# Patient Record
Sex: FEMALE | Race: WHITE | Employment: OTHER | URBAN - METROPOLITAN AREA
[De-identification: names, ages, dates, MRNs, and addresses within clinical notes are randomized per-mention and may not be internally consistent; named-entity substitution may affect disease eponyms.]

---

## 2017-02-07 ENCOUNTER — MYC REFILL (OUTPATIENT)
Dept: FAMILY MEDICINE | Facility: OTHER | Age: 72
End: 2017-02-07

## 2017-02-07 DIAGNOSIS — E87.6 HYPOKALEMIA: ICD-10-CM

## 2017-02-07 RX ORDER — POTASSIUM CHLORIDE 1500 MG/1
20 TABLET, EXTENDED RELEASE ORAL 2 TIMES DAILY
Qty: 180 TABLET | Refills: 1 | Status: CANCELLED | OUTPATIENT
Start: 2017-02-07

## 2017-02-07 NOTE — TELEPHONE ENCOUNTER
Potassium chloide      Last Written Prescription Date: 03/18/15  Last Fill Quantity: 180, # refills: 1  Last Office Visit with G, P or [facility] prescribing provider: 12/18/15       POTASSIUM   Date Value Ref Range Status   08/09/2016 4.0 3.4 - 5.3 mmol/L Final     CREATININE   Date Value Ref Range Status   08/09/2016 0.82 0.52 - 1.04 mg/dL Final     BP Readings from Last 3 Encounters:   08/09/16 121/75   01/21/16 142/84   01/15/16 126/76

## 2017-02-07 NOTE — TELEPHONE ENCOUNTER
Message from [employer]:  Original authorizing provider: Monique Ambriz MD, MD    Romina Chavarria would like a refill of the following medications:  potassium chloride SA (POTASSIUM CHLORIDE) 20 MEQ tablet [Monique Ambriz MD, MD]    Preferred pharmacy: [facility] - NEW RX ONLY    Comment:

## 2017-02-08 ENCOUNTER — MYC MEDICAL ADVICE (OUTPATIENT)
Dept: GASTROENTEROLOGY | Facility: CLINIC | Age: 72
End: 2017-02-08

## 2017-02-08 DIAGNOSIS — E87.6 HYPOKALEMIA: Primary | ICD-10-CM

## 2017-02-09 RX ORDER — POTASSIUM CHLORIDE 1500 MG/1
20 TABLET, EXTENDED RELEASE ORAL 2 TIMES DAILY
Qty: 180 TABLET | Refills: 1 | Status: SHIPPED | OUTPATIENT
Start: 2017-02-09 | End: 2018-06-12

## 2017-02-09 NOTE — TELEPHONE ENCOUNTER
I have not seen since 2014.  This was sent for hepatology per the subject line.  Monique Ambriz MD

## 2017-04-27 ENCOUNTER — TELEPHONE (OUTPATIENT)
Dept: FAMILY MEDICINE | Facility: OTHER | Age: 72
End: 2017-04-27

## 2017-04-27 NOTE — TELEPHONE ENCOUNTER
"4/27/2017      Call not required completed all HM screening    Canh \"Shirin\" Santosh  Central Scheduler    "

## 2017-05-15 ENCOUNTER — TELEPHONE (OUTPATIENT)
Dept: GASTROENTEROLOGY | Facility: CLINIC | Age: 72
End: 2017-05-15

## 2017-05-15 NOTE — TELEPHONE ENCOUNTER
Left message for pt reminding them of upcoming appointment.  Instructed pt to bring updated medications list.  Instructed pt to arrive an hour and a half to two hours prior to appt time for labs.  Misbah Knutson, CMA

## 2017-05-23 ENCOUNTER — OFFICE VISIT (OUTPATIENT)
Dept: GASTROENTEROLOGY | Facility: CLINIC | Age: 72
End: 2017-05-23
Attending: INTERNAL MEDICINE
Payer: MEDICARE

## 2017-05-23 VITALS
BODY MASS INDEX: 37.16 KG/M2 | OXYGEN SATURATION: 97 % | SYSTOLIC BLOOD PRESSURE: 147 MMHG | TEMPERATURE: 97.8 F | HEIGHT: 58 IN | DIASTOLIC BLOOD PRESSURE: 85 MMHG | HEART RATE: 66 BPM | WEIGHT: 177 LBS

## 2017-05-23 DIAGNOSIS — K70.30 ALCOHOLIC CIRRHOSIS OF LIVER WITHOUT ASCITES (H): ICD-10-CM

## 2017-05-23 DIAGNOSIS — K70.30 ALCOHOLIC CIRRHOSIS OF LIVER WITHOUT ASCITES (H): Primary | ICD-10-CM

## 2017-05-23 LAB
AFP SERPL-MCNC: 2.1 UG/L (ref 0–8)
ALBUMIN SERPL-MCNC: 3.4 G/DL (ref 3.4–5)
ALP SERPL-CCNC: 68 U/L (ref 40–150)
ALT SERPL W P-5'-P-CCNC: 37 U/L (ref 0–50)
ANION GAP SERPL CALCULATED.3IONS-SCNC: 8 MMOL/L (ref 3–14)
AST SERPL W P-5'-P-CCNC: 31 U/L (ref 0–45)
BILIRUB DIRECT SERPL-MCNC: 0.2 MG/DL (ref 0–0.2)
BILIRUB SERPL-MCNC: 0.6 MG/DL (ref 0.2–1.3)
BUN SERPL-MCNC: 15 MG/DL (ref 7–30)
CALCIUM SERPL-MCNC: 9.3 MG/DL (ref 8.5–10.1)
CHLORIDE SERPL-SCNC: 106 MMOL/L (ref 94–109)
CO2 SERPL-SCNC: 26 MMOL/L (ref 20–32)
CREAT SERPL-MCNC: 0.77 MG/DL (ref 0.52–1.04)
ERYTHROCYTE [DISTWIDTH] IN BLOOD BY AUTOMATED COUNT: 13.6 % (ref 10–15)
GFR SERPL CREATININE-BSD FRML MDRD: 74 ML/MIN/1.7M2
GLUCOSE SERPL-MCNC: 133 MG/DL (ref 70–99)
HCT VFR BLD AUTO: 44.2 % (ref 35–47)
HGB BLD-MCNC: 15.2 G/DL (ref 11.7–15.7)
INR PPP: 1.17 (ref 0.86–1.14)
MCH RBC QN AUTO: 34.1 PG (ref 26.5–33)
MCHC RBC AUTO-ENTMCNC: 34.4 G/DL (ref 31.5–36.5)
MCV RBC AUTO: 99 FL (ref 78–100)
PLATELET # BLD AUTO: 175 10E9/L (ref 150–450)
POTASSIUM SERPL-SCNC: 3.9 MMOL/L (ref 3.4–5.3)
PROT SERPL-MCNC: 7.2 G/DL (ref 6.8–8.8)
RBC # BLD AUTO: 4.46 10E12/L (ref 3.8–5.2)
SODIUM SERPL-SCNC: 141 MMOL/L (ref 133–144)
WBC # BLD AUTO: 4.9 10E9/L (ref 4–11)

## 2017-05-23 PROCEDURE — 99212 OFFICE O/P EST SF 10 MIN: CPT | Mod: ZF

## 2017-05-23 PROCEDURE — 82105 ALPHA-FETOPROTEIN SERUM: CPT | Performed by: INTERNAL MEDICINE

## 2017-05-23 PROCEDURE — 80076 HEPATIC FUNCTION PANEL: CPT | Performed by: INTERNAL MEDICINE

## 2017-05-23 PROCEDURE — 36415 COLL VENOUS BLD VENIPUNCTURE: CPT | Performed by: INTERNAL MEDICINE

## 2017-05-23 PROCEDURE — 80048 BASIC METABOLIC PNL TOTAL CA: CPT | Performed by: INTERNAL MEDICINE

## 2017-05-23 PROCEDURE — 85027 COMPLETE CBC AUTOMATED: CPT | Performed by: INTERNAL MEDICINE

## 2017-05-23 PROCEDURE — 85610 PROTHROMBIN TIME: CPT | Performed by: INTERNAL MEDICINE

## 2017-05-23 ASSESSMENT — PAIN SCALES - GENERAL: PAINLEVEL: NO PAIN (0)

## 2017-05-23 NOTE — PROGRESS NOTES
HISTORY OF PRESENT ILLNESS:  I had the pleasure of seeing Romina Chavarria for followup in the Liver Clinic at the [facility] on 05/23/2017.  Ms. 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  She is a sustained virologic responder to hepatitis C treatment.        She is actually doing quite well.  She denies any abdominal pain, itching or skin rash.  She does complain of fatigue.  She denies any increased abdominal girth or lower extremity edema.  She denies any gastrointestinal bleeding or any overt signs of encephalopathy.  She denies any fevers or chills, cough or shortness of breath.  She denies any nausea, vomiting, diarrhea or constipation.  Her appetite has been good and her weight, unfortunately, has remained stable.  It would be nice to see her lose some weight. There have been no other new events since she was last seen.       Current Outpatient Prescriptions   Medication     potassium chloride SA (POTASSIUM CHLORIDE) 20 MEQ CR tablet     furosemide (LASIX) 40 MG tablet     spironolactone (ALDACTONE) 50 MG tablet     ferrous gluconate (FERGON) 324 (38 FE) MG tablet     sertraline (ZOLOFT) 100 MG tablet     traZODone (DESYREL) 50 MG tablet     UNABLE TO FIND     triamcinolone (KENALOG) 0.1 % cream     Omega-3 Fatty Acids (FISH OIL TRIPLE STRENGTH) 1400 MG CAPS     Multiple Vitamin (MULTI-DAY) TABS     GREEN COFFEE BEAN PO     loratadine (CLARITIN) 10 MG tablet     No current facility-administered medications for this visit.      B/P: 147/85, T: 97.8, P: 66, R: Data Unavailable    HEENT exam shows no scleral icterus and no temporal muscle wasting.  Chest is clear.  Abdominal exam shows no increase in girth.  No masses or tenderness to palpation are present.  Her liver is 10 cm in span without left lobe enlargement.  No spleen tip is palpable.  Extremity exam shows no edema.  Skin exam shows no stigmata of chronic liver disease.  Neurologic exam shows  no asterixis.       Recent Results (from the past 168 hour(s))    Hepatic Panel [LAB20]    Collection Time: 05/23/17 10:11 AM   Result Value Ref Range    Bilirubin Direct 0.2 0.0 - 0.2 mg/dL    Bilirubin Total 0.6 0.2 - 1.3 mg/dL    Albumin 3.4 3.4 - 5.0 g/dL    Protein Total 7.2 6.8 - 8.8 g/dL    Alkaline Phosphatase 68 40 - 150 U/L    ALT 37 0 - 50 U/L    AST 31 0 - 45 U/L   Basic metabolic panel [LAB15]    Collection Time: 05/23/17 10:11 AM   Result Value Ref Range    Sodium 141 133 - 144 mmol/L    Potassium 3.9 3.4 - 5.3 mmol/L    Chloride 106 94 - 109 mmol/L    Carbon Dioxide 26 20 - 32 mmol/L    Anion Gap 8 3 - 14 mmol/L    Glucose 133 (H) 70 - 99 mg/dL    Urea Nitrogen 15 7 - 30 mg/dL    Creatinine 0.77 0.52 - 1.04 mg/dL    GFR Estimate 74 >60 mL/min/1.7m2    GFR Estimate If Black 90 >60 mL/min/1.7m2    Calcium 9.3 8.5 - 10.1 mg/dL   CBC with platelets [PMN990]    Collection Time: 05/23/17 10:11 AM   Result Value Ref Range    WBC 4.9 4.0 - 11.0 10e9/L    RBC Count 4.46 3.8 - 5.2 10e12/L    Hemoglobin 15.2 11.7 - 15.7 g/dL    Hematocrit 44.2 35.0 - 47.0 %    MCV 99 78 - 100 fl    MCH 34.1 (H) 26.5 - 33.0 pg    MCHC 34.4 31.5 - 36.5 g/dL    RDW 13.6 10.0 - 15.0 %    Platelet Count 175 150 - 450 10e9/L   INR [GRB7457]    Collection Time: 05/23/17 10:11 AM   Result Value Ref Range    INR 1.17 (H) 0.86 - 1.14      She is due for an ultrasound.  Her last ultrasound was in December and showed no ascites and no mass lesions.      My impression is that Ms. 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  Her disease is extremely well compensated at this point in time.  I will not be making any change to her medical regimen as all complications are well addressed.  I will get an ultrasound now and she will return in 6 months for repeat blood work and ultrasound.  She will be due for a DEXA in 6 months as well as upper GI endoscopy to screen for esophageal varices.  She is otherwise up-to-date with other  cancer screening and vaccines.      Thank you very much for allowing me to participate in the care of this patient.  If you have any questions regarding my recommendations, please do not hesitate to contact me.       José Antonio Reese MD      Professor of Medicine  [employer]      Executive Medical Director, Solid Organ Transplant Program  [facility]

## 2017-05-23 NOTE — NURSING NOTE
Chief Complaint   Patient presents with     RECHECK     Alcoholic Cirrhosis without ascites   Pt roomed, vitals, meds, and allergies reviewed with pt. Pt ready for provider.  Misbah Knutson, CMA

## 2017-05-23 NOTE — MR AVS SNAPSHOT
After Visit Summary   5/23/2017    Romina Chavarria    MRN: [id number]           Patient Information     Date Of Birth          1945        Visit Information        Provider Department      5/23/2017 11:00 AM José Antonio Reese MD [facility] Hepatology        Today's Diagnoses     Alcoholic cirrhosis of liver without ascites (H)    -  1       Follow-ups after your visit        Follow-up notes from your care team     Return in about 6 months (around 11/23/2017).      Your next 10 appointments already scheduled     Nov 07, 2017 11:15 AM CST   (Arrive by 11:00 AM)   Return General Liver with José Antonio Reese MD   [facility]at[facility] ([facility] [place])    [address] [facility] [address]  [address] [facility] [address] [phone]              Future tests that were ordered for you today     Open Standing Orders        Priority Remaining Interval Expires Ordered    US abdomen complete [OEK333] Routine 2/2 Every 6 Months 6/22/2018 5/23/2017          Open Future Orders        Priority Expected Expires Ordered     Hepatic Panel [LAB20] Routine 11/19/2017 6/22/2018 5/23/2017    Basic metabolic panel [LAB15] Routine 11/19/2017 6/22/2018 5/23/2017    CBC with platelets [[id number]] Routine 11/19/2017 6/22/2018 5/23/2017    INR [[id number]] Routine 11/19/2017 6/22/2018 5/23/2017    AFP tumor marker [ERC830] Routine 11/19/2017 6/22/2018 5/23/2017            Who to contact     If you have questions or need follow up information about today's clinic visit or your schedule please contact [facility]PAT[facility] directly at [phone].  Normal or non-critical lab and imaging results will be communicated to you by MyChart, letter or phone within 4 business days after the clinic has received the results. If you do not hear from us within 7 days, please contact the clinic through MyChart or phone. If you have a critical or abnormal lab result, we will notify you by phone as soon as possible.  Submit refill requests through  "MyChart or call your pharmacy and they will forward the refill request to us. Please allow 3 business days for your refill to be completed.          Additional Information About Your Visit        MyChart Information     [employer] gives you secure access to your electronic health record. If you see a primary care provider, you can also send messages to your care team and make appointments. If you have questions, please call your primary care clinic.  If you do not have a primary care provider, please call [phone] and they will assist you.        Care EveryWhere ID     This is your Care EveryWhere ID. This could be used by other organizations to access your [facility] medical records  [id number]        Your Vitals Were     Pulse Temperature Height Pulse Oximetry BMI (Body Mass Index)       66 97.8  F (36.6  C) (Oral) 1.473 m (4' 10\") 97% 36.99 kg/m2        Blood Pressure from Last 3 Encounters:   05/23/17 147/85   08/09/16 121/75   01/21/16 142/84    Weight from Last 3 Encounters:   05/23/17 80.3 kg (177 lb)   08/09/16 84.8 kg (187 lb)   01/21/16 80.6 kg (177 lb 12.8 oz)              We Performed the Following     Schedule follow up appointments     US Abd Cmpl Abd/Pelvis Duplex Cmpl        Primary Care Provider Office Phone # Fax #    Monique Kriss Ambriz [phone]       [facility]  [address] [address]        Thank you!     Thank you for choosing [facility]  for your care. Our goal is always to provide you with excellent care. Hearing back from our patients is one way we can continue to improve our services. Please take a few minutes to complete the written survey that you may receive in the mail after your visit with us. Thank you!             Your Updated Medication List - Protect others around you: Learn how to safely use, store and throw away your medicines at www.disposemymeds.org.          This list is accurate as of: 5/23/17 11:53 AM.  Always use your " most recent med list.                   Brand Name Dispense Instructions for use    ferrous gluconate 324 (38 FE) MG tablet    FERGON    100 tablet    Take 1 tablet (324 mg) by mouth 3 times daily (with meals)       FISH OIL TRIPLE STRENGTH 1400 MG Caps      Take 1 capsule by mouth once daily. Indications: CARDIAC DISEASE       furosemide 40 MG tablet    LASIX    180 tablet    TAKE 1 TABLET TWICE DAILY       GREEN COFFEE BEAN PO      Take  by mouth.       loratadine 10 MG tablet    CLARITIN     Take 1 tablet by mouth daily.       MULTI-DAY Tabs      Take 1 tablet by mouth once daily. Indications: VITAMIN DEFICIENCY PREVENTION       potassium chloride SA 20 MEQ CR tablet    potassium chloride    180 tablet    Take 1 tablet (20 mEq) by mouth 2 times daily       sertraline 100 MG tablet    ZOLOFT    90 tablet    Take 1 tablet (100 mg) by mouth daily       spironolactone 50 MG tablet    ALDACTONE    180 tablet    TAKE 1 TABLET TWICE DAILY       traZODone 50 MG tablet    DESYREL    90 tablet    Take 1 tablet (50 mg) by mouth At Bedtime       triamcinolone 0.1 % cream    KENALOG    60 g    Apply topically 2 times daily       UNABLE TO FIND      MEDICATION NAME: Liver Clense - [employer] Milk Thistle, Artichoke, Tumeric

## 2017-05-26 ENCOUNTER — RADIANT APPOINTMENT (OUTPATIENT)
Dept: ULTRASOUND IMAGING | Facility: OTHER | Age: 72
End: 2017-05-26
Attending: INTERNAL MEDICINE
Payer: COMMERCIAL

## 2017-05-26 DIAGNOSIS — K70.30 ALCOHOLIC CIRRHOSIS OF LIVER WITHOUT ASCITES (H): ICD-10-CM

## 2017-05-26 PROCEDURE — 76700 US EXAM ABDOM COMPLETE: CPT | Mod: 59

## 2017-05-26 PROCEDURE — 93975 VASCULAR STUDY: CPT

## 2017-08-14 ENCOUNTER — MYC REFILL (OUTPATIENT)
Dept: GASTROENTEROLOGY | Facility: CLINIC | Age: 72
End: 2017-08-14

## 2017-08-14 DIAGNOSIS — F41.9 ANXIETY: ICD-10-CM

## 2017-08-14 RX ORDER — SERTRALINE HYDROCHLORIDE 100 MG/1
100 TABLET, FILM COATED ORAL DAILY
Qty: 90 TABLET | Refills: 3 | Status: SHIPPED | OUTPATIENT
Start: 2017-08-14 | End: 2018-12-07

## 2017-08-14 RX ORDER — TRAZODONE HYDROCHLORIDE 50 MG/1
50 TABLET, FILM COATED ORAL AT BEDTIME
Qty: 90 TABLET | Refills: 3 | Status: SHIPPED | OUTPATIENT
Start: 2017-08-14 | End: 2018-12-07

## 2017-08-14 NOTE — TELEPHONE ENCOUNTER
Message from MyChart:  Original authorizing provider: José Antonio Reese MD    Romina Chavarria would like a refill of the following medications:  sertraline (ZOLOFT) 100 MG tablet [José Antonio Reese MD]  traZODone (DESYREL) 50 MG tablet [José Antonio Reese MD]    Preferred pharmacy: [facility] - [facility] [address]    Comment:

## 2017-10-24 ENCOUNTER — HOSPITAL ENCOUNTER (OUTPATIENT)
Dept: MAMMOGRAPHY | Facility: CLINIC | Age: 72
End: 2017-10-24
Attending: INTERNAL MEDICINE
Payer: MEDICARE

## 2017-10-24 ENCOUNTER — HOSPITAL ENCOUNTER (OUTPATIENT)
Dept: ULTRASOUND IMAGING | Facility: CLINIC | Age: 72
Discharge: HOME | End: 2017-10-24
Attending: INTERNAL MEDICINE | Admitting: INTERNAL MEDICINE
Payer: MEDICARE

## 2017-10-24 ENCOUNTER — HOSPITAL ENCOUNTER (OUTPATIENT)
Dept: ULTRASOUND IMAGING | Facility: CLINIC | Age: 72
End: 2017-10-24
Attending: INTERNAL MEDICINE
Payer: MEDICARE

## 2017-10-24 DIAGNOSIS — K70.30 ALCOHOLIC CIRRHOSIS OF LIVER WITHOUT ASCITES (H): ICD-10-CM

## 2017-10-24 DIAGNOSIS — N60.01 BREAST CYST, RIGHT: ICD-10-CM

## 2017-10-24 LAB
AFP SERPL-MCNC: 3.8 UG/L (ref 0–8)
ALBUMIN SERPL-MCNC: 3.6 G/DL (ref 3.4–5)
ALP SERPL-CCNC: 66 U/L (ref 40–150)
ALT SERPL W P-5'-P-CCNC: 33 U/L (ref 0–50)
ANION GAP SERPL CALCULATED.3IONS-SCNC: 6 MMOL/L (ref 3–14)
AST SERPL W P-5'-P-CCNC: 32 U/L (ref 0–45)
BILIRUB DIRECT SERPL-MCNC: 0.3 MG/DL (ref 0–0.2)
BILIRUB SERPL-MCNC: 0.7 MG/DL (ref 0.2–1.3)
BUN SERPL-MCNC: 12 MG/DL (ref 7–30)
CALCIUM SERPL-MCNC: 9.1 MG/DL (ref 8.5–10.1)
CHLORIDE SERPL-SCNC: 109 MMOL/L (ref 94–109)
CO2 SERPL-SCNC: 28 MMOL/L (ref 20–32)
CREAT SERPL-MCNC: 0.75 MG/DL (ref 0.52–1.04)
ERYTHROCYTE [DISTWIDTH] IN BLOOD BY AUTOMATED COUNT: 13.4 % (ref 10–15)
GFR SERPL CREATININE-BSD FRML MDRD: 76 ML/MIN/1.7M2
GLUCOSE SERPL-MCNC: 133 MG/DL (ref 70–99)
HCT VFR BLD AUTO: 44.2 % (ref 35–47)
HGB BLD-MCNC: 14.4 G/DL (ref 11.7–15.7)
INR PPP: 1.01 (ref 0.86–1.14)
MCH RBC QN AUTO: 33 PG (ref 26.5–33)
MCHC RBC AUTO-ENTMCNC: 32.6 G/DL (ref 31.5–36.5)
MCV RBC AUTO: 101 FL (ref 78–100)
PLATELET # BLD AUTO: 151 10E9/L (ref 150–450)
POTASSIUM SERPL-SCNC: 4 MMOL/L (ref 3.4–5.3)
PROT SERPL-MCNC: 6.9 G/DL (ref 6.8–8.8)
RBC # BLD AUTO: 4.36 10E12/L (ref 3.8–5.2)
SODIUM SERPL-SCNC: 143 MMOL/L (ref 133–144)
WBC # BLD AUTO: 4 10E9/L (ref 4–11)

## 2017-10-24 PROCEDURE — G0279 TOMOSYNTHESIS, MAMMO: HCPCS

## 2017-10-24 PROCEDURE — 76642 ULTRASOUND BREAST LIMITED: CPT | Mod: RT

## 2017-10-24 PROCEDURE — 80076 HEPATIC FUNCTION PANEL: CPT | Performed by: INTERNAL MEDICINE

## 2017-10-24 PROCEDURE — 76700 US EXAM ABDOM COMPLETE: CPT

## 2017-10-24 PROCEDURE — 36415 COLL VENOUS BLD VENIPUNCTURE: CPT | Performed by: INTERNAL MEDICINE

## 2017-10-24 PROCEDURE — 85610 PROTHROMBIN TIME: CPT | Performed by: INTERNAL MEDICINE

## 2017-10-24 PROCEDURE — 80048 BASIC METABOLIC PNL TOTAL CA: CPT | Performed by: INTERNAL MEDICINE

## 2017-10-24 PROCEDURE — 85027 COMPLETE CBC AUTOMATED: CPT | Performed by: INTERNAL MEDICINE

## 2017-10-24 PROCEDURE — 82105 ALPHA-FETOPROTEIN SERUM: CPT | Performed by: INTERNAL MEDICINE

## 2017-10-25 ENCOUNTER — TELEPHONE (OUTPATIENT)
Dept: FAMILY MEDICINE | Facility: OTHER | Age: 72
End: 2017-10-25

## 2017-10-25 NOTE — TELEPHONE ENCOUNTER
Spoke with patient. Was outside on Monday and was working in the yard.  Pulled on a root and it broke off and she fell.  Yesterday was busy with a few appointments and seemed ok,  But now today with deep breathing is worse.  Seems to be getting worse. Hurt when turning over in bed last night. Better with just resting. Did schedule her for an appointment for tomorrow.  Will try OTC pain reducer tonight and rest.  If symptoms worsen needs to be seen sooner.    RECOMMENDED DISPOSITION:  See in 24 hours -   Will comply with recommendation: yes   If further questions/concerns or if Sx do not improve, worsen or new Sx develop, call your PCP or [facility] as soon as possible.    NOTES:  Disposition was determined by the first positive assessment question, therefore all previous assessment questions were negative.     Guideline used:fall  Telephone Triage Protocols for Nurses, Fifth Edition, Lizzy Thomas RN, BSN

## 2017-10-25 NOTE — PROGRESS NOTES
SUBJECTIVE:                                                    Romina Chavarria is a 71 year old female who presents to clinic today for the following health issues:      HPI    Joint Pain    Onset: x 4 days    Description:   Location: left shoulder and left breast region  Character: Sharp- when breathing    Intensity: moderate, 5/10    Progression of Symptoms: worse    Accompanying Signs & Symptoms:  Other symptoms: none    History:   Previous similar pain: no       Precipitating factors:   Trauma or overuse: YES- Fell on Monday    Alleviating factors:  Improved by: Ibuprofen    Therapies Tried and outcome: Ibuprofen    Patient was pulling roots in her yard and it broke and she fell onto her side. Did not hit head. Having pain along the left shoulder into left side of the breast. The pain hurts mostly when she takes a deep breath, usually not hurting when sitting still. No fevers or chills. No numbness or tingling. No bruising,  Skin intact. She had a fall in her shower a couple months ago onto the same shoulder.     Problem list and histories reviewed & adjusted, as indicated.  Additional history: as documented      Current Outpatient Prescriptions   Medication Sig Dispense Refill     sertraline (ZOLOFT) 100 MG tablet Take 1 tablet (100 mg) by mouth daily 90 tablet 3     traZODone (DESYREL) 50 MG tablet Take 1 tablet (50 mg) by mouth At Bedtime 90 tablet 3     potassium chloride SA (POTASSIUM CHLORIDE) 20 MEQ CR tablet Take 1 tablet (20 mEq) by mouth 2 times daily 180 tablet 1     furosemide (LASIX) 40 MG tablet TAKE 1 TABLET TWICE DAILY 180 tablet 3     spironolactone (ALDACTONE) 50 MG tablet TAKE 1 TABLET TWICE DAILY 180 tablet 3     ferrous gluconate (FERGON) 324 (38 FE) MG tablet Take 1 tablet (324 mg) by mouth 3 times daily (with meals) 100 tablet 11     UNABLE TO FIND MEDICATION NAME: Liver [place] - [employer]  Milk Thistle, Artichoke, Tumeric       triamcinolone (KENALOG)  "0.1 % cream Apply topically 2 times daily 60 g 3     Multiple Vitamin (MULTI-DAY) TABS Take 1 tablet by mouth once daily. Indications: VITAMIN DEFICIENCY PREVENTION       loratadine (CLARITIN) 10 MG tablet Take 1 tablet by mouth daily.       Omega-3 Fatty Acids (FISH OIL TRIPLE STRENGTH) 1400 MG CAPS Take 1 capsule by mouth once daily. Indications: CARDIAC DISEASE       GREEN COFFEE BEAN PO Take  by mouth.         ROS:  Review of Systems   Constitutional: Negative.    HENT: Negative.    Eyes: Negative.    Respiratory: Negative.    Cardiovascular: Positive for chest pain (breast pain). Negative for palpitations and leg swelling.   Gastrointestinal: Negative.    Endocrine: Negative.    Genitourinary: Negative.    Musculoskeletal: Positive for joint swelling.        Left shoulder discomfort.    Skin: Negative.    Allergic/Immunologic: Negative.    Neurological: Negative.    Hematological: Negative.    Psychiatric/Behavioral: Negative.          OBJECTIVE:     /78 (BP Location: Left arm, Patient Position: Chair, Cuff Size: Adult Regular)  Pulse 68  Temp 98.3  F (36.8  C) (Oral)  Resp 16  Ht 4' 10\" (1.473 m)  SpO2 98%  There is no height or weight on file to calculate BMI.   Physical Exam   Constitutional: She is oriented to person, place, and time. Vital signs are normal.   Musculoskeletal:        Left shoulder: She exhibits decreased range of motion and pain (induced from pain along ribcage area. ). She exhibits no tenderness, no bony tenderness, no swelling, no effusion, no deformity, no laceration, normal pulse and normal strength.        Arms:  Pain along the let underarm. No tenderness along the left shoulder joint. Decrease ROM due to underarm pain and left rib pain.    Neurological: She is alert and oriented to person, place, and time. She has normal strength. No cranial nerve deficit or sensory deficit.       Diagnostic Test Results:  Xray - Chest xray 2 view and Rib 2 view.   EKG - " negative    ASSESSMENT/PLAN:         1. Rib pain on left side  - Await results of xray, likely contusion or mild fracture of the rib area.   - Discussed pain medication and monitoring.   - Advised to avoid high amounts of ibuprofen, ok to take sparingly as this will help with inflammation.   - Discussed worsening symptoms and when to go in to be evaluated.     2. History of recent fall  - Discussed physical therapy and home assessment given history of falls, patient declined.   - XR Chest 2 Views; Future      3. At risk for falling  - Discussed above, patient declined for now.       4. Alcohol dependence in remission (H)  -Stable, continues to be in remission.     5. Need for prophylactic vaccination and inoculation against influenza    - FLU VACCINE, INCREASED ANTIGEN, PRESV FREE, AGE 65+ [27471]  - Vaccine Administration, Initial [70087]    Patient Instructions   - Await results of of xray, likely contusion or mild fracture of the rib area. This can take awhile to heal. Recommend ice 3-4 times daily, ibuprofen sparingly along with TYLENOL 1000 mg every 6 hours; maximum daily dose: 3000 mg daily.   -Recommend holding a pillow with deep breathing and coughing  - If you develop shortness of breath, increased pain or worsening symptoms please return to be evaluated.   - If you change your mind and would like to do some physical therapy and home safety assessment please let me know.     MELE Howard CNP        Rib Contusion or Minor Fracture    A rib contusion is a bruise to one or more rib bones. It may cause pain, tenderness, swelling, and a purplish tint to the skin. There may be a sharp pain with each breath. A rib contusion takes anywhere from a few days to a few weeks to heal. A minor rib fracture or break may cause the same symptoms as a rib contusion. The small crack may not be seen on a regular chest X-ray. Treatment for both problems is the same.  Home care    You may use over-the-counter pain  medicine to control pain, unless another pain medicine was prescribed. If you have chronic liver or kidney disease or ever had a stomach ulcer or GI bleeding, talk with your healthcare provider before using these medicines.    Rest. Do not lift anything heavy or do any activity that causes pain.    Apply an ice pack over the injured area for 15 to 20 minutes every 1 to 2 hours. You should do this for the first 24 to 48 hours. You can make an ice pack by filling a plastic bag that seals at the top with ice cubes and then wrapping it with a thin towel. Continue with ice packs as needed for the relief of pain and swelling.    The first 3 to 4 weeks of healing will be the most painful. If your pain is not under control with the treatment given, call your healthcare provider. Sometimes a stronger pain medicine may be needed. A nerve block can be done in case of severe pain. It will numb the nerve between the ribs.  Follow-up care  Follow up with your healthcare provider, or as advised.  If X-rays were taken, you will be told of any new findings that may affect your care.  Call 911  Call 911 if you have:    Dizziness, weakness or fainting    Shortness of breath with or without chest discomfort    New or worsening pain  When to seek medical advice  Call your healthcare provider right away if any of these occur:    Fever of 100.4 F (38 C) or above lasting for 24 to 48 hours    Stomach pain  Date Last Reviewed: 12/2/2015 2000-2017 [employer]. [address] [place] [address], [place] [address]. All rights reserved. This information is not intended as a substitute for professional medical care. Always follow your healthcare professional's instructions.            MELE Howard [facility]

## 2017-10-26 ENCOUNTER — OFFICE VISIT (OUTPATIENT)
Dept: FAMILY MEDICINE | Facility: OTHER | Age: 72
End: 2017-10-26
Payer: COMMERCIAL

## 2017-10-26 ENCOUNTER — RADIANT APPOINTMENT (OUTPATIENT)
Dept: GENERAL RADIOLOGY | Facility: OTHER | Age: 72
End: 2017-10-26
Attending: NURSE PRACTITIONER
Payer: COMMERCIAL

## 2017-10-26 VITALS
HEART RATE: 68 BPM | RESPIRATION RATE: 16 BRPM | SYSTOLIC BLOOD PRESSURE: 126 MMHG | OXYGEN SATURATION: 98 % | HEIGHT: 58 IN | DIASTOLIC BLOOD PRESSURE: 78 MMHG | TEMPERATURE: 98.3 F

## 2017-10-26 DIAGNOSIS — Z91.81 AT RISK FOR FALLING: ICD-10-CM

## 2017-10-26 DIAGNOSIS — F10.21 ALCOHOL DEPENDENCE IN REMISSION (H): ICD-10-CM

## 2017-10-26 DIAGNOSIS — Z91.81 HISTORY OF RECENT FALL: ICD-10-CM

## 2017-10-26 DIAGNOSIS — R07.81 RIB PAIN ON LEFT SIDE: ICD-10-CM

## 2017-10-26 DIAGNOSIS — Z23 NEED FOR PROPHYLACTIC VACCINATION AND INOCULATION AGAINST INFLUENZA: ICD-10-CM

## 2017-10-26 DIAGNOSIS — R07.81 RIB PAIN ON LEFT SIDE: Primary | ICD-10-CM

## 2017-10-26 PROCEDURE — 99213 OFFICE O/P EST LOW 20 MIN: CPT | Mod: 25 | Performed by: NURSE PRACTITIONER

## 2017-10-26 PROCEDURE — 71020 XR CHEST 2 VW: CPT

## 2017-10-26 PROCEDURE — G0008 ADMIN INFLUENZA VIRUS VAC: HCPCS | Performed by: NURSE PRACTITIONER

## 2017-10-26 PROCEDURE — 93000 ELECTROCARDIOGRAM COMPLETE: CPT | Performed by: NURSE PRACTITIONER

## 2017-10-26 PROCEDURE — 71100 X-RAY EXAM RIBS UNI 2 VIEWS: CPT | Mod: LT

## 2017-10-26 PROCEDURE — 90662 IIV NO PRSV INCREASED AG IM: CPT | Performed by: NURSE PRACTITIONER

## 2017-10-26 ASSESSMENT — ENCOUNTER SYMPTOMS
ALLERGIC/IMMUNOLOGIC NEGATIVE: 1
JOINT SWELLING: 1
PSYCHIATRIC NEGATIVE: 1
ENDOCRINE NEGATIVE: 1
EYES NEGATIVE: 1
PALPITATIONS: 0
NEUROLOGICAL NEGATIVE: 1
HEMATOLOGIC/LYMPHATIC NEGATIVE: 1
CONSTITUTIONAL NEGATIVE: 1
RESPIRATORY NEGATIVE: 1
GASTROINTESTINAL NEGATIVE: 1

## 2017-10-26 ASSESSMENT — PAIN SCALES - GENERAL: PAINLEVEL: MODERATE PAIN (5)

## 2017-10-26 NOTE — NURSING NOTE
"Chief Complaint   Patient presents with     Breathing Problem     Fall     Panel Management     prevnar, flu shto, fall risk, advanced directives, lipids       Initial /78 (BP Location: Left arm, Patient Position: Chair, Cuff Size: Adult Regular)  Pulse 68  Temp 98.3  F (36.8  C) (Oral)  Resp 16  Ht 4' 10\" (1.473 m)  SpO2 98% Estimated body mass index is 36.99 kg/(m^2) as calculated from the following:    Height as of 5/23/17: 4' 10\" (1.473 m).    Weight as of 5/23/17: 177 lb (80.3 kg).  Medication Reconciliation: complete   Nicole Mills CMA (AAMA)      "

## 2017-10-26 NOTE — PATIENT INSTRUCTIONS
- Await results of of xray, likely contusion or mild fracture of the rib area. This can take awhile to heal. Recommend ice 3-4 times daily, ibuprofen sparingly along with TYLENOL 1000 mg every 6 hours; maximum daily dose: 3000 mg daily.   -Recommend holding a pillow with deep breathing and coughing  - If you develop shortness of breath, increased pain or worsening symptoms please return to be evaluated.   - If you change your mind and would like to do some physical therapy and home safety assessment please let me know.     MELE Howard CNP        Rib Contusion or Minor Fracture    A rib contusion is a bruise to one or more rib bones. It may cause pain, tenderness, swelling, and a purplish tint to the skin. There may be a sharp pain with each breath. A rib contusion takes anywhere from a few days to a few weeks to heal. A minor rib fracture or break may cause the same symptoms as a rib contusion. The small crack may not be seen on a regular chest X-ray. Treatment for both problems is the same.  Home care    You may use over-the-counter pain medicine to control pain, unless another pain medicine was prescribed. If you have chronic liver or kidney disease or ever had a stomach ulcer or GI bleeding, talk with your healthcare provider before using these medicines.    Rest. Do not lift anything heavy or do any activity that causes pain.    Apply an ice pack over the injured area for 15 to 20 minutes every 1 to 2 hours. You should do this for the first 24 to 48 hours. You can make an ice pack by filling a plastic bag that seals at the top with ice cubes and then wrapping it with a thin towel. Continue with ice packs as needed for the relief of pain and swelling.    The first 3 to 4 weeks of healing will be the most painful. If your pain is not under control with the treatment given, call your healthcare provider. Sometimes a stronger pain medicine may be needed. A nerve block can be done in case of severe pain. It  will numb the nerve between the ribs.  Follow-up care  Follow up with your healthcare provider, or as advised.  If X-rays were taken, you will be told of any new findings that may affect your care.  Call 911  Call 911 if you have:    Dizziness, weakness or fainting    Shortness of breath with or without chest discomfort    New or worsening pain  When to seek medical advice  Call your healthcare provider right away if any of these occur:    Fever of 100.4 F (38 C) or above lasting for 24 to 48 hours    Stomach pain  Date Last Reviewed: 12/2/2015 2000-2017 [employer]. [address] [place] [address]. All rights reserved. This information is not intended as a substitute for professional medical care. Always follow your healthcare professional's instructions.

## 2017-10-26 NOTE — PROGRESS NOTES

## 2017-11-16 ENCOUNTER — TELEPHONE (OUTPATIENT)
Dept: GASTROENTEROLOGY | Facility: CLINIC | Age: 72
End: 2017-11-16

## 2018-01-22 ENCOUNTER — TELEPHONE (OUTPATIENT)
Dept: GASTROENTEROLOGY | Facility: CLINIC | Age: 73
End: 2018-01-22

## 2018-01-22 DIAGNOSIS — K70.30 ALCOHOLIC CIRRHOSIS OF LIVER WITHOUT ASCITES (H): Primary | ICD-10-CM

## 2018-01-22 DIAGNOSIS — K70.30 ALCOHOLIC CIRRHOSIS OF LIVER WITHOUT ASCITES (H): ICD-10-CM

## 2018-01-22 RX ORDER — FUROSEMIDE 40 MG
40 TABLET ORAL 2 TIMES DAILY
Qty: 180 TABLET | Refills: 3 | Status: SHIPPED | OUTPATIENT
Start: 2018-01-22 | End: 2019-02-20

## 2018-01-22 RX ORDER — SPIRONOLACTONE 50 MG/1
50 TABLET, FILM COATED ORAL 2 TIMES DAILY
Qty: 180 TABLET | Refills: 3 | Status: SHIPPED | OUTPATIENT
Start: 2018-01-22 | End: 2019-02-20

## 2018-01-22 NOTE — TELEPHONE ENCOUNTER
----- Message from Alicia Christina, RN sent at 1/22/2018  3:14 PM CST -----  Regarding: FW: Pt of Dr. Reese, pt's appt is 04/04/18 and would like the labs done at [facility]  Contact: [phone]      ----- Message -----     From: Gabby Lindsey     Sent: 1/22/2018   3:09 PM       To: Hepatology Nurses-  Subject: Pt of Dr. Reese, pt's appt is 04/04/18 and wo#    Pt of Dr. Reese, pt's appt is 04/04/18 and would like the labs done at [facility].  Can you put the orders in pt's chart please.    Thank you,    [facility]    Please DO NOT send this message and/or reply back to sender.  Call Center Representatives DO NOT respond to messages.

## 2018-01-22 NOTE — TELEPHONE ENCOUNTER
Lab orders entered and patient updated.  Patient requested refills of spironolactone and furosemide to [facility] mail order.

## 2018-02-09 DIAGNOSIS — K70.30 ALCOHOLIC CIRRHOSIS OF LIVER WITHOUT ASCITES (H): ICD-10-CM

## 2018-02-09 LAB
AFP SERPL-MCNC: 3.3 UG/L (ref 0–8)
ALBUMIN SERPL-MCNC: 3.6 G/DL (ref 3.4–5)
ALP SERPL-CCNC: 67 U/L (ref 40–150)
ALT SERPL W P-5'-P-CCNC: 24 U/L (ref 0–50)
ANION GAP SERPL CALCULATED.3IONS-SCNC: 7 MMOL/L (ref 3–14)
AST SERPL W P-5'-P-CCNC: 23 U/L (ref 0–45)
BILIRUB DIRECT SERPL-MCNC: 0.3 MG/DL (ref 0–0.2)
BILIRUB SERPL-MCNC: 0.8 MG/DL (ref 0.2–1.3)
BUN SERPL-MCNC: 11 MG/DL (ref 7–30)
CALCIUM SERPL-MCNC: 9.4 MG/DL (ref 8.5–10.1)
CHLORIDE SERPL-SCNC: 106 MMOL/L (ref 94–109)
CO2 SERPL-SCNC: 26 MMOL/L (ref 20–32)
CREAT SERPL-MCNC: 0.78 MG/DL (ref 0.52–1.04)
ERYTHROCYTE [DISTWIDTH] IN BLOOD BY AUTOMATED COUNT: 14.2 % (ref 10–15)
GFR SERPL CREATININE-BSD FRML MDRD: 73 ML/MIN/1.7M2
GLUCOSE SERPL-MCNC: 139 MG/DL (ref 70–99)
HCT VFR BLD AUTO: 43.3 % (ref 35–47)
HGB BLD-MCNC: 14.6 G/DL (ref 11.7–15.7)
INR PPP: 1.01 (ref 0.86–1.14)
MCH RBC QN AUTO: 34.4 PG (ref 26.5–33)
MCHC RBC AUTO-ENTMCNC: 33.7 G/DL (ref 31.5–36.5)
MCV RBC AUTO: 102 FL (ref 78–100)
PLATELET # BLD AUTO: 169 10E9/L (ref 150–450)
POTASSIUM SERPL-SCNC: 3.8 MMOL/L (ref 3.4–5.3)
PROT SERPL-MCNC: 7.4 G/DL (ref 6.8–8.8)
RBC # BLD AUTO: 4.25 10E12/L (ref 3.8–5.2)
SODIUM SERPL-SCNC: 139 MMOL/L (ref 133–144)
WBC # BLD AUTO: 4.2 10E9/L (ref 4–11)

## 2018-02-09 PROCEDURE — 80076 HEPATIC FUNCTION PANEL: CPT | Performed by: INTERNAL MEDICINE

## 2018-02-09 PROCEDURE — 80048 BASIC METABOLIC PNL TOTAL CA: CPT | Performed by: INTERNAL MEDICINE

## 2018-02-09 PROCEDURE — 82105 ALPHA-FETOPROTEIN SERUM: CPT | Performed by: INTERNAL MEDICINE

## 2018-02-09 PROCEDURE — 36415 COLL VENOUS BLD VENIPUNCTURE: CPT | Performed by: INTERNAL MEDICINE

## 2018-02-09 PROCEDURE — 85027 COMPLETE CBC AUTOMATED: CPT | Performed by: INTERNAL MEDICINE

## 2018-02-09 PROCEDURE — 85610 PROTHROMBIN TIME: CPT | Performed by: INTERNAL MEDICINE

## 2018-02-13 ENCOUNTER — OFFICE VISIT (OUTPATIENT)
Dept: GASTROENTEROLOGY | Facility: CLINIC | Age: 73
End: 2018-02-13
Attending: INTERNAL MEDICINE
Payer: MEDICARE

## 2018-02-13 VITALS
BODY MASS INDEX: 35.73 KG/M2 | WEIGHT: 182 LBS | TEMPERATURE: 98.3 F | HEART RATE: 77 BPM | DIASTOLIC BLOOD PRESSURE: 82 MMHG | HEIGHT: 60 IN | SYSTOLIC BLOOD PRESSURE: 124 MMHG

## 2018-02-13 DIAGNOSIS — K70.30 ALCOHOLIC CIRRHOSIS OF LIVER WITHOUT ASCITES (H): Primary | ICD-10-CM

## 2018-02-13 DIAGNOSIS — Z23 ENCOUNTER FOR IMMUNIZATION: ICD-10-CM

## 2018-02-13 PROCEDURE — G0009 ADMIN PNEUMOCOCCAL VACCINE: HCPCS | Mod: ZF

## 2018-02-13 PROCEDURE — 25000128 H RX IP 250 OP 636: Mod: ZF | Performed by: INTERNAL MEDICINE

## 2018-02-13 PROCEDURE — 90670 PCV13 VACCINE IM: CPT | Mod: ZF | Performed by: INTERNAL MEDICINE

## 2018-02-13 PROCEDURE — G0463 HOSPITAL OUTPT CLINIC VISIT: HCPCS | Mod: 25,ZF

## 2018-02-13 RX ADMIN — PNEUMOCOCCAL 13-VALENT CONJUGATE VACCINE 0.5 ML: 2.2; 2.2; 2.2; 2.2; 2.2; 4.4; 2.2; 2.2; 2.2; 2.2; 2.2; 2.2; 2.2 INJECTION, SUSPENSION INTRAMUSCULAR at 10:24

## 2018-02-13 ASSESSMENT — PAIN SCALES - GENERAL: PAINLEVEL: NO PAIN (0)

## 2018-02-13 NOTE — PROGRESS NOTES
I had the pleasure of seeing Romina Chavarria for followup in the Liver Clinic at the [facility] on 02/13/2018.  Ms. Chavarria returns for followup of alcoholic cirrhosis without ascites.        She is actually doing fairly well.  Her major issue has been weight gain, and she has found it very difficult to lose weight; however, she has abstained from alcohol.  She denies any fevers or chills, cough or shortness of breath.  She denies any nausea or vomiting, diarrhea or constipation.  Her appetite has been good, and her weight has been stable.  She has not been hospitalized since she was last seen.  She is up to date on her screening endoscopies.  She is due for a Prevnar 13 pneumococcal vaccination.     Current Outpatient Prescriptions   Medication     furosemide (LASIX) 40 MG tablet     spironolactone (ALDACTONE) 50 MG tablet     sertraline (ZOLOFT) 100 MG tablet     traZODone (DESYREL) 50 MG tablet     potassium chloride SA (POTASSIUM CHLORIDE) 20 MEQ CR tablet     ferrous gluconate (FERGON) 324 (38 FE) MG tablet     UNABLE TO FIND     triamcinolone (KENALOG) 0.1 % cream     Omega-3 Fatty Acids (FISH OIL TRIPLE STRENGTH) 1400 MG CAPS     GREEN COFFEE BEAN PO     loratadine (CLARITIN) 10 MG tablet     No current facility-administered medications for this visit.      B/P: 124/82, T: 98.3, P: 77, R: Data Unavailable    HEENT exam shows no scleral icterus and no temporal muscle wasting.  Her chest is clear.  Her abdominal exam shows her abdomen to be obese.  Her liver is 10 cm in span without left lobe enlargement.  No spleen tip is palpable, and extremity exam shows no edema.  Skin exam shows no stigmata of chronic liver disease.  Neurologic exam shows no asterixis.     Recent Results (from the past 168 hour(s))   Hepatic panel    Collection Time: 02/09/18  9:30 AM   Result Value Ref Range    Bilirubin Direct 0.3 (H) 0.0 - 0.2 mg/dL    Bilirubin Total 0.8 0.2 - 1.3 mg/dL    Albumin 3.6 3.4 - 5.0 g/dL     Protein Total 7.4 6.8 - 8.8 g/dL    Alkaline Phosphatase 67 40 - 150 U/L    ALT 24 0 - 50 U/L    AST 23 0 - 45 U/L   CBC with platelets    Collection Time: 02/09/18  9:30 AM   Result Value Ref Range    WBC 4.2 4.0 - 11.0 10e9/L    RBC Count 4.25 3.8 - 5.2 10e12/L    Hemoglobin 14.6 11.7 - 15.7 g/dL    Hematocrit 43.3 35.0 - 47.0 %     (H) 78 - 100 fl    MCH 34.4 (H) 26.5 - 33.0 pg    MCHC 33.7 31.5 - 36.5 g/dL    RDW 14.2 10.0 - 15.0 %    Platelet Count 169 150 - 450 10e9/L   INR    Collection Time: 02/09/18  9:30 AM   Result Value Ref Range    INR 1.01 0.86 - 1.14   Basic metabolic panel    Collection Time: 02/09/18  9:30 AM   Result Value Ref Range    Sodium 139 133 - 144 mmol/L    Potassium 3.8 3.4 - 5.3 mmol/L    Chloride 106 94 - 109 mmol/L    Carbon Dioxide 26 20 - 32 mmol/L    Anion Gap 7 3 - 14 mmol/L    Glucose 139 (H) 70 - 99 mg/dL    Urea Nitrogen 11 7 - 30 mg/dL    Creatinine 0.78 0.52 - 1.04 mg/dL    GFR Estimate 73 >60 mL/min/1.7m2    GFR Estimate If Black 88 >60 mL/min/1.7m2    Calcium 9.4 8.5 - 10.1 mg/dL   AFP tumor marker    Collection Time: 02/09/18  9:30 AM   Result Value Ref Range    Alpha Fetoprotein 3.3 0 - 8 ug/L      I did review her ultrasound from October that shows no ascites and no new lesions in the liver.      My impression is that Ms. Chavarria has alcoholic cirrhosis.  Her disease is well compensated at this point in time, and in the fact her liver tests are as good as I have seen them in almost any time in the past.  I did encourage her to lose weight, as she does have some fatty liver disease on her ultrasound.  Otherwise, my plan will be to see her back in the clinic in 6 months with repeat the ultrasound and blood work.  As I mentioned, she will get a Prevnar 13 vaccine today and will be up-to-date with her vaccinations.  She is up-to-date with regard to variceal and other cancer screening as well.      Thank you very much for allowing me to participate in the care of this  patient.  If you have any questions regarding my recommendations, please do not hesitate to contact me.       José Antonio Reese MD      Professor of Medicine  [employer]      Executive Medical Director, Solid Organ Transplant Program  [facility]

## 2018-02-13 NOTE — LETTER
2/13/2018      RE: Romina Chavarria  [address] [place] [address]       I had the pleasure of seeing Romina Chavarria for followup in the Liver Clinic at the [facility] on 02/13/2018.  Ms. Chavarria returns for followup of alcoholic cirrhosis without ascites.        She is actually doing fairly well.  Her major issue has been weight gain, and she has found it very difficult to lose weight; however, she has abstained from alcohol.  She denies any fevers or chills, cough or shortness of breath.  She denies any nausea or vomiting, diarrhea or constipation.  Her appetite has been good, and her weight has been stable.  She has not been hospitalized since she was last seen.  She is up to date on her screening endoscopies.  She is due for a Prevnar 13 pneumococcal vaccination.     Current Outpatient Prescriptions   Medication     furosemide (LASIX) 40 MG tablet     spironolactone (ALDACTONE) 50 MG tablet     sertraline (ZOLOFT) 100 MG tablet     traZODone (DESYREL) 50 MG tablet     potassium chloride SA (POTASSIUM CHLORIDE) 20 MEQ CR tablet     ferrous gluconate (FERGON) 324 (38 FE) MG tablet     UNABLE TO FIND     triamcinolone (KENALOG) 0.1 % cream     Omega-3 Fatty Acids (FISH OIL TRIPLE STRENGTH) 1400 MG CAPS     GREEN COFFEE BEAN PO     loratadine (CLARITIN) 10 MG tablet     No current facility-administered medications for this visit.      B/P: 124/82, T: 98.3, P: 77, R: Data Unavailable    HEENT exam shows no scleral icterus and no temporal muscle wasting.  Her chest is clear.  Her abdominal exam shows her abdomen to be obese.  Her liver is 10 cm in span without left lobe enlargement.  No spleen tip is palpable, and extremity exam shows no edema.  Skin exam shows no stigmata of chronic liver disease.  Neurologic exam shows no asterixis.     Recent Results (from the past 168 hour(s))   Hepatic panel    Collection Time: 02/09/18  9:30 AM   Result Value Ref Range    Bilirubin Direct 0.3 (H) 0.0  - 0.2 mg/dL    Bilirubin Total 0.8 0.2 - 1.3 mg/dL    Albumin 3.6 3.4 - 5.0 g/dL    Protein Total 7.4 6.8 - 8.8 g/dL    Alkaline Phosphatase 67 40 - 150 U/L    ALT 24 0 - 50 U/L    AST 23 0 - 45 U/L   CBC with platelets    Collection Time: 02/09/18  9:30 AM   Result Value Ref Range    WBC 4.2 4.0 - 11.0 10e9/L    RBC Count 4.25 3.8 - 5.2 10e12/L    Hemoglobin 14.6 11.7 - 15.7 g/dL    Hematocrit 43.3 35.0 - 47.0 %     (H) 78 - 100 fl    MCH 34.4 (H) 26.5 - 33.0 pg    MCHC 33.7 31.5 - 36.5 g/dL    RDW 14.2 10.0 - 15.0 %    Platelet Count 169 150 - 450 10e9/L   INR    Collection Time: 02/09/18  9:30 AM   Result Value Ref Range    INR 1.01 0.86 - 1.14   Basic metabolic panel    Collection Time: 02/09/18  9:30 AM   Result Value Ref Range    Sodium 139 133 - 144 mmol/L    Potassium 3.8 3.4 - 5.3 mmol/L    Chloride 106 94 - 109 mmol/L    Carbon Dioxide 26 20 - 32 mmol/L    Anion Gap 7 3 - 14 mmol/L    Glucose 139 (H) 70 - 99 mg/dL    Urea Nitrogen 11 7 - 30 mg/dL    Creatinine 0.78 0.52 - 1.04 mg/dL    GFR Estimate 73 >60 mL/min/1.7m2    GFR Estimate If Black 88 >60 mL/min/1.7m2    Calcium 9.4 8.5 - 10.1 mg/dL   AFP tumor marker    Collection Time: 02/09/18  9:30 AM   Result Value Ref Range    Alpha Fetoprotein 3.3 0 - 8 ug/L      I did review her ultrasound from October that shows no ascites and no new lesions in the liver.      My impression is that Ms. Chavarria has alcoholic cirrhosis.  Her disease is well compensated at this point in time, and in the fact her liver tests are as good as I have seen them in almost any time in the past.  I did encourage her to lose weight, as she does have some fatty liver disease on her ultrasound.  Otherwise, my plan will be to see her back in the clinic in 6 months with repeat the ultrasound and blood work.  As I mentioned, she will get a Prevnar 13 vaccine today and will be up-to-date with her vaccinations.  She is up-to-date with regard to variceal and other cancer screening as  well.      Thank you very much for allowing me to participate in the care of this patient.  If you have any questions regarding my recommendations, please do not hesitate to contact me.       José Antonio Reese MD      Professor of Medicine  [employer] [employer]      Executive Medical Director, Solid Organ Transplant Program  [facility] [place]

## 2018-02-13 NOTE — NURSING NOTE
Chief Complaint   Patient presents with     RECHECK     follow up with hepatitis, tzimmer cma       Initial /82  Pulse 77  Temp 98.3  F (36.8  C) (Oral)  Ht 1.524 m (5')  Wt 82.6 kg (182 lb)  BMI 35.54 kg/m2 Estimated body mass index is 35.54 kg/(m^2) as calculated from the following:    Height as of this encounter: 1.524 m (5').    Weight as of this encounter: 82.6 kg (182 lb).  Medication Reconciliation: complete

## 2018-02-13 NOTE — MR AVS SNAPSHOT
After Visit Summary   2/13/2018    Romina Chavarria    MRN: [id number]           Patient Information     Date Of Birth          1945        Visit Information        Provider Department      2/13/2018 9:45 AM José Antonio Reese MD [facility] Hepatology        Today's Diagnoses     Alcoholic cirrhosis of liver without ascites (H)    -  1    Encounter for immunization            Follow-ups after your visit        Follow-up notes from your care team     Return in about 6 months (around 8/13/2018).      Who to contact     If you have questions or need follow up information about today's clinic visit or your schedule please contact [facility] HEPATOLOGY directly at [phone].  Normal or non-critical lab and imaging results will be communicated to you by MyChart, letter or phone within 4 business days after the clinic has received the results. If you do not hear from us within 7 days, please contact the clinic through [employer] or phone. If you have a critical or abnormal lab result, we will notify you by phone as soon as possible.  Submit refill requests through [employer] or call your pharmacy and they will forward the refill request to us. Please allow 3 business days for your refill to be completed.          Additional Information About Your Visit        MyChart Information     [employer] gives you secure access to your electronic health record. If you see a primary care provider, you can also send messages to your care team and make appointments. If you have questions, please call your primary care clinic.  If you do not have a primary care provider, please call [phone] and they will assist you.        Care EveryWhere ID     This is your Care EveryWhere ID. This could be used by other organizations to access your [facility] medical records  [id number]        Your Vitals Were     Pulse Temperature Height BMI (Body Mass Index)          77 98.3  F (36.8  C) (Oral) 1.524 m (5') 35.54 kg/m2         Blood Pressure  from Last 3 Encounters:   02/13/18 124/82   10/26/17 126/78   05/23/17 147/85    Weight from Last 3 Encounters:   02/13/18 82.6 kg (182 lb)   05/23/17 80.3 kg (177 lb)   08/09/16 84.8 kg (187 lb)              We Performed the Following     Schedule follow up appointments          Today's Medication Changes          These changes are accurate as of 2/13/18 11:59 PM.  If you have any questions, ask your nurse or doctor.               These medicines have changed or have updated prescriptions.        Dose/Directions    furosemide 40 MG tablet   Commonly known as:  LASIX   This may have changed:  when to take this   Used for:  Alcoholic cirrhosis of liver without ascites (H)        Dose:  40 mg   Take 1 tablet (40 mg) by mouth 2 times daily   Quantity:  180 tablet   Refills:  3         Stop taking these medicines if you haven't already. Please contact your care team if you have questions.     MULTI-DAY Tabs   Stopped by:  José Antonio Reese MD                    Primary Care Provider Office Phone # Fax #    José Antonio Reese [phone]       [place] [facility] [address]  [facility] [address]        Equal Access to Services     [facility]: Hadii veronica vila hadasho Soelizabeth, waaxda luqadaha, qaybta kaalmada adeegyada, jeremy pederson . So [facility] [phone].    ATENCIÓN: Si habla español, tiene a quintanilla disposición servicios gratuitos de asistencia lingüística. Llame al [phone].    We comply with applicable federal civil rights laws and [place] laws. We do not discriminate on the basis of race, color, national origin, age, disability, sex, sexual orientation, or gender identity.            Thank you!     Thank you for choosing [facility] HEPATOLOGY  for your care. Our goal is always to provide you with excellent care. Hearing back from our patients is one way we can continue to improve our services. Please take a few minutes to complete the written survey that you may receive  in the mail after your visit with us. Thank you!             Your Updated Medication List - Protect others around you: Learn how to safely use, store and throw away your medicines at www.disposemymeds.org.          This list is accurate as of 2/13/18 11:59 PM.  Always use your most recent med list.                   Brand Name Dispense Instructions for use Diagnosis    ferrous gluconate 324 (38 FE) MG tablet    FERGON    100 tablet    Take 1 tablet (324 mg) by mouth 3 times daily (with meals)    Gastrointestinal hemorrhage, unspecified gastrointestinal hemorrhage type       FISH OIL TRIPLE STRENGTH 1400 MG Caps      Take 1 capsule by mouth once daily. Indications: CARDIAC DISEASE        furosemide 40 MG tablet    LASIX    180 tablet    Take 1 tablet (40 mg) by mouth 2 times daily    Alcoholic cirrhosis of liver without ascites (H)       GREEN COFFEE BEAN PO      Take  by mouth.        loratadine 10 MG tablet    CLARITIN     Take 1 tablet by mouth daily.        potassium chloride SA 20 MEQ CR tablet    KLOR-CON    180 tablet    Take 1 tablet (20 mEq) by mouth 2 times daily    Hypokalemia       sertraline 100 MG tablet    ZOLOFT    90 tablet    Take 1 tablet (100 mg) by mouth daily    Anxiety       spironolactone 50 MG tablet    ALDACTONE    180 tablet    Take 1 tablet (50 mg) by mouth 2 times daily    Alcoholic cirrhosis of liver without ascites (H)       traZODone 50 MG tablet    DESYREL    90 tablet    Take 1 tablet (50 mg) by mouth At Bedtime    Anxiety       triamcinolone 0.1 % cream    KENALOG    60 g    Apply topically 2 times daily    Venous stasis dermatitis, unspecified laterality       UNABLE TO FIND      MEDICATION NAME: Liver Dane - [employer] by [employer] Milk Thistle, Artichoke, Tumeric

## 2018-05-08 ASSESSMENT — ANXIETY QUESTIONNAIRES
GAD7 TOTAL SCORE: 7
7. FEELING AFRAID AS IF SOMETHING AWFUL MIGHT HAPPEN: SEVERAL DAYS
3. WORRYING TOO MUCH ABOUT DIFFERENT THINGS: SEVERAL DAYS
5. BEING SO RESTLESS THAT IT IS HARD TO SIT STILL: SEVERAL DAYS
2. NOT BEING ABLE TO STOP OR CONTROL WORRYING: SEVERAL DAYS
7. FEELING AFRAID AS IF SOMETHING AWFUL MIGHT HAPPEN: SEVERAL DAYS
GAD7 TOTAL SCORE: 7
6. BECOMING EASILY ANNOYED OR IRRITABLE: SEVERAL DAYS
GAD7 TOTAL SCORE: 7
1. FEELING NERVOUS, ANXIOUS, OR ON EDGE: SEVERAL DAYS
4. TROUBLE RELAXING: SEVERAL DAYS

## 2018-05-08 ASSESSMENT — PATIENT HEALTH QUESTIONNAIRE - PHQ9
SUM OF ALL RESPONSES TO PHQ QUESTIONS 1-9: 7
SUM OF ALL RESPONSES TO PHQ QUESTIONS 1-9: 7
10. IF YOU CHECKED OFF ANY PROBLEMS, HOW DIFFICULT HAVE THESE PROBLEMS MADE IT FOR YOU TO DO YOUR WORK, TAKE CARE OF THINGS AT HOME, OR GET ALONG WITH OTHER PEOPLE: SOMEWHAT DIFFICULT

## 2018-05-09 ENCOUNTER — OFFICE VISIT (OUTPATIENT)
Dept: FAMILY MEDICINE | Facility: OTHER | Age: 73
End: 2018-05-09
Payer: COMMERCIAL

## 2018-05-09 VITALS
BODY MASS INDEX: 35.86 KG/M2 | SYSTOLIC BLOOD PRESSURE: 128 MMHG | WEIGHT: 183.6 LBS | DIASTOLIC BLOOD PRESSURE: 78 MMHG | HEART RATE: 80 BPM | TEMPERATURE: 98.7 F

## 2018-05-09 DIAGNOSIS — L30.9 DERMATITIS: Primary | ICD-10-CM

## 2018-05-09 DIAGNOSIS — D22.9 ATYPICAL MOLE: ICD-10-CM

## 2018-05-09 DIAGNOSIS — I87.2 VENOUS STASIS DERMATITIS, UNSPECIFIED LATERALITY: ICD-10-CM

## 2018-05-09 PROCEDURE — 99214 OFFICE O/P EST MOD 30 MIN: CPT | Performed by: NURSE PRACTITIONER

## 2018-05-09 RX ORDER — METHYLPREDNISOLONE 4 MG
TABLET, DOSE PACK ORAL
Qty: 21 TABLET | Refills: 0 | Status: SHIPPED | OUTPATIENT
Start: 2018-05-09 | End: 2018-05-16

## 2018-05-09 RX ORDER — TRIAMCINOLONE ACETONIDE 1 MG/G
CREAM TOPICAL 2 TIMES DAILY
Qty: 60 G | Refills: 3 | Status: CANCELLED | OUTPATIENT
Start: 2018-05-09

## 2018-05-09 RX ORDER — HYDROXYZINE HYDROCHLORIDE 25 MG/1
25-50 TABLET, FILM COATED ORAL EVERY 6 HOURS PRN
Qty: 60 TABLET | Refills: 1 | Status: SHIPPED | OUTPATIENT
Start: 2018-05-09 | End: 2020-01-01

## 2018-05-09 ASSESSMENT — PAIN SCALES - GENERAL: PAINLEVEL: NO PAIN (0)

## 2018-05-09 ASSESSMENT — ANXIETY QUESTIONNAIRES: GAD7 TOTAL SCORE: 7

## 2018-05-09 ASSESSMENT — PATIENT HEALTH QUESTIONNAIRE - PHQ9: SUM OF ALL RESPONSES TO PHQ QUESTIONS 1-9: 7

## 2018-05-09 NOTE — PROGRESS NOTES
SUBJECTIVE:   Romina Chavarria is a 72 year old female who presents to clinic today for the following health issues:    HPI  Rash  Onset: 1 month    Description:   Location:  Forehead, arms, back - starting on chest  Character: round, blotchy, raised, red  Itching (Pruritis): YES    Progression of Symptoms:  worsening and constant    Accompanying Signs & Symptoms:  Fever: no   Body aches or joint pain: no   Sore throat symptoms: no   Recent cold symptoms: no     History:   Previous similar rash: no     Precipitating factors:   Exposure to similar rash: no   New exposures:    Started on Turmeric pills   Recent travel: no     Alleviating factors:  Neosporin    Ran out of kenalog cream    Therapies Tried and outcome:   Neosporin    No new soaps or perfumes  Recent bug bite  She thinks she may have an iodine, but has not had any exposure recently      Problem list and histories reviewed & adjusted, as indicated.  Additional history: as documented        Current Outpatient Prescriptions   Medication Sig Dispense Refill     ferrous gluconate (FERGON) 324 (38 FE) MG tablet Take 1 tablet (324 mg) by mouth 3 times daily (with meals) 100 tablet 11     furosemide (LASIX) 40 MG tablet Take 1 tablet (40 mg) by mouth 2 times daily (Patient taking differently: Take 40 mg by mouth daily ) 180 tablet 3     loratadine (CLARITIN) 10 MG tablet Take 1 tablet by mouth daily.       potassium chloride SA (POTASSIUM CHLORIDE) 20 MEQ CR tablet Take 1 tablet (20 mEq) by mouth 2 times daily 180 tablet 1     sertraline (ZOLOFT) 100 MG tablet Take 1 tablet (100 mg) by mouth daily 90 tablet 3     spironolactone (ALDACTONE) 50 MG tablet Take 1 tablet (50 mg) by mouth 2 times daily 180 tablet 3     traZODone (DESYREL) 50 MG tablet Take 1 tablet (50 mg) by mouth At Bedtime 90 tablet 3     Omega-3 Fatty Acids (FISH OIL TRIPLE STRENGTH) 1400 MG CAPS Take 1 capsule by mouth once daily. Indications: CARDIAC DISEASE       triamcinolone (KENALOG) 0.1 % cream  Apply topically 2 times daily 60 g 3     UNABLE TO FIND MEDICATION NAME: Liver Clense - [employer]  Milk Thistle, Artichoke, Tumeric       BP Readings from Last 3 Encounters:   05/09/18 128/78   02/13/18 124/82   10/26/17 126/78    Wt Readings from Last 3 Encounters:   05/09/18 183 lb 9.6 oz (83.3 kg)   02/13/18 182 lb (82.6 kg)   05/23/17 177 lb (80.3 kg)                    ROS:  CONSTITUTIONAL: NEGATIVE for fever, chills, change in weight  CV: NEGATIVE for chest pain, palpitations or peripheral edema    OBJECTIVE:     /78  Pulse 80  Temp 98.7  F (37.1  C)  Wt 183 lb 9.6 oz (83.3 kg)  BMI 35.86 kg/m2  Body mass index is 35.86 kg/(m^2).  GENERAL: healthy, alert and no distress  SKIN: Scattered rash throughout arms and along forehead region, no signs of infection, peeling appearance.   NEURO: Normal strength and tone, mentation intact and speech normal  PSYCH: mentation appears normal, affect normal/bright    Diagnostic Test Results:  none     ASSESSMENT/PLAN:         ICD-10-CM    1. Dermatitis L30.9 DERMATOLOGY REFERRAL     methylPREDNISolone (MEDROL DOSEPAK) 4 MG tablet     hydrOXYzine (ATARAX) 25 MG tablet   2. Venous stasis dermatitis, unspecified laterality I87.2    3. Atypical mole D22.9 DERMATOLOGY REFERRAL     - Start medrol Dose pack today  - Start atarax as needed at bedtime or when at home. Do not take while driving or operating machinery due to sedation.   - Discussed new medication and side effects.   - Uncertain etiology of rash at this time, patient denies any recent changes in food, environment or laundry items. She has no recent exposure to poison ivy. No history of this rash previously. She does have a recent bug bit along her forearm. We will see if she responds to the Dose Yury.   - Avoid itching as this can increase infection risk.   - Make appointment with dermatology for your additional skin concerns.   - If symptoms worsen, you develop fevers or  chills, please return to clinic.   The patient indicates understanding of these issues and agrees with the plan.    Patient Instructions   - Start medrol Dose pack today  - Start atarax as needed at bedtime or when at home. Do not take while driving or operating machinery due to sedation.   - Avoid itching as this can increase infection risk.   - Make appointment with dermatology for your additional skin concerns.   - If symptoms worsen, you develop fevers or chills, please return to clinic.     MELE Howard CNP, APRN CNP  [facility]

## 2018-05-09 NOTE — PATIENT INSTRUCTIONS
- Start medrol Dose pack today  - Start atarax as needed at bedtime or when at home. Do not take while driving or operating machinery due to sedation.   - Avoid itching as this can increase infection risk.   - Make appointment with dermatology for your additional skin concerns.   - If symptoms worsen, you develop fevers or chills, please return to clinic.     MELE Howard CNP

## 2018-05-09 NOTE — MR AVS SNAPSHOT
After Visit Summary   5/9/2018    Romina Chaavrria    MRN: [id number]           Patient Information     Date Of Birth          1945        Visit Information        Provider Department      5/9/2018 10:40 AM Liz Richard APRN CNP [facility]        Today's Diagnoses     Dermatitis    -  1    Venous stasis dermatitis, unspecified laterality        Atypical mole          Care Instructions    - Start medrol Dose pack today  - Start atarax as needed at bedtime or when at home. Do not take while driving or operating machinery due to sedation.   - Avoid itching as this can increase infection risk.   - Make appointment with dermatology for your additional skin concerns.   - If symptoms worsen, you develop fevers or chills, please return to clinic.     MELE Howard CNP            Follow-ups after your visit        Additional Services     DERMATOLOGY REFERRAL       Your provider has referred you to: [facility]: [facility] [phone]   [email or web address]    Please be aware that coverage of these services is subject to the terms and limitations of your health insurance plan.  Call member services at your health plan with any benefit or coverage questions.      Please bring the following with you to your appointment:    (1) Any X-Rays, CTs or MRIs which have been performed.  Contact the facility where they were done to arrange for  prior to your scheduled appointment.    (2) List of current medications  (3) This referral request   (4) Any documents/labs given to you for this referral                  Follow-up notes from your care team     Return if symptoms worsen or fail to improve.      Your next 10 appointments already scheduled     Aug 07, 2018 10:00 AM CDT   (Arrive by 9:45 AM)   Return General Liver with José Antonio Reese MD   [facility] ([facility] [place])    [address]  [address]  [facility] [address] [phone]              Who to contact     If you have questions or need follow up information about today's clinic visit or your schedule please contact [facility] directly at [phone].  Normal or non-critical lab and imaging results will be communicated to you by MyChart, letter or phone within 4 business days after the clinic has received the results. If you do not hear from us within 7 days, please contact the clinic through [employer] or phone. If you have a critical or abnormal lab result, we will notify you by phone as soon as possible.  Submit refill requests through [employer] or call your pharmacy and they will forward the refill request to us. Please allow 3 business days for your refill to be completed.          Additional Information About Your Visit        MyChart Information     [employer] gives you secure access to your electronic health record. If you see a primary care provider, you can also send messages to your care team and make appointments. If you have questions, please call your primary care clinic.  If you do not have a primary care provider, please call [phone] and they will assist you.        Care EveryWhere ID     This is your Care EveryWhere ID. This could be used by other organizations to access your [facility] medical records  [id number]        Your Vitals Were     Pulse Temperature BMI (Body Mass Index)             80 98.7  F (37.1  C) 35.86 kg/m2          Blood Pressure from Last 3 Encounters:   05/09/18 128/78   02/13/18 124/82   10/26/17 126/78    Weight from Last 3 Encounters:   05/09/18 183 lb 9.6 oz (83.3 kg)   02/13/18 182 lb (82.6 kg)   05/23/17 177 lb (80.3 kg)              We Performed the Following     DERMATOLOGY REFERRAL          Today's Medication Changes          These changes are accurate as of 5/9/18 11:26 AM.  If you have any questions, ask your nurse or doctor.               Start taking  these medicines.        Dose/Directions    hydrOXYzine 25 MG tablet   Commonly known as:  ATARAX   Used for:  Dermatitis   Started by:  Liz Richard APRN CNP        Dose:  25-50 mg   Take 1-2 tablets (25-50 mg) by mouth every 6 hours as needed for itching Do not take while driving or operating machinery due to sedation.   Quantity:  60 tablet   Refills:  1       methylPREDNISolone 4 MG tablet   Commonly known as:  MEDROL DOSEPAK   Used for:  Dermatitis   Started by:  Liz Richard APRN CNP        Follow package instructions   Quantity:  21 tablet   Refills:  0         These medicines have changed or have updated prescriptions.        Dose/Directions    furosemide 40 MG tablet   Commonly known as:  LASIX   This may have changed:  when to take this   Used for:  Alcoholic cirrhosis of liver without ascites (H)        Dose:  40 mg   Take 1 tablet (40 mg) by mouth 2 times daily   Quantity:  180 tablet   Refills:  3            Where to get your medicines      These medications were sent to [facility] [address] [place] [address] [facility]  [address] [facility] [address]     Phone:  [phone]     hydrOXYzine 25 MG tablet    methylPREDNISolone 4 MG tablet                Primary Care Provider Office Phone # Fax #    José Antonio Reese [phone]       [facility] [address] [address]        Equal Access to Services     DESTINY HENRIQUEZ AH: Juliane obrieno Soelizabeth, waaxda luqadaha, qaybta kaalmada adeegyada, jeremy corona. So [facility] [phone].    ATENCIÓN: Si habla español, tiene a quintanilla disposición servicios gratuitos de asistencia lingüística. mac al [phone].    We comply with applicable federal civil rights laws and [place] laws. We do not discriminate on the basis of race, color, national origin, age, disability, sex, sexual orientation, or gender identity.            Thank you!     Thank you for choosing [facility]  for your care. Our goal is always to provide you with excellent care. Hearing back from our patients is one way we can continue to improve our services. Please take a few minutes to complete the written survey that you may receive in the mail after your visit with us. Thank you!             Your Updated Medication List - Protect others around you: Learn how to safely use, store and throw away your medicines at www.disposemymeds.org.          This list is accurate as of 5/9/18 11:26 AM.  Always use your most recent med list.                   Brand Name Dispense Instructions for use Diagnosis    ferrous gluconate 324 (38 Fe) MG tablet    FERGON    100 tablet    Take 1 tablet (324 mg) by mouth 3 times daily (with meals)    Gastrointestinal hemorrhage, unspecified gastrointestinal hemorrhage type       FISH OIL TRIPLE STRENGTH 1400 MG Caps      Take 1 capsule by mouth once daily. Indications: CARDIAC DISEASE        furosemide 40 MG tablet    LASIX    180 tablet    Take 1 tablet (40 mg) by mouth 2 times daily    Alcoholic cirrhosis of liver without ascites (H)       hydrOXYzine 25 MG tablet    ATARAX    60 tablet    Take 1-2 tablets (25-50 mg) by mouth every 6 hours as needed for itching Do not take while driving or operating machinery due to sedation.    Dermatitis       loratadine 10 MG tablet    CLARITIN     Take 1 tablet by mouth daily.        methylPREDNISolone 4 MG tablet    MEDROL DOSEPAK    21 tablet    Follow package instructions    Dermatitis       potassium chloride SA 20 MEQ CR tablet    KLOR-CON    180 tablet    Take 1 tablet (20 mEq) by mouth 2 times daily    Hypokalemia       sertraline 100 MG tablet    ZOLOFT    90 tablet    Take 1 tablet (100 mg) by mouth daily    Anxiety       spironolactone 50 MG tablet    ALDACTONE    180 tablet    Take 1 tablet (50 mg) by mouth 2 times daily    Alcoholic cirrhosis of liver without ascites (H)       traZODone 50 MG tablet    DESYREL    90 tablet     Take 1 tablet (50 mg) by mouth At Bedtime    Anxiety       triamcinolone 0.1 % cream    KENALOG    60 g    Apply topically 2 times daily    Venous stasis dermatitis, unspecified laterality       UNABLE TO FIND      MEDICATION NAME: Liver Clense - [employer] Milk Thistle, Artichoke, Tumeric

## 2018-05-14 ENCOUNTER — TELEPHONE (OUTPATIENT)
Dept: FAMILY MEDICINE | Facility: OTHER | Age: 73
End: 2018-05-14

## 2018-05-14 NOTE — TELEPHONE ENCOUNTER
This should not wait until the 16th to follow up if they are infected. Is she willing to come in tomorrow, I know I am not in.     Liz Richard, APRN CNP

## 2018-05-14 NOTE — TELEPHONE ENCOUNTER
"I spoke with pt who states it is \"kind of better\" Still has rash all over. Has a couple of red marks on her arms that look infected. There are a couple \"little cracked areas\" yellow in color, not red, not draining. She is going to try some cream on them. Not as itchy. Tomorrow is her last dose of medrol.  Has not called Dermatology yet to schedule appt.    Routed to  as FYI    Recommended she F/U in clinic to ensure areas are not infected.    Next 5 appointments (look out 90 days)     May 16, 2018  8:20 AM CDT   [facility] with MELE Alvarado CNP   [facility] [place] [facility]    [address] [place] [address] [address]  [facility] [id number] [phone]                Keesha Brandt, RN, BSN    "

## 2018-05-16 ENCOUNTER — OFFICE VISIT (OUTPATIENT)
Dept: FAMILY MEDICINE | Facility: OTHER | Age: 73
End: 2018-05-16
Payer: COMMERCIAL

## 2018-05-16 VITALS
HEART RATE: 80 BPM | TEMPERATURE: 97.9 F | BODY MASS INDEX: 35.35 KG/M2 | RESPIRATION RATE: 18 BRPM | WEIGHT: 181 LBS | SYSTOLIC BLOOD PRESSURE: 136 MMHG | DIASTOLIC BLOOD PRESSURE: 78 MMHG

## 2018-05-16 DIAGNOSIS — L30.9 DERMATITIS: Primary | ICD-10-CM

## 2018-05-16 PROBLEM — E66.01 MORBID OBESITY (H): Status: ACTIVE | Noted: 2018-05-16

## 2018-05-16 PROCEDURE — 99213 OFFICE O/P EST LOW 20 MIN: CPT | Performed by: NURSE PRACTITIONER

## 2018-05-16 RX ORDER — BENZOCAINE/MENTHOL 6 MG-10 MG
LOZENGE MUCOUS MEMBRANE
Qty: 30 G | Refills: 0 | Status: SHIPPED | OUTPATIENT
Start: 2018-05-16 | End: 2020-01-01

## 2018-05-16 RX ORDER — TRIAMCINOLONE ACETONIDE 1 MG/G
CREAM TOPICAL
Qty: 30 G | Refills: 0 | Status: SHIPPED | OUTPATIENT
Start: 2018-05-16 | End: 2018-11-07

## 2018-05-16 ASSESSMENT — PAIN SCALES - GENERAL: PAINLEVEL: NO PAIN (0)

## 2018-05-16 NOTE — PROGRESS NOTES
"  SUBJECTIVE:   Romina Chavarria is a 72 year old female who presents to clinic today for the following health issues:      HPI    Rash      Onset: 1 month    Description:   Location:  Forehead, arms, back - starting on chest  Character: round, blotchy, raised, red  Itching (Pruritis): YES    Progression of Symptoms:  worsening and constant   ( now 2 days later) - after using prednisone - she is seeing some improvement,  Arms are better -  Her forehead is still very red and feels \"leathery\" to her. -  She is wondering about a cream that could help    Accompanying Signs & Symptoms:  Fever: no   Body aches or joint pain: no   Sore throat symptoms: no   Recent cold symptoms: no     History:   Previous similar rash: no     Precipitating factors:   Exposure to similar rash: no   New exposures:    Started on Turmeric pills   Recent travel: no     Alleviating factors:  Neosporin    Ran out of kenalog cream     Therapies Tried and outcome:   Neosporin     No new soaps or perfumes  Recent bug bite  She thinks she may have an iodine, but has not had any exposure recently    No fevers or chills  No signs of infection    Problem list and histories reviewed & adjusted, as indicated.  Additional history: as documented     Current Outpatient Prescriptions   Medication Sig Dispense Refill     ferrous gluconate (FERGON) 324 (38 FE) MG tablet Take 1 tablet (324 mg) by mouth 3 times daily (with meals) 100 tablet 11     furosemide (LASIX) 40 MG tablet Take 1 tablet (40 mg) by mouth 2 times daily (Patient taking differently: Take 40 mg by mouth daily ) 180 tablet 3     hydrocortisone (CORTAID) 1 % cream Apply sparingly to your face twice daily for 14 days. 30 g 0     hydrOXYzine (ATARAX) 25 MG tablet Take 1-2 tablets (25-50 mg) by mouth every 6 hours as needed for itching Do not take while driving or operating machinery due to sedation. 60 tablet 1     loratadine (CLARITIN) 10 MG tablet Take 1 tablet by mouth daily.       methylPREDNISolone " (MEDROL DOSEPAK) 4 MG tablet Follow package instructions 21 tablet 0     Omega-3 Fatty Acids (FISH OIL TRIPLE STRENGTH) 1400 MG CAPS Take 1 capsule by mouth once daily. Indications: CARDIAC DISEASE       potassium chloride SA (POTASSIUM CHLORIDE) 20 MEQ CR tablet Take 1 tablet (20 mEq) by mouth 2 times daily 180 tablet 1     sertraline (ZOLOFT) 100 MG tablet Take 1 tablet (100 mg) by mouth daily 90 tablet 3     spironolactone (ALDACTONE) 50 MG tablet Take 1 tablet (50 mg) by mouth 2 times daily 180 tablet 3     traZODone (DESYREL) 50 MG tablet Take 1 tablet (50 mg) by mouth At Bedtime 90 tablet 3     triamcinolone (KENALOG) 0.1 % cream Apply topically 2 times daily 60 g 3     triamcinolone (KENALOG) 0.1 % cream Apply sparingly to your arms twice daily for 14 days. 30 g 0     UNABLE TO FIND MEDICATION NAME: Liver Dane - [employer]  Milk Thistle, Artichoke, Tumeric           ROS:  CONSTITUTIONAL: NEGATIVE for fever, chills, change in weight  CV: NEGATIVE for chest pain, palpitations or peripheral edema    OBJECTIVE:     /78  Pulse 80  Temp 97.9  F (36.6  C)  Resp 18  Wt 181 lb (82.1 kg)  BMI 35.35 kg/m2  Body mass index is 35.35 kg/(m^2).  GENERAL: healthy, alert and no distress  SKIN: forehead- scattered flaky skin with rash without signs of infection slightly improved from last week. Bilateral arms with scattered papules some scabbed over.   NEURO: Normal strength and tone, mentation intact and speech normal  PSYCH: mentation appears normal, affect normal/bright    Diagnostic Test Results:  none     ASSESSMENT/PLAN:       1. Dermatitis  - At this time the Mederol- dose bhavik did give some improvements. I would like to try to do a cream to see if this would help. I also feel that some of this could be dry skin related as she notes she showers twice daily. I have advised her on avoiding this and also prescribed some hydrocortisone cream for her face and triamcinolone  cream for her arms. Would like her to try this and if any worsening symptoms let me know. Next step would be to see dermatology and get a biopsy.   - I also educated her on creams OTC   - No signs of infection today.   - hydrocortisone (CORTAID) 1 % cream; Apply sparingly to your face twice daily for 14 days.  Dispense: 30 g; Refill: 0  - triamcinolone (KENALOG) 0.1 % cream; Apply sparingly to your arms twice daily for 14 days.  Dispense: 30 g; Refill: 0    The patient indicates understanding of these issues and agrees with the plan.    Patient Instructions   - Start hydrocoritsone cream 1 % on face only twice daily   - Start triamcinolone (kenalog) cream on both arms only twice daily   - Use steroid creams for 14 days, no longer.   - Start emollient cream over the counter as listed below  -Follow up with dermatology    Water can be your best friend or worst enemy.    If water is not your enemy, shower/bath daily.    NEVER soak in bubble bath, oils, etc.    Keep them to 15-30 minutes in lukewarm (NOT HOT) water.  After shower, pat/blot dry and apply moisturizer within minutes    Products that are advised for eczema include:  Soaps -- Dove, Caress, or Basis (only need in underarms and groin unless dirt noted)  Lotions -- Cera Ve, Cetaphil, Vanicream, Vaseline  Petroleum jelly can be used for extra moisturizer when needed but is greasy.      MELE Howard [facility]

## 2018-05-16 NOTE — PATIENT INSTRUCTIONS
- Start hydrocoritsone cream 1 % on face only twice daily   - Start triamcinolone (kenalog) cream on both arms only twice daily   - Use steroid creams for 14 days, no longer.   - Start emollient cream over the counter as listed below  -Follow up with dermatology    Water can be your best friend or worst enemy.    If water is not your enemy, shower/bath daily.    NEVER soak in bubble bath, oils, etc.    Keep them to 15-30 minutes in lukewarm (NOT HOT) water.  After shower, pat/blot dry and apply moisturizer within minutes    Products that are advised for eczema include:  Soaps -- Dove, Caress, or Basis (only need in underarms and groin unless dirt noted)  Lotions -- Cera Ve, Cetaphil, Vanicream, Vaseline  Petroleum jelly can be used for extra moisturizer when needed but is greasy.

## 2018-05-16 NOTE — PROGRESS NOTES
"      Rash      Onset: 1 month    Description:   Location:  Forehead, arms, back - starting on chest  Character: round, blotchy, raised, red  Itching (Pruritis): YES    Progression of Symptoms:  worsening and constant   ( now 2 days later - after using prednisone - she is seeing some improvement,  Arms are better -  Her forehead is still very red and feels \"leathery\" to her. -  She is wondering about a cream that could help    Accompanying Signs & Symptoms:  Fever: no   Body aches or joint pain: no   Sore throat symptoms: no   Recent cold symptoms: no     History:   Previous similar rash: no     Precipitating factors:   Exposure to similar rash: no   New exposures:    Started on Turmeric pills   Recent travel: no     Alleviating factors:  Neosporin    Ran out of kenalog cream     Therapies Tried and outcome:   Neosporin     No new soaps or perfumes  Recent bug bite  She thinks she may have an iodine, but has not had any exposure recently      Problem list and histories reviewed & adjusted, as indicated.  Additional history: as documented     "

## 2018-05-16 NOTE — MR AVS SNAPSHOT
After Visit Summary   5/16/2018    Romina Chavarria    MRN: [id number]           Patient Information     Date Of Birth          1945        Visit Information        Provider Department      5/16/2018 8:20 AM Liz Richard APRN CNP [facility]        Today's Diagnoses     Dermatitis    -  1      Care Instructions    - Start hydrocoritsone cream 1 % on face only twice daily   - Start triamcinolone (kenalog) cream on both arms only twice daily   - Use steroid creams for 14 days, no longer.   - Start emollient cream over the counter as listed below  -Follow up with dermatology    Water can be your best friend or worst enemy.    If water is not your enemy, shower/bath daily.    NEVER soak in bubble bath, oils, etc.    Keep them to 15-30 minutes in lukewarm (NOT HOT) water.  After shower, pat/blot dry and apply moisturizer within minutes    Products that are advised for eczema include:  Soaps -- Dove, Caress, or Basis (only need in underarms and groin unless dirt noted)  Lotions -- Cera Ve, Cetaphil, Vanicream, Vaseline  Petroleum jelly can be used for extra moisturizer when needed but is greasy.          Follow-ups after your visit        Follow-up notes from your care team     Return in about 1 month (around 6/16/2018).      Your next 10 appointments already scheduled     Aug 07, 2018 10:00 AM CDT   (Arrive by 9:45 AM)   Return General Liver with José Antonio Reese MD   [facility] ([facility] [place])    [address] [place] [address]  [address]  [facility] [address] [phone]              Who to contact     If you have questions or need follow up information about today's clinic visit or your schedule please contact [facility] directly at [phone].  Normal or non-critical lab and imaging results will be communicated to you by MyChart, letter or phone within 4 business days after the clinic has received the results. If you do not hear from us  within 7 days, please contact the clinic through [facility] or phone. If you have a critical or abnormal lab result, we will notify you by phone as soon as possible.  Submit refill requests through [employer] or call your pharmacy and they will forward the refill request to us. Please allow 3 business days for your refill to be completed.          Additional Information About Your Visit        [employer] Information     [employer] gives you secure access to your electronic health record. If you see a primary care provider, you can also send messages to your care team and make appointments. If you have questions, please call your primary care clinic.  If you do not have a primary care provider, please call [phone] and they will assist you.        Care EveryWhere ID     This is your Care EveryWhere ID. This could be used by other organizations to access your [place] medical records  [id number]        Your Vitals Were     Pulse Temperature Respirations BMI (Body Mass Index)          80 97.9  F (36.6  C) 18 35.35 kg/m2         Blood Pressure from Last 3 Encounters:   05/16/18 136/78   05/09/18 128/78   02/13/18 124/82    Weight from Last 3 Encounters:   05/16/18 181 lb (82.1 kg)   05/09/18 183 lb 9.6 oz (83.3 kg)   02/13/18 182 lb (82.6 kg)              Today, you had the following     No orders found for display         Today's Medication Changes          These changes are accurate as of 5/16/18  9:19 AM.  If you have any questions, ask your nurse or doctor.               Start taking these medicines.        Dose/Directions    hydrocortisone 1 % cream   Commonly known as:  CORTAID   Used for:  Dermatitis   Started by:  Liz Richard APRN CNP        Apply sparingly to your face twice daily for 14 days.   Quantity:  30 g   Refills:  0         These medicines have changed or have updated prescriptions.        Dose/Directions    furosemide 40 MG tablet   Commonly known as:  LASIX   This may have changed:  when to take  this   Used for:  Alcoholic cirrhosis of liver without ascites (H)        Dose:  40 mg   Take 1 tablet (40 mg) by mouth 2 times daily   Quantity:  180 tablet   Refills:  3       * triamcinolone 0.1 % cream   Commonly known as:  KENALOG   This may have changed:  Another medication with the same name was added. Make sure you understand how and when to take each.   Used for:  Venous stasis dermatitis, unspecified laterality   Changed by:  Liz Richard APRN CNP        Apply topically 2 times daily   Quantity:  60 g   Refills:  3       * triamcinolone 0.1 % cream   Commonly known as:  KENALOG   This may have changed:  You were already taking a medication with the same name, and this prescription was added. Make sure you understand how and when to take each.   Used for:  Dermatitis   Changed by:  Liz Richard APRN CNP        Apply sparingly to your arms twice daily for 14 days.   Quantity:  30 g   Refills:  0       * Notice:  This list has 2 medication(s) that are the same as other medications prescribed for you. Read the directions carefully, and ask your doctor or other care provider to review them with you.         Where to get your medicines      These medications were sent to [facility] [address] [place] [address] [address] [address]     Phone:  [phone]     hydrocortisone 1 % cream    triamcinolone 0.1 % cream                Primary Care Provider Office Phone # Fax #    Monique Ambriz [phone]       [address] [place] [address]        Equal Access to Services     [facility] HospitalTAMMY AH: Hadii veronica ku hadasho Soomaali, waaxda luqadaha, qaybta kaalmada adeegyada, jeremy pederson . So [facility] [phone].    ATENCIÓN: Si habla español, tiene a quintanilla disposición servicios gratuitos de asistencia lingüística. Llame al [phone].    We comply with applicable federal civil rights laws and [place] laws. We do not discriminate  on the basis of race, color, national origin, age, disability, sex, sexual orientation, or gender identity.            Thank you!     Thank you for choosing [facility]  for your care. Our goal is always to provide you with excellent care. Hearing back from our patients is one way we can continue to improve our services. Please take a few minutes to complete the written survey that you may receive in the mail after your visit with us. Thank you!             Your Updated Medication List - Protect others around you: Learn how to safely use, store and throw away your medicines at www.disposemymeds.org.          This list is accurate as of 5/16/18  9:19 AM.  Always use your most recent med list.                   Brand Name Dispense Instructions for use Diagnosis    ferrous gluconate 324 (38 Fe) MG tablet    FERGON    100 tablet    Take 1 tablet (324 mg) by mouth 3 times daily (with meals)    Gastrointestinal hemorrhage, unspecified gastrointestinal hemorrhage type       FISH OIL TRIPLE STRENGTH 1400 MG Caps      Take 1 capsule by mouth once daily. Indications: CARDIAC DISEASE        furosemide 40 MG tablet    LASIX    180 tablet    Take 1 tablet (40 mg) by mouth 2 times daily    Alcoholic cirrhosis of liver without ascites (H)       hydrocortisone 1 % cream    CORTAID    30 g    Apply sparingly to your face twice daily for 14 days.    Dermatitis       hydrOXYzine 25 MG tablet    ATARAX    60 tablet    Take 1-2 tablets (25-50 mg) by mouth every 6 hours as needed for itching Do not take while driving or operating machinery due to sedation.    Dermatitis       loratadine 10 MG tablet    CLARITIN     Take 1 tablet by mouth daily.        methylPREDNISolone 4 MG tablet    MEDROL DOSEPAK    21 tablet    Follow package instructions    Dermatitis       potassium chloride SA 20 MEQ CR tablet    KLOR-CON    180 tablet    Take 1 tablet (20 mEq) by mouth 2 times daily    Hypokalemia       sertraline 100 MG tablet     ZOLOFT    90 tablet    Take 1 tablet (100 mg) by mouth daily    Anxiety       spironolactone 50 MG tablet    ALDACTONE    180 tablet    Take 1 tablet (50 mg) by mouth 2 times daily    Alcoholic cirrhosis of liver without ascites (H)       traZODone 50 MG tablet    DESYREL    90 tablet    Take 1 tablet (50 mg) by mouth At Bedtime    Anxiety       * triamcinolone 0.1 % cream    KENALOG    60 g    Apply topically 2 times daily    Venous stasis dermatitis, unspecified laterality       * triamcinolone 0.1 % cream    KENALOG    30 g    Apply sparingly to your arms twice daily for 14 days.    Dermatitis       UNABLE TO FIND      MEDICATION NAME: Liver Dane - [employer] by Kevon Martínez [employer] Milk Thistle, Artichoke, Tumeric        * Notice:  This list has 2 medication(s) that are the same as other medications prescribed for you. Read the directions carefully, and ask your doctor or other care provider to review them with you.

## 2018-05-29 ENCOUNTER — TELEPHONE (OUTPATIENT)
Dept: FAMILY MEDICINE | Facility: OTHER | Age: 73
End: 2018-05-29

## 2018-05-29 NOTE — TELEPHONE ENCOUNTER
You placed a referral for patient to dermatology on 5/9/18.  Patient has not scheduled as of yet.      Please review and forward to team if follow up with the patient is needed.     Thank you!  Deepti/Clinic Referrals Dyad II

## 2018-06-12 DIAGNOSIS — E87.6 HYPOKALEMIA: ICD-10-CM

## 2018-06-14 RX ORDER — POTASSIUM CHLORIDE 1500 MG/1
20 TABLET, EXTENDED RELEASE ORAL 2 TIMES DAILY
Qty: 180 TABLET | Refills: 1 | Status: SHIPPED | OUTPATIENT
Start: 2018-06-14 | End: 2018-10-31

## 2018-06-18 DIAGNOSIS — I87.2 VENOUS STASIS DERMATITIS, UNSPECIFIED LATERALITY: ICD-10-CM

## 2018-06-19 NOTE — TELEPHONE ENCOUNTER
"Requested Prescriptions   Pending Prescriptions Disp Refills     triamcinolone (KENALOG) 0.1 % cream 60 g 3     Sig: Apply topically 2 times daily    Topical Steroid Protocol Passed    6/18/2018  2:00 PM       Passed - Patient is age 6 or older       Passed - Authorizing prescriber's most recent note related to this medication read.       Passed - High potency steroid not ordered       Passed - Recent (12 mo) or future (30 days) visit within the authorizing provider's specialty    Patient had office visit in the last 12 months or has a visit in the next 30 days with authorizing provider or within the authorizing provider's specialty.  See \"Patient Info\" tab in inbasket, or \"Choose Columns\" in Meds & Orders section of the refill encounter.            triamcinolone (KENALOG) 0.1 % cream  Routing refill request to provider for review/approval because:  Drug not on the [facility] refill protocol for acute concern, 14 day course   Referred to dermatology     Sakina Hines RN, BSN         "

## 2018-06-20 RX ORDER — TRIAMCINOLONE ACETONIDE 1 MG/G
CREAM TOPICAL 2 TIMES DAILY
Qty: 60 G | Refills: 3 | OUTPATIENT
Start: 2018-06-20

## 2018-06-22 ENCOUNTER — TELEPHONE (OUTPATIENT)
Dept: GASTROENTEROLOGY | Facility: CLINIC | Age: 73
End: 2018-06-22

## 2018-06-22 DIAGNOSIS — K92.2 GASTROINTESTINAL HEMORRHAGE, UNSPECIFIED GASTROINTESTINAL HEMORRHAGE TYPE: ICD-10-CM

## 2018-06-22 RX ORDER — FERROUS GLUCONATE 324(38)MG
324 TABLET ORAL
Qty: 100 TABLET | Refills: 10 | Status: SHIPPED | OUTPATIENT
Start: 2018-06-22 | End: 2019-02-20

## 2018-06-22 NOTE — TELEPHONE ENCOUNTER
[facility] Call Center    Phone Message    May a detailed message be left on voicemail: yes    Reason for Call: Medication Refill Request    Has the patient contacted the pharmacy for the refill? Yes   Name of medication being requested: ferrous gluconate (FERGON) 324 (38 FE) MG tablet  Provider who prescribed the medication: [place]  Pharmacy:[facility] in [place] Phone: [phone]    Date medication is needed: patient has been out for 2 weeks        Action Taken: Message routed to:  [facility] liver

## 2018-06-27 ENCOUNTER — TELEPHONE (OUTPATIENT)
Dept: FAMILY MEDICINE | Facility: OTHER | Age: 73
End: 2018-06-27

## 2018-06-27 DIAGNOSIS — I87.2 VENOUS STASIS DERMATITIS, UNSPECIFIED LATERALITY: ICD-10-CM

## 2018-06-27 RX ORDER — TRIAMCINOLONE ACETONIDE 1 MG/G
CREAM TOPICAL 2 TIMES DAILY
Qty: 60 G | Refills: 3 | OUTPATIENT
Start: 2018-06-27

## 2018-06-27 NOTE — TELEPHONE ENCOUNTER
Prescribed for an acute visit. Is this worsening? Where is the rash located?  Recommend OV.     MELE Howard CNP

## 2018-06-27 NOTE — TELEPHONE ENCOUNTER
"Kenalog cream-  Routing refill request to provider for review/approval because:  Drug not on the [facility] refill protocol: prescribed for acute visit.      Requested Prescriptions   Pending Prescriptions Disp Refills     triamcinolone (KENALOG) 0.1 % cream 60 g 3     Sig: Apply topically 2 times daily    Topical Steroid Protocol Passed    6/27/2018 12:15 PM       Passed - Patient is age 6 or older       Passed - Authorizing prescriber's most recent note related to this medication read.       Passed - High potency steroid not ordered       Passed - Recent (12 mo) or future (30 days) visit within the authorizing provider's specialty    Patient had office visit in the last 12 months or has a visit in the next 30 days with authorizing provider or within the authorizing provider's specialty.  See \"Patient Info\" tab in inbasket, or \"Choose Columns\" in Meds & Orders section of the refill encounter.            Luann Sanchez RN, BSN    "

## 2018-06-28 NOTE — TELEPHONE ENCOUNTER
Patient stated she did not need a refill on this Rx so we can cancel this and close encounter. Florina Guzman, [facility]

## 2018-08-13 ENCOUNTER — MYC MEDICAL ADVICE (OUTPATIENT)
Dept: FAMILY MEDICINE | Facility: OTHER | Age: 73
End: 2018-08-13

## 2018-10-31 ENCOUNTER — ALLIED HEALTH/NURSE VISIT (OUTPATIENT)
Dept: FAMILY MEDICINE | Facility: OTHER | Age: 73
End: 2018-10-31
Payer: COMMERCIAL

## 2018-10-31 ENCOUNTER — RADIANT APPOINTMENT (OUTPATIENT)
Dept: ULTRASOUND IMAGING | Facility: OTHER | Age: 73
End: 2018-10-31
Attending: INTERNAL MEDICINE
Payer: COMMERCIAL

## 2018-10-31 DIAGNOSIS — E87.6 HYPOKALEMIA: ICD-10-CM

## 2018-10-31 DIAGNOSIS — K70.30 ALCOHOLIC CIRRHOSIS OF LIVER WITHOUT ASCITES (H): ICD-10-CM

## 2018-10-31 DIAGNOSIS — Z23 NEED FOR PROPHYLACTIC VACCINATION AND INOCULATION AGAINST INFLUENZA: Primary | ICD-10-CM

## 2018-10-31 LAB
ALBUMIN SERPL-MCNC: 3.4 G/DL (ref 3.4–5)
ALP SERPL-CCNC: 72 U/L (ref 40–150)
ALT SERPL W P-5'-P-CCNC: 38 U/L (ref 0–50)
ANION GAP SERPL CALCULATED.3IONS-SCNC: 12 MMOL/L (ref 3–14)
AST SERPL W P-5'-P-CCNC: 30 U/L (ref 0–45)
BILIRUB DIRECT SERPL-MCNC: 0.2 MG/DL (ref 0–0.2)
BILIRUB SERPL-MCNC: 0.6 MG/DL (ref 0.2–1.3)
BUN SERPL-MCNC: 12 MG/DL (ref 7–30)
CALCIUM SERPL-MCNC: 8.8 MG/DL (ref 8.5–10.1)
CHLORIDE SERPL-SCNC: 106 MMOL/L (ref 94–109)
CO2 SERPL-SCNC: 25 MMOL/L (ref 20–32)
CREAT SERPL-MCNC: 0.84 MG/DL (ref 0.52–1.04)
ERYTHROCYTE [DISTWIDTH] IN BLOOD BY AUTOMATED COUNT: 13.7 % (ref 10–15)
GFR SERPL CREATININE-BSD FRML MDRD: 67 ML/MIN/1.7M2
GLUCOSE SERPL-MCNC: 157 MG/DL (ref 70–99)
HCT VFR BLD AUTO: 39.7 % (ref 35–47)
HGB BLD-MCNC: 14.2 G/DL (ref 11.7–15.7)
INR PPP: 1.12 (ref 0.86–1.14)
MCH RBC QN AUTO: 34.8 PG (ref 26.5–33)
MCHC RBC AUTO-ENTMCNC: 35.8 G/DL (ref 31.5–36.5)
MCV RBC AUTO: 97 FL (ref 78–100)
PLATELET # BLD AUTO: 135 10E9/L (ref 150–450)
POTASSIUM SERPL-SCNC: 3.7 MMOL/L (ref 3.4–5.3)
PROT SERPL-MCNC: 7.2 G/DL (ref 6.8–8.8)
RBC # BLD AUTO: 4.08 10E12/L (ref 3.8–5.2)
SODIUM SERPL-SCNC: 143 MMOL/L (ref 133–144)
WBC # BLD AUTO: 3 10E9/L (ref 4–11)

## 2018-10-31 PROCEDURE — 85610 PROTHROMBIN TIME: CPT | Performed by: INTERNAL MEDICINE

## 2018-10-31 PROCEDURE — 90662 IIV NO PRSV INCREASED AG IM: CPT

## 2018-10-31 PROCEDURE — 76700 US EXAM ABDOM COMPLETE: CPT

## 2018-10-31 PROCEDURE — 85027 COMPLETE CBC AUTOMATED: CPT | Performed by: INTERNAL MEDICINE

## 2018-10-31 PROCEDURE — 80076 HEPATIC FUNCTION PANEL: CPT | Performed by: INTERNAL MEDICINE

## 2018-10-31 PROCEDURE — 80048 BASIC METABOLIC PNL TOTAL CA: CPT | Performed by: INTERNAL MEDICINE

## 2018-10-31 PROCEDURE — 99207 ZZC NO CHARGE NURSE ONLY: CPT

## 2018-10-31 PROCEDURE — G0008 ADMIN INFLUENZA VIRUS VAC: HCPCS

## 2018-10-31 PROCEDURE — 36415 COLL VENOUS BLD VENIPUNCTURE: CPT | Performed by: INTERNAL MEDICINE

## 2018-10-31 NOTE — MR AVS SNAPSHOT
After Visit Summary   10/31/2018    Romina Chavarria    MRN: [id number]           Patient Information     Date Of Birth          1945        Visit Information        Provider Department      10/31/2018 11:00 AM NL FLU SHOT [facility]        Today's Diagnoses     Need for prophylactic vaccination and inoculation against influenza    -  1       Follow-ups after your visit        Your next 10 appointments already scheduled     Oct 31, 2018 10:00 AM CDT   LAB [facility]    [address] [facility] [address]   [phone]           Please do not eat 10-12 hours before your appointment if you are coming in fasting for labs on lipids, cholesterol, or glucose (sugar). This does not apply to pregnant women. Water, hot tea and black coffee (with nothing added) are okay. Do not drink other fluids, diet soda or chew gum.            Oct 31, 2018 11:00 AM CDT   Nurse Only with NL FLU SHOT [facility]   [facility]    [address] [address]   [phone]            Nov 06, 2018 10:00 AM CST   MA DIAGNOSTIC BILATERAL W/ JOANN with PHMA1, PH RAD   [facility]    [address] [facility] [address] [phone]           How do I prepare for my exam? (Food and drink instructions) No Food and Drink Restrictions.  How do I prepare for my exam? (Other instructions) Do not use any powder, lotion or deodorant under your arms or on your breast. If you do, we will ask you to remove it before your exam.  What should I wear: Wear comfortable, two-piece clothing.  How long does the exam take: Most scans will take 15 minutes.  What should I bring: Bring any previous mammograms from other facilities or have them mailed to the breast center.  Do I need a :  No  is needed.  What do I need to tell my doctor: If you have  "any allergies, tell your care team.  What should I do after the exam: No restrictions, You may resume normal activities.  What is this test: This test is an x-ray of the breast to look for breast disease. The breast is pressed between two plates to flatten and spread the tissue. An X-ray is taken of the breast from different angles.  Who should I call with questions: If you have any questions, please call the Imaging Department where you will have your exam. Directions, parking instructions, and other information is available on our website, [email or web address].  Other information about my exam Three-dimensional (3D) mammograms are available at [facility] locations in [facility], [facility], [facility], [place], and [place]. [facility] locations include [place] and [facility] [place] in [place].  Benefits of 3D mammograms include: * Improved rate of cancer detection * Decreases your chance of having to go back for more tests, which means fewer: * \"False-positive\" results (This means that there is an abnormal area but it isn't cancer.) * Invasive testing procedures, such as a biopsy or surgery * Can provide clearer images of the breast if you have dense breast tissue.  *3D mammography is an optional exam that anyone can have with a 2D mammogram. It doesn't replace or take the place of a 2D mammogram. 2D mammograms remain an effective screening test for all women.  Not all insurance companies cover the cost of a 3D mammogram. Check with your insurance.            Nov 06, 2018 10:30 AM CST   US BREAST RIGHT LIMITED 1-3 QUAD with PHUS2, PH RAD   [facility] ([facility])    [address] [place] [address] [phone]           How do I prepare for my exam? (Food and drink instructions) No Food and Drink Restrictions.  How do I prepare for my exam? (Other instructions) You do not need to do anything special to prepare for your exam.  " What should I wear: Wear comfortable clothes.  How long does the exam take: Most ultrasounds take 30 to 60 minutes.  What should I bring: Bring a list of your medicines, including vitamins, minerals and over-the-counter drugs. It is safest to leave personal items at home.  Do I need a :  No  is needed.  What do I need to tell my doctor: Tell your doctor about any allergies you may have.  What should I do after the exam: No restrictions, You may resume normal activities.  What is this test: An ultrasound uses sound waves to make pictures of the body. Sound waves do not cause pain. The only discomfort may be the pressure of the wand against your skin or full bladder.  Who should I call with questions: If you have any questions, please call the Imaging Department where you will have your exam. Directions, parking instructions, and other information is available on our website, [email or web address].            Nov 07, 2018  2:15 PM CST   (Arrive by 2:00 PM)   Return General Liver with José Antonio Reese MD   [facility] ([facility])    [address] [place] [address]  [address] [place] [address] [phone]              Who to contact     If you have questions or need follow up information about today's clinic visit or your schedule please contact [facility] directly at [phone].  Normal or non-critical lab and imaging results will be communicated to you by MyChart, letter or phone within 4 business days after the clinic has received the results. If you do not hear from us within 7 days, please contact the clinic through MyChart or phone. If you have a critical or abnormal lab result, we will notify you by phone as soon as possible.  Submit refill requests through [employer] or call your pharmacy and they will forward the refill request to us. Please allow 3 business days for your refill to be completed.          Additional Information About Your Visit         [employer] Information     [employer] gives you secure access to your electronic health record. If you see a primary care provider, you can also send messages to your care team and make appointments. If you have questions, please call your primary care clinic.  If you do not have a primary care provider, please call [phone] and they will assist you.        Care EveryWhere ID     This is your Care EveryWhere ID. This could be used by other organizations to access your [place] medical records  [id number]         Blood Pressure from Last 3 Encounters:   05/16/18 136/78   05/09/18 128/78   02/13/18 124/82    Weight from Last 3 Encounters:   05/16/18 181 lb (82.1 kg)   05/09/18 183 lb 9.6 oz (83.3 kg)   02/13/18 182 lb (82.6 kg)              We Performed the Following     FLU VACCINE, INCREASED ANTIGEN, PRESV FREE, AGE 65+ [85497]     Vaccine Administration, Initial [81815]          Today's Medication Changes          These changes are accurate as of 10/31/18  9:52 AM.  If you have any questions, ask your nurse or doctor.               These medicines have changed or have updated prescriptions.        Dose/Directions    furosemide 40 MG tablet   Commonly known as:  LASIX   This may have changed:  when to take this   Used for:  Alcoholic cirrhosis of liver without ascites (H)        Dose:  40 mg   Take 1 tablet (40 mg) by mouth 2 times daily   Quantity:  180 tablet   Refills:  3                Primary Care Provider Office Phone # Fax #    Monique Ambriz [phone]       [address] [place] [address]        Equal Access to Services     TIMA HENRIQUEZ : Hadruiz morrison Soelizabeth, waaxda luqadaha, qaybta kaalmada jeremy story. So [facility] [phone].    ATENCIÓN: Si habla español, tiene a quintanilla disposición servicios gratuitos de asistencia lingüística. Llame al [phone].    We comply with applicable federal civil rights laws and [place] laws. We do not  discriminate on the basis of race, color, national origin, age, disability, sex, sexual orientation, or gender identity.            Thank you!     Thank you for choosing [facility]  for your care. Our goal is always to provide you with excellent care. Hearing back from our patients is one way we can continue to improve our services. Please take a few minutes to complete the written survey that you may receive in the mail after your visit with us. Thank you!             Your Updated Medication List - Protect others around you: Learn how to safely use, store and throw away your medicines at www.disposemymeds.org.          This list is accurate as of 10/31/18  9:52 AM.  Always use your most recent med list.                   Brand Name Dispense Instructions for use Diagnosis    ferrous gluconate 324 (38 Fe) MG tablet    FERGON    100 tablet    Take 1 tablet (324 mg) by mouth 3 times daily (with meals)    Gastrointestinal hemorrhage, unspecified gastrointestinal hemorrhage type       FISH OIL TRIPLE STRENGTH 1400 MG Caps      Take 1 capsule by mouth once daily. Indications: CARDIAC DISEASE        furosemide 40 MG tablet    LASIX    180 tablet    Take 1 tablet (40 mg) by mouth 2 times daily    Alcoholic cirrhosis of liver without ascites (H)       hydrocortisone 1 % cream    CORTAID    30 g    Apply sparingly to your face twice daily for 14 days.    Dermatitis       hydrOXYzine 25 MG tablet    ATARAX    60 tablet    Take 1-2 tablets (25-50 mg) by mouth every 6 hours as needed for itching Do not take while driving or operating machinery due to sedation.    Dermatitis       loratadine 10 MG tablet    CLARITIN     Take 1 tablet by mouth daily.        potassium chloride SA 20 MEQ CR tablet    KLOR-CON    180 tablet    Take 1 tablet (20 mEq) by mouth 2 times daily    Hypokalemia       sertraline 100 MG tablet    ZOLOFT    90 tablet    Take 1 tablet (100 mg) by mouth daily    Anxiety       spironolactone 50 MG  tablet    ALDACTONE    180 tablet    Take 1 tablet (50 mg) by mouth 2 times daily    Alcoholic cirrhosis of liver without ascites (H)       traZODone 50 MG tablet    DESYREL    90 tablet    Take 1 tablet (50 mg) by mouth At Bedtime    Anxiety       * triamcinolone 0.1 % cream    KENALOG    60 g    Apply topically 2 times daily    Venous stasis dermatitis, unspecified laterality       * triamcinolone 0.1 % cream    KENALOG    30 g    Apply sparingly to your arms twice daily for 14 days.    Dermatitis       UNABLE TO FIND      MEDICATION NAME: Liver Dane - [employer] by Kevon Martínez [employer] Milk Thistle, Artichoke, Tumeric        * Notice:  This list has 2 medication(s) that are the same as other medications prescribed for you. Read the directions carefully, and ask your doctor or other care provider to review them with you.

## 2018-10-31 NOTE — PROGRESS NOTES
Injectable Influenza Immunization Documentation    1.  Is the person to be vaccinated sick today?   No    2. Does the person to be vaccinated have an allergy to a component   of the vaccine?   No  Egg Allergy Algorithm Link    3. Has the person to be vaccinated ever had a serious reaction   to influenza vaccine in the past?   No    4. Has the person to be vaccinated ever had Guillain-Barré syndrome?   No    Form completed by Romina Chavarria    Prior to injection verified patient identity using patient's name and date of birth. Susu Paul, CMA

## 2018-11-01 RX ORDER — POTASSIUM CHLORIDE 1500 MG/1
TABLET, EXTENDED RELEASE ORAL
Qty: 180 TABLET | Refills: 3 | Status: SHIPPED | OUTPATIENT
Start: 2018-11-01 | End: 2019-02-20

## 2018-11-06 ENCOUNTER — HOSPITAL ENCOUNTER (OUTPATIENT)
Dept: ULTRASOUND IMAGING | Facility: CLINIC | Age: 73
Discharge: HOME | End: 2018-11-06
Attending: INTERNAL MEDICINE | Admitting: INTERNAL MEDICINE
Payer: MEDICARE

## 2018-11-06 ENCOUNTER — HOSPITAL ENCOUNTER (OUTPATIENT)
Dept: MAMMOGRAPHY | Facility: CLINIC | Age: 73
End: 2018-11-06
Attending: INTERNAL MEDICINE
Payer: MEDICARE

## 2018-11-06 DIAGNOSIS — N60.01 CYST OF RIGHT BREAST: ICD-10-CM

## 2018-11-06 DIAGNOSIS — Z12.31 VISIT FOR SCREENING MAMMOGRAM: ICD-10-CM

## 2018-11-06 PROCEDURE — 76642 ULTRASOUND BREAST LIMITED: CPT | Mod: RT

## 2018-11-06 PROCEDURE — 77063 BREAST TOMOSYNTHESIS BI: CPT

## 2018-11-07 ENCOUNTER — OFFICE VISIT (OUTPATIENT)
Dept: GASTROENTEROLOGY | Facility: CLINIC | Age: 73
End: 2018-11-07
Attending: INTERNAL MEDICINE
Payer: MEDICARE

## 2018-11-07 VITALS
RESPIRATION RATE: 16 BRPM | HEIGHT: 60 IN | OXYGEN SATURATION: 95 % | HEART RATE: 82 BPM | WEIGHT: 176.2 LBS | SYSTOLIC BLOOD PRESSURE: 133 MMHG | BODY MASS INDEX: 34.59 KG/M2 | DIASTOLIC BLOOD PRESSURE: 81 MMHG

## 2018-11-07 DIAGNOSIS — K70.30 ALCOHOLIC CIRRHOSIS OF LIVER WITHOUT ASCITES (H): Primary | ICD-10-CM

## 2018-11-07 PROCEDURE — G0463 HOSPITAL OUTPT CLINIC VISIT: HCPCS | Mod: ZF

## 2018-11-07 ASSESSMENT — PAIN SCALES - GENERAL: PAINLEVEL: NO PAIN (0)

## 2018-11-07 NOTE — PROGRESS NOTES
I had the pleasure of seeing Romina Chavarria for followup in the Liver Clinic at the [facility] on 11/07/2018.  Ms. Chavarria returns for followup of alcoholic cirrhosis.      She is doing well at this visit.  She denies any abdominal pain.  She does complain of some itching, but it is around her pannus.  She does have a moderate amount of fatigue.  She denies any increased abdominal girth or lower extremity edema.  She denies any fevers or chills, cough or shortness of breath.  She denies any nausea or vomiting, diarrhea or constipation.  Her appetite has been good.  Her weight unfortunately is unchanged.  She does need to lose some weight.  She has not had any gastrointestinal bleeding or any overt signs of hepatic encephalopathy, and there have not been any other new events since she was last seen.       Current Outpatient Prescriptions   Medication     Coenzyme Q10 (CO Q 10 PO)     ferrous gluconate (FERGON) 324 (38 Fe) MG tablet     furosemide (LASIX) 40 MG tablet     hydrocortisone (CORTAID) 1 % cream     hydrOXYzine (ATARAX) 25 MG tablet     KLOR-CON 20 MEQ CR tablet     loratadine (CLARITIN) 10 MG tablet     Omega-3 Fatty Acids (FISH OIL TRIPLE STRENGTH) 1400 MG CAPS     sertraline (ZOLOFT) 100 MG tablet     spironolactone (ALDACTONE) 50 MG tablet     traZODone (DESYREL) 50 MG tablet     triamcinolone (KENALOG) 0.1 % cream     UNABLE TO FIND     [DISCONTINUED] triamcinolone (KENALOG) 0.1 % cream     No current facility-administered medications for this visit.      B/P: 133/81, T: Data Unavailable, P: 82, R: 16    In general she looks actually quite well.  HEENT exam shows no scleral icterus or temporal muscle wasting.  Her chest is clear.  Her abdominal exam shows no increase in girth.  No masses or tenderness to palpation are present.  Her liver is 10 cm in span without left lobe enlargement.  No spleen tip is palpable.  Extremity exam shows no edema.  Skin exam shows no stigmata of chronic  liver disease.  Neurologic exam shows no asterixis.      Her most recent laboratory tests show her white count is 3.2, hemoglobin 14.2, platelets 135,000.  INR is 1.12.  Sodium 143, potassium 3.7, BUN is 12, creatinine 0.8.  AST is 30, ALT is 38, alkaline phosphatase 72, albumin is 3.4 with total protein 7.2, total bilirubin is 0.6.        I did review her ultrasound which shows no mass lesions in her liver and no ascites.      IMPRESSION:  Ms. Chavarria is doing well.  This is about as good as she has looked for a liver disease standpoint, and certainly can describe to long-term abstinence from alcohol.  I, otherwise, will not make any other change to her medical regimen.  I will see her back in the clinic in 6 months for repeat ultrasound and blood work.  She is up-to-date with regard to vaccines and other cancer screening.  Unfortunately, she does have a lesion in her breast that she is going to need a biopsy.      Thank you very much for allowing me to participate in the care of your patient.  If you have any questions regarding my recommendations, please do not hesitate to contact me.       José Antonio Reese MD      Professor of Medicine  [facility] [employer]      Executive Medical Director, Solid Organ Transplant Program  [facility]

## 2018-11-07 NOTE — PROGRESS NOTES
Chief Complaint   Patient presents with     RECHECK     Hep C       /81 (BP Location: Right arm, Patient Position: Sitting, Cuff Size: Adult Regular)  Pulse 82  Resp 16  Ht 1.524 m (5')  Wt 79.9 kg (176 lb 3.2 oz)  SpO2 95%  BMI 34.41 kg/m2    Yesenia Lamb [facility]  11/7/2018 2:57 PM

## 2018-11-07 NOTE — LETTER
11/7/2018      RE: Romina Chavarria  [address] [place] [address]       Chief Complaint   Patient presents with     RECHECK     Hep C       /81 (BP Location: Right arm, Patient Position: Sitting, Cuff Size: Adult Regular)  Pulse 82  Resp 16  Ht 1.524 m (5')  Wt 79.9 kg (176 lb 3.2 oz)  SpO2 95%  BMI 34.41 kg/m2    Yesenia Lamb [facility]  11/7/2018 2:57 PM        I had the pleasure of seeing Romina Chavarria for followup in the Liver Clinic at the [facility] on 11/07/2018.  Ms. Chavarria returns for followup of alcoholic cirrhosis.      She is doing well at this visit.  She denies any abdominal pain.  She does complain of some itching, but it is around her pannus.  She does have a moderate amount of fatigue.  She denies any increased abdominal girth or lower extremity edema.  She denies any fevers or chills, cough or shortness of breath.  She denies any nausea or vomiting, diarrhea or constipation.  Her appetite has been good.  Her weight unfortunately is unchanged.  She does need to lose some weight.  She has not had any gastrointestinal bleeding or any overt signs of hepatic encephalopathy, and there have not been any other new events since she was last seen.       Current Outpatient Prescriptions   Medication     Coenzyme Q10 (CO Q 10 PO)     ferrous gluconate (FERGON) 324 (38 Fe) MG tablet     furosemide (LASIX) 40 MG tablet     hydrocortisone (CORTAID) 1 % cream     hydrOXYzine (ATARAX) 25 MG tablet     KLOR-CON 20 MEQ CR tablet     loratadine (CLARITIN) 10 MG tablet     Omega-3 Fatty Acids (FISH OIL TRIPLE STRENGTH) 1400 MG CAPS     sertraline (ZOLOFT) 100 MG tablet     spironolactone (ALDACTONE) 50 MG tablet     traZODone (DESYREL) 50 MG tablet     triamcinolone (KENALOG) 0.1 % cream     UNABLE TO FIND     [DISCONTINUED] triamcinolone (KENALOG) 0.1 % cream     No current facility-administered medications for this visit.      B/P: 133/81, T: Data Unavailable, P: 82, R: 16    In  general she looks actually quite well.  HEENT exam shows no scleral icterus or temporal muscle wasting.  Her chest is clear.  Her abdominal exam shows no increase in girth.  No masses or tenderness to palpation are present.  Her liver is 10 cm in span without left lobe enlargement.  No spleen tip is palpable.  Extremity exam shows no edema.  Skin exam shows no stigmata of chronic liver disease.  Neurologic exam shows no asterixis.      Her most recent laboratory tests show her white count is 3.2, hemoglobin 14.2, platelets 135,000.  INR is 1.12.  Sodium 143, potassium 3.7, BUN is 12, creatinine 0.8.  AST is 30, ALT is 38, alkaline phosphatase 72, albumin is 3.4 with total protein 7.2, total bilirubin is 0.6.        I did review her ultrasound which shows no mass lesions in her liver and no ascites.      IMPRESSION:  Ms. Chavarria is doing well.  This is about as good as she has looked for a liver disease standpoint, and certainly can describe to long-term abstinence from alcohol.  I, otherwise, will not make any other change to her medical regimen.  I will see her back in the clinic in 6 months for repeat ultrasound and blood work.  She is up-to-date with regard to vaccines and other cancer screening.  Unfortunately, she does have a lesion in her breast that she is going to need a biopsy.      Thank you very much for allowing me to participate in the care of your patient.  If you have any questions regarding my recommendations, please do not hesitate to contact me.           José Antonio Reese MD

## 2018-11-07 NOTE — LETTER
11/7/2018       RE: Romina Chavarria  [address] [place] [address]     Dear Colleague,    Thank you for referring your patient, Romina Chavarria, to the [facility]PATOLOGY at [facility]. Please see a copy of my visit note below.    Chief Complaint   Patient presents with     RECHECK     Hep C       /81 (BP Location: Right arm, Patient Position: Sitting, Cuff Size: Adult Regular)  Pulse 82  Resp 16  Ht 1.524 m (5')  Wt 79.9 kg (176 lb 3.2 oz)  SpO2 95%  BMI 34.41 kg/m2    Yeseniaalice DAVIDSON Zainab [facility]  11/7/2018 2:57 PM        I had the pleasure of seeing Romina Chavarria for followup in the Liver Clinic at the [facility] on 11/07/2018.  Ms. Chavarria returns for followup of alcoholic cirrhosis.      She is doing well at this visit.  She denies any abdominal pain.  She does complain of some itching, but it is around her pannus.  She does have a moderate amount of fatigue.  She denies any increased abdominal girth or lower extremity edema.  She denies any fevers or chills, cough or shortness of breath.  She denies any nausea or vomiting, diarrhea or constipation.  Her appetite has been good.  Her weight unfortunately is unchanged.  She does need to lose some weight.  She has not had any gastrointestinal bleeding or any overt signs of hepatic encephalopathy, and there have not been any other new events since she was last seen.       Current Outpatient Prescriptions   Medication     Coenzyme Q10 (CO Q 10 PO)     ferrous gluconate (FERGON) 324 (38 Fe) MG tablet     furosemide (LASIX) 40 MG tablet     hydrocortisone (CORTAID) 1 % cream     hydrOXYzine (ATARAX) 25 MG tablet     KLOR-CON 20 MEQ CR tablet     loratadine (CLARITIN) 10 MG tablet     Omega-3 Fatty Acids (FISH OIL TRIPLE STRENGTH) 1400 MG CAPS     sertraline (ZOLOFT) 100 MG tablet     spironolactone (ALDACTONE) 50 MG tablet     traZODone (DESYREL) 50 MG tablet     triamcinolone (KENALOG) 0.1 % cream      UNABLE TO FIND     [DISCONTINUED] triamcinolone (KENALOG) 0.1 % cream     No current facility-administered medications for this visit.      B/P: 133/81, T: Data Unavailable, P: 82, R: 16    In general she looks actually quite well.  HEENT exam shows no scleral icterus or temporal muscle wasting.  Her chest is clear.  Her abdominal exam shows no increase in girth.  No masses or tenderness to palpation are present.  Her liver is 10 cm in span without left lobe enlargement.  No spleen tip is palpable.  Extremity exam shows no edema.  Skin exam shows no stigmata of chronic liver disease.  Neurologic exam shows no asterixis.      Her most recent laboratory tests show her white count is 3.2, hemoglobin 14.2, platelets 135,000.  INR is 1.12.  Sodium 143, potassium 3.7, BUN is 12, creatinine 0.8.  AST is 30, ALT is 38, alkaline phosphatase 72, albumin is 3.4 with total protein 7.2, total bilirubin is 0.6.        I did review her ultrasound which shows no mass lesions in her liver and no ascites.      IMPRESSION:  Ms. Chavarria is doing well.  This is about as good as she has looked for a liver disease standpoint, and certainly can describe to long-term abstinence from alcohol.  I, otherwise, will not make any other change to her medical regimen.  I will see her back in the clinic in 6 months for repeat ultrasound and blood work.  She is up-to-date with regard to vaccines and other cancer screening.  Unfortunately, she does have a lesion in her breast that she is going to need a biopsy.      Thank you very much for allowing me to participate in the care of your patient.  If you have any questions regarding my recommendations, please do not hesitate to contact me.           Again, thank you for allowing me to participate in the care of your patient.      Sincerely,    José Antonio Reese MD

## 2018-11-07 NOTE — MR AVS SNAPSHOT
After Visit Summary   11/7/2018    Romina Chavarria    MRN: [id number]           Patient Information     Date Of Birth          1945        Visit Information        Provider Department      11/7/2018 2:15 PM José Antonio Reese MD [facility] Hepatology        Today's Diagnoses     Alcoholic cirrhosis of liver without ascites (H)    -  1       Follow-ups after your visit        Follow-up notes from your care team     Return in about 6 months (around 5/7/2019).      Your next 10 appointments already scheduled     Nov 14, 2018 11:30 AM CST   US BREAST ASPIRATION CYST INITIAL RIGHT with PHUS1, PH RAD   [facility] Ultrasound ([facility])    [address] [address] [phone]           How do I prepare for my exam? (Food and drink instructions) No Food and Drink Restrictions.  How do I prepare for my exam? (Other instructions) IF YOUR DOCTOR ALSO PRESCRIBED SEDATION DURING THE EXAM (medicine to help you relax): You will receive separate instructions about driving, eating, and additional tests that may be necessary prior to your exam day.  What should I wear: Wear comfortable clothes.  How long does the exam take: Aspiration (no sedation treatment takes about an hour.  For paracentesis, thoracentesis or sedation plan to spend at least three hours at the hospital.  What should I bring: Bring a list of your medicines, including vitamins, minerals and over-the-counter drugs. It is safest to leave personal items at home.  Do I need a :  No  is needed.  What do I need to tell my doctor: Tell your doctor in advance: * If you are or may be pregnant. * If you are taking Coumadin (or any other blood thinners) 5 days prior to the exam for any special instructions. * If you are diabetic to determine if your insulin needs have to be adjusted for the exam.  What should I do after the exam: Take it easy the rest of the day. You can return to normal activities the next  day.  What is this test: This test uses a long, thin needle or tube to remove tissue, fluid, or other cells from your body. Pictures from an ultrasound will guide the needle to the right place. (Ultrasound uses sound waves to create pictures of the body on a video screen. You will not feel the sound waves.)  * A biopsy removes tissue or other cells from the body; we send the tissue or cells to a lab for testing. * Paracentesis removes fluid from the belly (abdomen) to relieve pressure, to test the fluid or both. * Thoracentesis removes fluid from the sac around the lungs to relieve pressure, to test the fluid or both. * Aspiration removes fluid from any part of the body, then the fluid is tested for disease or infection.  Who should I call with questions: If you have any questions, please call the Imaging Department where you will have your exam. Directions, parking instructions, and other information is available on our website, [email or web address].            Nov 14, 2018 12:30 PM CST   MA POST PROCEDURE RIGHT with PHMA1, PH RAD   [facility] Imaging ([facility])    [address] [place] [address] [phone]           How do I prepare for my exam? (Food and drink instructions) No Food and Drink Restrictions.  How do I prepare for my exam? (Other instructions) Do not use any powder, lotion or deodorant under your arms or on your breast. If you do, we will ask you to remove it before your exam.  What should I wear: Wear comfortable, two-piece clothing.  How long does the exam take: Most scans will take 15 minutes.  What should I bring: Bring any previous mammograms from other facilities or have them mailed to the breast center.  Do I need a :  No  is needed.  What do I need to tell my doctor: If you have any allergies, tell your care team.  What should I do after the exam: No restrictions, You may resume normal activities.  What is this test: This test is an  "x-ray of the breast to look for breast disease. The breast is pressed between two plates to flatten and spread the tissue. An X-ray is taken of the breast from different angles.  Who should I call with questions: If you have any questions, please call the Imaging Department where you will have your exam. Directions, parking instructions, and other information is available on our website, [email or web address].  Other information about my exam Three-dimensional (3D) mammograms are available at [place] locations in [facility], [place], and [place]. [facility] locations include [place] and [facility] [place] in [place].  Benefits of 3D mammograms include: * Improved rate of cancer detection * Decreases your chance of having to go back for more tests, which means fewer: * \"False-positive\" results (This means that there is an abnormal area but it isn't cancer.) * Invasive testing procedures, such as a biopsy or surgery * Can provide clearer images of the breast if you have dense breast tissue.  *3D mammography is an optional exam that anyone can have with a 2D mammogram. It doesn't replace or take the place of a 2D mammogram. 2D mammograms remain an effective screening test for all women.  Not all insurance companies cover the cost of a 3D mammogram. Check with your insurance.            May 07, 2019 10:45 AM CDT   (Arrive by 10:30 AM)   Return General Liver with José Antonio Reese MD   [facility] Hepatology ([facility] [place])    [address] [place] [address]  [address]  [facility] [address] [phone]              Future tests that were ordered for you today     Open Standing Orders        Priority Remaining Interval Expires Ordered    US abdomen complete [SZV620] Routine 2/2 Every 6 Months 11/7/2019 11/7/2018          Open Future Orders        Priority Expected Expires Ordered    Hepatic Panel [LAB20] Routine 5/6/2019 11/7/2019 11/7/2018    Basic " metabolic panel [LAB15] Routine 5/6/2019 11/7/2019 11/7/2018    CBC with platelets [VTC608] Routine 5/6/2019 11/7/2019 11/7/2018    INR [XOU2490] Routine 5/6/2019 11/7/2019 11/7/2018    AFP tumor marker [FFY178] Routine 5/6/2019 11/7/2019 11/7/2018    MA Post Procedure Right Routine  11/6/2019 11/6/2018    US Breast Aspiration Cyst Initial Right Routine  11/6/2019 11/6/2018            Who to contact     If you have questions or need follow up information about today's clinic visit or your schedule please contact [facility] HEPATOLOGY directly at [phone].  Normal or non-critical lab and imaging results will be communicated to you by C[employer], letter or phone within 4 business days after the clinic has received the results. If you do not hear from us within 7 days, please contact the clinic through [employer] or phone. If you have a critical or abnormal lab result, we will notify you by phone as soon as possible.  Submit refill requests through [employer] or call your pharmacy and they will forward the refill request to us. Please allow 3 business days for your refill to be completed.          Additional Information About Your Visit        Nginx Information     Nginx gives you secure access to your electronic health record. If you see a primary care provider, you can also send messages to your care team and make appointments. If you have questions, please call your primary care clinic.  If you do not have a primary care provider, please call [phone] and they will assist you.        Care EveryWhere ID     This is your Care EveryWhere ID. This could be used by other organizations to access your [facility] medical records  [id number]         Blood Pressure from Last 3 Encounters:   05/16/18 136/78   05/09/18 128/78   02/13/18 124/82    Weight from Last 3 Encounters:   05/16/18 82.1 kg (181 lb)   05/09/18 83.3 kg (183 lb 9.6 oz)   02/13/18 82.6 kg (182 lb)              We Performed the Following     Schedule follow  up appointments          Today's Medication Changes          These changes are accurate as of 11/7/18  2:53 PM.  If you have any questions, ask your nurse or doctor.               These medicines have changed or have updated prescriptions.        Dose/Directions    furosemide 40 MG tablet   Commonly known as:  LASIX   This may have changed:  when to take this   Used for:  Alcoholic cirrhosis of liver without ascites (H)        Dose:  40 mg   Take 1 tablet (40 mg) by mouth 2 times daily   Quantity:  180 tablet   Refills:  3       spironolactone 50 MG tablet   Commonly known as:  ALDACTONE   This may have changed:  when to take this   Used for:  Alcoholic cirrhosis of liver without ascites (H)        Dose:  50 mg   Take 1 tablet (50 mg) by mouth 2 times daily   Quantity:  180 tablet   Refills:  3                Primary Care Provider Office Phone # Fax #    Monique Ambriz [phone]       [address] [place] [address]        Equal Access to Services     [facility]: Hadii veronica Her, waaxda carmen, qaybta kaalmabharat story, jeremy pederson . So [facility] [phone].    ATENCIÓN: Si habla español, tiene a quintanilla disposición servicios gratuitos de asistencia lingüística. Llame al [phone].    We comply with applicable federal civil rights laws and [place] laws. We do not discriminate on the basis of race, color, national origin, age, disability, sex, sexual orientation, or gender identity.            Thank you!     Thank you for choosing [facility] HEPATOLOGY  for your care. Our goal is always to provide you with excellent care. Hearing back from our patients is one way we can continue to improve our services. Please take a few minutes to complete the written survey that you may receive in the mail after your visit with us. Thank you!             Your Updated Medication List - Protect others around you: Learn how to safely use, store and throw away your  medicines at www.disposemymeds.org.          This list is accurate as of 11/7/18  2:53 PM.  Always use your most recent med list.                   Brand Name Dispense Instructions for use Diagnosis    CO Q 10 PO      Take 1 capsule by mouth daily        ferrous gluconate 324 (38 Fe) MG tablet    FERGON    100 tablet    Take 1 tablet (324 mg) by mouth 3 times daily (with meals)    Gastrointestinal hemorrhage, unspecified gastrointestinal hemorrhage type       FISH OIL TRIPLE STRENGTH 1400 MG Caps      Take 1 capsule by mouth once daily. Indications: CARDIAC DISEASE        furosemide 40 MG tablet    LASIX    180 tablet    Take 1 tablet (40 mg) by mouth 2 times daily    Alcoholic cirrhosis of liver without ascites (H)       hydrocortisone 1 % cream    CORTAID    30 g    Apply sparingly to your face twice daily for 14 days.    Dermatitis       hydrOXYzine 25 MG tablet    ATARAX    60 tablet    Take 1-2 tablets (25-50 mg) by mouth every 6 hours as needed for itching Do not take while driving or operating machinery due to sedation.    Dermatitis       KLOR-CON 20 MEQ CR tablet   Generic drug:  potassium chloride SA     180 tablet    TAKE 1 TABLET TWICE DAILY    Hypokalemia       loratadine 10 MG tablet    CLARITIN     Take 1 tablet by mouth daily.        sertraline 100 MG tablet    ZOLOFT    90 tablet    Take 1 tablet (100 mg) by mouth daily    Anxiety       spironolactone 50 MG tablet    ALDACTONE    180 tablet    Take 1 tablet (50 mg) by mouth 2 times daily    Alcoholic cirrhosis of liver without ascites (H)       traZODone 50 MG tablet    DESYREL    90 tablet    Take 1 tablet (50 mg) by mouth At Bedtime    Anxiety       triamcinolone 0.1 % cream    KENALOG    60 g    Apply topically 2 times daily    Venous stasis dermatitis, unspecified laterality       UNABLE TO FIND      MEDICATION NAME: Liver Dane - [employer] by Kevon Martínez [employer] Milk Thistle, Artichoke, Tumeric

## 2018-11-14 ENCOUNTER — HOSPITAL ENCOUNTER (OUTPATIENT)
Dept: MAMMOGRAPHY | Facility: CLINIC | Age: 73
End: 2018-11-14
Attending: INTERNAL MEDICINE
Payer: MEDICARE

## 2018-11-14 ENCOUNTER — HOSPITAL ENCOUNTER (OUTPATIENT)
Dept: ULTRASOUND IMAGING | Facility: CLINIC | Age: 73
Discharge: HOME | End: 2018-11-14
Attending: INTERNAL MEDICINE | Admitting: INTERNAL MEDICINE
Payer: MEDICARE

## 2018-11-14 DIAGNOSIS — N63.0 BREAST NODULE: ICD-10-CM

## 2018-11-14 PROCEDURE — 25000125 ZZHC RX 250: Performed by: INTERNAL MEDICINE

## 2018-11-14 PROCEDURE — 27211116 US BREAST BIOPSY CORE NEEDLE RIGHT

## 2018-11-14 PROCEDURE — 19000 PUNCTURE ASPIR CYST BREAST: CPT

## 2018-11-14 PROCEDURE — 40000986 MA POST PROCEDURE RIGHT

## 2018-11-14 PROCEDURE — 88305 TISSUE EXAM BY PATHOLOGIST: CPT | Mod: 26 | Performed by: INTERNAL MEDICINE

## 2018-11-14 PROCEDURE — 88305 TISSUE EXAM BY PATHOLOGIST: CPT | Performed by: INTERNAL MEDICINE

## 2018-11-14 RX ADMIN — LIDOCAINE HYDROCHLORIDE 4 ML: 10 INJECTION, SOLUTION INFILTRATION; PERINEURAL at 11:43

## 2018-11-16 LAB — COPATH REPORT: NORMAL

## 2018-12-07 DIAGNOSIS — F41.9 ANXIETY: ICD-10-CM

## 2018-12-07 RX ORDER — SERTRALINE HYDROCHLORIDE 100 MG/1
TABLET, FILM COATED ORAL
Qty: 90 TABLET | Refills: 3 | Status: SHIPPED | OUTPATIENT
Start: 2018-12-07 | End: 2019-02-20

## 2018-12-07 RX ORDER — TRAZODONE HYDROCHLORIDE 50 MG/1
TABLET, FILM COATED ORAL
Qty: 90 TABLET | Refills: 3 | Status: SHIPPED | OUTPATIENT
Start: 2018-12-07 | End: 2019-02-20

## 2019-02-20 ENCOUNTER — TELEPHONE (OUTPATIENT)
Dept: GASTROENTEROLOGY | Facility: CLINIC | Age: 74
End: 2019-02-20

## 2019-02-20 DIAGNOSIS — K70.30 ALCOHOLIC CIRRHOSIS OF LIVER WITHOUT ASCITES (H): ICD-10-CM

## 2019-02-20 DIAGNOSIS — E87.6 HYPOKALEMIA: ICD-10-CM

## 2019-02-20 DIAGNOSIS — K92.2 GASTROINTESTINAL HEMORRHAGE, UNSPECIFIED GASTROINTESTINAL HEMORRHAGE TYPE: ICD-10-CM

## 2019-02-20 DIAGNOSIS — F41.9 ANXIETY: ICD-10-CM

## 2019-02-20 RX ORDER — TRAZODONE HYDROCHLORIDE 50 MG/1
50 TABLET, FILM COATED ORAL AT BEDTIME
Qty: 90 TABLET | Refills: 3 | Status: SHIPPED | OUTPATIENT
Start: 2019-02-20 | End: 2020-01-01

## 2019-02-20 RX ORDER — FUROSEMIDE 40 MG
40 TABLET ORAL DAILY
Qty: 90 TABLET | Refills: 3 | Status: SHIPPED | OUTPATIENT
Start: 2019-02-20 | End: 2020-01-01

## 2019-02-20 RX ORDER — POTASSIUM CHLORIDE 1500 MG/1
20 TABLET, EXTENDED RELEASE ORAL 2 TIMES DAILY
Qty: 180 TABLET | Refills: 3 | Status: SHIPPED | OUTPATIENT
Start: 2019-02-20 | End: 2020-01-01

## 2019-02-20 RX ORDER — SERTRALINE HYDROCHLORIDE 100 MG/1
100 TABLET, FILM COATED ORAL DAILY
Qty: 90 TABLET | Refills: 3 | Status: SHIPPED | OUTPATIENT
Start: 2019-02-20 | End: 2020-01-01

## 2019-02-20 RX ORDER — SPIRONOLACTONE 50 MG/1
50 TABLET, FILM COATED ORAL DAILY
Qty: 90 TABLET | Refills: 3 | Status: SHIPPED | OUTPATIENT
Start: 2019-02-20 | End: 2020-01-01

## 2019-02-20 RX ORDER — FERROUS GLUCONATE 324(38)MG
324 TABLET ORAL 2 TIMES DAILY WITH MEALS
Qty: 100 TABLET | Refills: 10 | Status: SHIPPED | OUTPATIENT
Start: 2019-02-20 | End: 2020-01-01

## 2019-02-20 NOTE — TELEPHONE ENCOUNTER
Medications ordered by Dr. Reese refilled using new requested pharmacy.  Patient updated.    Delores Rico LPN      -------   Health Call Center    Phone Message    May a detailed message be left on voicemail: yes    Reason for Call: Other: Pt:  Pt would like to have her Rx's renewed by Dr. Reese and sent to [facility] - Pt no longer has [facility], it is now [facility] Phone: [phone] / Fax: [phone]. Further questions please call pt thanks     Action Taken: Message routed to:  [facility] ([facility] HEP

## 2019-04-24 ENCOUNTER — TELEPHONE (OUTPATIENT)
Dept: GASTROENTEROLOGY | Facility: CLINIC | Age: 74
End: 2019-04-24

## 2019-07-02 DIAGNOSIS — K70.30 ALCOHOLIC CIRRHOSIS OF LIVER WITHOUT ASCITES (H): Primary | ICD-10-CM

## 2019-09-28 ENCOUNTER — HEALTH MAINTENANCE LETTER (OUTPATIENT)
Age: 74
End: 2019-09-28

## 2020-01-01 ENCOUNTER — VIRTUAL VISIT (OUTPATIENT)
Dept: ENDOCRINOLOGY | Facility: CLINIC | Age: 75
End: 2020-01-01
Attending: NURSE PRACTITIONER
Payer: MEDICARE

## 2020-01-01 ENCOUNTER — TELEPHONE (OUTPATIENT)
Dept: FAMILY MEDICINE | Facility: OTHER | Age: 75
End: 2020-01-01

## 2020-01-01 ENCOUNTER — PATIENT OUTREACH (OUTPATIENT)
Dept: CARE COORDINATION | Facility: CLINIC | Age: 75
End: 2020-01-01

## 2020-01-01 ENCOUNTER — MYC MEDICAL ADVICE (OUTPATIENT)
Dept: FAMILY MEDICINE | Facility: OTHER | Age: 75
End: 2020-01-01

## 2020-01-01 ENCOUNTER — MYC REFILL (OUTPATIENT)
Dept: GASTROENTEROLOGY | Facility: CLINIC | Age: 75
End: 2020-01-01

## 2020-01-01 ENCOUNTER — VIRTUAL VISIT (OUTPATIENT)
Dept: FAMILY MEDICINE | Facility: OTHER | Age: 75
End: 2020-01-01
Payer: MEDICARE

## 2020-01-01 ENCOUNTER — MYC REFILL (OUTPATIENT)
Dept: FAMILY MEDICINE | Facility: OTHER | Age: 75
End: 2020-01-01

## 2020-01-01 ENCOUNTER — MYC REFILL (OUTPATIENT)
Dept: ENDOCRINOLOGY | Facility: CLINIC | Age: 75
End: 2020-01-01

## 2020-01-01 ENCOUNTER — HOSPITAL ENCOUNTER (OUTPATIENT)
Dept: BONE DENSITY | Facility: CLINIC | Age: 75
End: 2020-07-08
Attending: FAMILY MEDICINE
Payer: MEDICARE

## 2020-01-01 ENCOUNTER — OFFICE VISIT (OUTPATIENT)
Dept: FAMILY MEDICINE | Facility: OTHER | Age: 75
End: 2020-01-01
Payer: MEDICARE

## 2020-01-01 ENCOUNTER — TELEPHONE (OUTPATIENT)
Dept: ENDOCRINOLOGY | Facility: CLINIC | Age: 75
End: 2020-01-01

## 2020-01-01 ENCOUNTER — MYC MEDICAL ADVICE (OUTPATIENT)
Dept: EDUCATION SERVICES | Facility: CLINIC | Age: 75
End: 2020-01-01

## 2020-01-01 ENCOUNTER — VIRTUAL VISIT (OUTPATIENT)
Dept: ENDOCRINOLOGY | Facility: CLINIC | Age: 75
End: 2020-01-01
Payer: MEDICARE

## 2020-01-01 ENCOUNTER — TRANSFERRED RECORDS (OUTPATIENT)
Dept: HEALTH INFORMATION MANAGEMENT | Facility: CLINIC | Age: 75
End: 2020-01-01

## 2020-01-01 ENCOUNTER — ALLIED HEALTH/NURSE VISIT (OUTPATIENT)
Dept: FAMILY MEDICINE | Facility: OTHER | Age: 75
End: 2020-01-01

## 2020-01-01 ENCOUNTER — TELEPHONE (OUTPATIENT)
Dept: GASTROENTEROLOGY | Facility: CLINIC | Age: 75
End: 2020-01-01

## 2020-01-01 ENCOUNTER — ALLIED HEALTH/NURSE VISIT (OUTPATIENT)
Dept: EDUCATION SERVICES | Facility: CLINIC | Age: 75
End: 2020-01-01
Payer: MEDICARE

## 2020-01-01 ENCOUNTER — PATIENT OUTREACH (OUTPATIENT)
Dept: EDUCATION SERVICES | Facility: CLINIC | Age: 75
End: 2020-01-01

## 2020-01-01 ENCOUNTER — VIRTUAL VISIT (OUTPATIENT)
Dept: GASTROENTEROLOGY | Facility: CLINIC | Age: 75
End: 2020-01-01
Attending: INTERNAL MEDICINE
Payer: MEDICARE

## 2020-01-01 ENCOUNTER — HOSPITAL ENCOUNTER (OUTPATIENT)
Dept: MAMMOGRAPHY | Facility: CLINIC | Age: 75
End: 2020-07-08
Attending: FAMILY MEDICINE
Payer: MEDICARE

## 2020-01-01 ENCOUNTER — HEALTH MAINTENANCE LETTER (OUTPATIENT)
Age: 75
End: 2020-01-01

## 2020-01-01 ENCOUNTER — NURSE TRIAGE (OUTPATIENT)
Dept: NURSING | Facility: CLINIC | Age: 75
End: 2020-01-01

## 2020-01-01 ENCOUNTER — TELEPHONE (OUTPATIENT)
Dept: EDUCATION SERVICES | Facility: CLINIC | Age: 75
End: 2020-01-01

## 2020-01-01 ENCOUNTER — HOSPITAL ENCOUNTER (OUTPATIENT)
Facility: CLINIC | Age: 75
Setting detail: SPECIMEN
Discharge: HOME | End: 2020-05-19
Admitting: NURSE PRACTITIONER
Payer: MEDICARE

## 2020-01-01 ENCOUNTER — MYC MEDICAL ADVICE (OUTPATIENT)
Dept: ENDOCRINOLOGY | Facility: CLINIC | Age: 75
End: 2020-01-01

## 2020-01-01 ENCOUNTER — OFFICE VISIT (OUTPATIENT)
Dept: ORTHOPEDICS | Facility: CLINIC | Age: 75
End: 2020-01-01
Payer: MEDICARE

## 2020-01-01 ENCOUNTER — DOCUMENTATION ONLY (OUTPATIENT)
Dept: CARE COORDINATION | Facility: CLINIC | Age: 75
End: 2020-01-01

## 2020-01-01 VITALS
SYSTOLIC BLOOD PRESSURE: 120 MMHG | OXYGEN SATURATION: 97 % | TEMPERATURE: 98.4 F | DIASTOLIC BLOOD PRESSURE: 70 MMHG | WEIGHT: 132.25 LBS | HEART RATE: 90 BPM | BODY MASS INDEX: 27.64 KG/M2 | RESPIRATION RATE: 14 BRPM

## 2020-01-01 VITALS
BODY MASS INDEX: 27.64 KG/M2 | DIASTOLIC BLOOD PRESSURE: 72 MMHG | HEART RATE: 78 BPM | WEIGHT: 132.25 LBS | SYSTOLIC BLOOD PRESSURE: 126 MMHG

## 2020-01-01 VITALS — OXYGEN SATURATION: 98 % | HEART RATE: 76 BPM | SYSTOLIC BLOOD PRESSURE: 127 MMHG | DIASTOLIC BLOOD PRESSURE: 89 MMHG

## 2020-01-01 VITALS — BODY MASS INDEX: 30.23 KG/M2 | HEIGHT: 58 IN | WEIGHT: 144 LBS

## 2020-01-01 DIAGNOSIS — F10.21 ALCOHOL DEPENDENCE IN REMISSION (H): ICD-10-CM

## 2020-01-01 DIAGNOSIS — K70.30 ALCOHOLIC CIRRHOSIS OF LIVER WITHOUT ASCITES (H): Primary | ICD-10-CM

## 2020-01-01 DIAGNOSIS — Z91.81 RISK FOR FALLS: ICD-10-CM

## 2020-01-01 DIAGNOSIS — E83.50 UNSPECIFIED DISORDER OF CALCIUM METABOLISM: ICD-10-CM

## 2020-01-01 DIAGNOSIS — L30.9 DERMATITIS: ICD-10-CM

## 2020-01-01 DIAGNOSIS — K70.30 ALCOHOLIC CIRRHOSIS OF LIVER WITHOUT ASCITES (H): ICD-10-CM

## 2020-01-01 DIAGNOSIS — R73.09 ELEVATED GLUCOSE LEVEL: ICD-10-CM

## 2020-01-01 DIAGNOSIS — B18.2 CHRONIC HEPATITIS C WITHOUT HEPATIC COMA (H): ICD-10-CM

## 2020-01-01 DIAGNOSIS — M94.9 DISORDER OF BONE AND CARTILAGE: ICD-10-CM

## 2020-01-01 DIAGNOSIS — E11.65 TYPE 2 DIABETES MELLITUS WITH HYPERGLYCEMIA, WITHOUT LONG-TERM CURRENT USE OF INSULIN (H): ICD-10-CM

## 2020-01-01 DIAGNOSIS — F41.9 ANXIETY: ICD-10-CM

## 2020-01-01 DIAGNOSIS — R63.4 WEIGHT LOSS: ICD-10-CM

## 2020-01-01 DIAGNOSIS — F10.10 ALCOHOL ABUSE: ICD-10-CM

## 2020-01-01 DIAGNOSIS — E11.65 TYPE 2 DIABETES MELLITUS WITH HYPERGLYCEMIA, WITHOUT LONG-TERM CURRENT USE OF INSULIN (H): Primary | ICD-10-CM

## 2020-01-01 DIAGNOSIS — F33.2 SEVERE EPISODE OF RECURRENT MAJOR DEPRESSIVE DISORDER, WITHOUT PSYCHOTIC FEATURES (H): ICD-10-CM

## 2020-01-01 DIAGNOSIS — E78.5 HYPERLIPIDEMIA WITH TARGET LDL LESS THAN 160: ICD-10-CM

## 2020-01-01 DIAGNOSIS — M89.9 DISORDER OF BONE AND CARTILAGE: ICD-10-CM

## 2020-01-01 DIAGNOSIS — K92.2 GASTROINTESTINAL HEMORRHAGE, UNSPECIFIED GASTROINTESTINAL HEMORRHAGE TYPE: ICD-10-CM

## 2020-01-01 DIAGNOSIS — Z91.199 COMPLIANCE POOR: ICD-10-CM

## 2020-01-01 DIAGNOSIS — E87.6 HYPOKALEMIA: ICD-10-CM

## 2020-01-01 DIAGNOSIS — Z13.29 SCREENING FOR HYPOTHYROIDISM: ICD-10-CM

## 2020-01-01 DIAGNOSIS — E66.01 MORBID OBESITY (H): ICD-10-CM

## 2020-01-01 DIAGNOSIS — E11.65 TYPE 2 DIABETES MELLITUS WITH HYPERGLYCEMIA, UNSPECIFIED WHETHER LONG TERM INSULIN USE (H): ICD-10-CM

## 2020-01-01 DIAGNOSIS — Z78.0 MENOPAUSE: ICD-10-CM

## 2020-01-01 DIAGNOSIS — Z00.00 ENCOUNTER FOR MEDICARE ANNUAL WELLNESS EXAM: Primary | ICD-10-CM

## 2020-01-01 DIAGNOSIS — D69.6 THROMBOCYTOPENIA (H): ICD-10-CM

## 2020-01-01 DIAGNOSIS — Z01.30 BP CHECK: Primary | ICD-10-CM

## 2020-01-01 DIAGNOSIS — E11.9 TYPE 2 DIABETES MELLITUS WITHOUT COMPLICATION, WITHOUT LONG-TERM CURRENT USE OF INSULIN (H): ICD-10-CM

## 2020-01-01 DIAGNOSIS — S46.211A BICEPS TENDON RUPTURE, PROXIMAL, RIGHT, INITIAL ENCOUNTER: ICD-10-CM

## 2020-01-01 DIAGNOSIS — R73.09 ELEVATED GLUCOSE: ICD-10-CM

## 2020-01-01 DIAGNOSIS — F41.1 GAD (GENERALIZED ANXIETY DISORDER): ICD-10-CM

## 2020-01-01 DIAGNOSIS — I87.2 VENOUS STASIS DERMATITIS, UNSPECIFIED LATERALITY: ICD-10-CM

## 2020-01-01 DIAGNOSIS — Z12.31 SCREENING MAMMOGRAM FOR HIGH-RISK PATIENT: ICD-10-CM

## 2020-01-01 DIAGNOSIS — F10.21 ALCOHOL DEPENDENCE IN REMISSION (H): Primary | ICD-10-CM

## 2020-01-01 DIAGNOSIS — K86.89 PANCREATIC MASS: ICD-10-CM

## 2020-01-01 DIAGNOSIS — R73.09 ELEVATED GLUCOSE LEVEL: Primary | ICD-10-CM

## 2020-01-01 DIAGNOSIS — E11.9 TYPE 2 DIABETES MELLITUS WITHOUT COMPLICATION, WITHOUT LONG-TERM CURRENT USE OF INSULIN (H): Primary | ICD-10-CM

## 2020-01-01 DIAGNOSIS — S49.91XA INJURY OF RIGHT UPPER ARM, INITIAL ENCOUNTER: Primary | ICD-10-CM

## 2020-01-01 DIAGNOSIS — R73.09 ELEVATED GLUCOSE: Primary | ICD-10-CM

## 2020-01-01 DIAGNOSIS — R45.851 SUICIDE IDEATION: Primary | ICD-10-CM

## 2020-01-01 DIAGNOSIS — R68.89 FORGETFULNESS: ICD-10-CM

## 2020-01-01 LAB
ALBUMIN SERPL-MCNC: 3.1 G/DL (ref 3.4–5)
ALP SERPL-CCNC: 113 U/L (ref 40–150)
ALT SERPL W P-5'-P-CCNC: 28 U/L (ref 0–50)
AMYLASE SERPL-CCNC: 2 U/L (ref 30–110)
ANION GAP SERPL CALCULATED.3IONS-SCNC: 11 MMOL/L (ref 3–14)
AST SERPL W P-5'-P-CCNC: 44 U/L (ref 0–45)
BILIRUB DIRECT SERPL-MCNC: 0.4 MG/DL (ref 0–0.2)
BILIRUB SERPL-MCNC: 1.1 MG/DL (ref 0.2–1.3)
BUN SERPL-MCNC: 9 MG/DL (ref 7–30)
CALCIUM SERPL-MCNC: 8.5 MG/DL (ref 8.5–10.1)
CHLORIDE SERPL-SCNC: 94 MMOL/L (ref 94–109)
CHOLEST SERPL-MCNC: 144 MG/DL
CO2 SERPL-SCNC: 23 MMOL/L (ref 20–32)
CREAT SERPL-MCNC: 0.55 MG/DL (ref 0.52–1.04)
CREAT SERPL-MCNC: 0.75 MG/DL (ref 0.52–1.04)
CREAT UR-MCNC: 125 MG/DL
CREAT UR-MCNC: 24 MG/DL
ERYTHROCYTE [DISTWIDTH] IN BLOOD BY AUTOMATED COUNT: 13.9 % (ref 10–15)
GAD65 AB SER IA-ACNC: <5 IU/ML (ref 0–5)
GFR SERPL CREATININE-BSD FRML MDRD: 78 ML/MIN/{1.73_M2}
GFR SERPL CREATININE-BSD FRML MDRD: >90 ML/MIN/{1.73_M2}
GLUCOSE SERPL-MCNC: 189 MG/DL (ref 70–99)
GLUCOSE SERPL-MCNC: 513 MG/DL (ref 70–99)
GLUCOSE SERPL-MCNC: 636 MG/DL (ref 70–99)
HBA1C MFR BLD: 12.9 % (ref 0–5.6)
HCT VFR BLD AUTO: 40.4 % (ref 35–47)
HDLC SERPL-MCNC: 78 MG/DL
HGB BLD-MCNC: 13.8 G/DL (ref 11.7–15.7)
INR PPP: 1.21 (ref 0.86–1.14)
LDLC SERPL CALC-MCNC: 51 MG/DL
LIPASE SERPL-CCNC: 28 U/L (ref 73–393)
MCH RBC QN AUTO: 32.4 PG (ref 26.5–33)
MCHC RBC AUTO-ENTMCNC: 34.2 G/DL (ref 31.5–36.5)
MCV RBC AUTO: 95 FL (ref 78–100)
MICROALBUMIN UR-MCNC: 23 MG/L
MICROALBUMIN UR-MCNC: 25 MG/L
MICROALBUMIN/CREAT UR: 20.08 MG/G CR (ref 0–25)
MICROALBUMIN/CREAT UR: 94.65 MG/G CR (ref 0–25)
NONHDLC SERPL-MCNC: 66 MG/DL
PLATELET # BLD AUTO: 152 10E9/L (ref 150–450)
POTASSIUM SERPL-SCNC: 3.7 MMOL/L (ref 3.4–5.3)
PROT SERPL-MCNC: 6.8 G/DL (ref 6.8–8.8)
RBC # BLD AUTO: 4.26 10E12/L (ref 3.8–5.2)
SODIUM SERPL-SCNC: 128 MMOL/L (ref 133–144)
TRIGL SERPL-MCNC: 76 MG/DL
TSH SERPL DL<=0.005 MIU/L-ACNC: 3.98 MU/L (ref 0.4–4)
WBC # BLD AUTO: 5.3 10E9/L (ref 4–11)

## 2020-01-01 PROCEDURE — 82947 ASSAY GLUCOSE BLOOD QUANT: CPT | Performed by: NURSE PRACTITIONER

## 2020-01-01 PROCEDURE — 99442 ZZC PHYSICIAN TELEPHONE EVALUATION 11-20 MIN: CPT | Performed by: NURSE PRACTITIONER

## 2020-01-01 PROCEDURE — 83036 HEMOGLOBIN GLYCOSYLATED A1C: CPT | Performed by: NURSE PRACTITIONER

## 2020-01-01 PROCEDURE — 85610 PROTHROMBIN TIME: CPT | Performed by: INTERNAL MEDICINE

## 2020-01-01 PROCEDURE — 99442: CPT | Performed by: NURSE PRACTITIONER

## 2020-01-01 PROCEDURE — 77063 BREAST TOMOSYNTHESIS BI: CPT

## 2020-01-01 PROCEDURE — 99203 OFFICE O/P NEW LOW 30 MIN: CPT | Performed by: PHYSICAL MEDICINE & REHABILITATION

## 2020-01-01 PROCEDURE — 82150 ASSAY OF AMYLASE: CPT | Performed by: NURSE PRACTITIONER

## 2020-01-01 PROCEDURE — 36415 COLL VENOUS BLD VENIPUNCTURE: CPT | Performed by: INTERNAL MEDICINE

## 2020-01-01 PROCEDURE — 99207 ZZC NO CHARGE NURSE ONLY: CPT

## 2020-01-01 PROCEDURE — G0108 DIAB MANAGE TRN  PER INDIV: HCPCS

## 2020-01-01 PROCEDURE — 36415 COLL VENOUS BLD VENIPUNCTURE: CPT | Performed by: NURSE PRACTITIONER

## 2020-01-01 PROCEDURE — 77080 DXA BONE DENSITY AXIAL: CPT

## 2020-01-01 PROCEDURE — 85027 COMPLETE CBC AUTOMATED: CPT | Performed by: INTERNAL MEDICINE

## 2020-01-01 PROCEDURE — 80048 BASIC METABOLIC PNL TOTAL CA: CPT | Performed by: INTERNAL MEDICINE

## 2020-01-01 PROCEDURE — 99443 PR PHYSICIAN TELEPHONE EVALUATION 21-30 MIN: CPT | Mod: 95 | Performed by: INTERNAL MEDICINE

## 2020-01-01 PROCEDURE — 40000114 ZZH STATISTIC NO CHARGE CLINIC VISIT: Mod: TEL,ZF | Performed by: INTERNAL MEDICINE

## 2020-01-01 PROCEDURE — 99213 OFFICE O/P EST LOW 20 MIN: CPT | Performed by: PHYSICIAN ASSISTANT

## 2020-01-01 PROCEDURE — 82565 ASSAY OF CREATININE: CPT | Performed by: INTERNAL MEDICINE

## 2020-01-01 PROCEDURE — 84443 ASSAY THYROID STIM HORMONE: CPT | Performed by: NURSE PRACTITIONER

## 2020-01-01 PROCEDURE — 99443: CPT | Performed by: INTERNAL MEDICINE

## 2020-01-01 PROCEDURE — 99442 PR PHYSICIAN TELEPHONE EVALUATION 11-20 MIN: CPT | Mod: 95 | Performed by: NURSE PRACTITIONER

## 2020-01-01 PROCEDURE — 80061 LIPID PANEL: CPT | Performed by: NURSE PRACTITIONER

## 2020-01-01 PROCEDURE — 82043 UR ALBUMIN QUANTITATIVE: CPT | Performed by: INTERNAL MEDICINE

## 2020-01-01 PROCEDURE — 82947 ASSAY GLUCOSE BLOOD QUANT: CPT | Performed by: INTERNAL MEDICINE

## 2020-01-01 PROCEDURE — 83690 ASSAY OF LIPASE: CPT | Performed by: NURSE PRACTITIONER

## 2020-01-01 PROCEDURE — 99443 ZZC PHYSICIAN TELEPHONE EVALUATION 21-30 MIN: CPT | Performed by: INTERNAL MEDICINE

## 2020-01-01 PROCEDURE — 82043 UR ALBUMIN QUANTITATIVE: CPT | Performed by: NURSE PRACTITIONER

## 2020-01-01 PROCEDURE — 80076 HEPATIC FUNCTION PANEL: CPT | Performed by: INTERNAL MEDICINE

## 2020-01-01 PROCEDURE — G0439 PPPS, SUBSEQ VISIT: HCPCS | Mod: 95 | Performed by: NURSE PRACTITIONER

## 2020-01-01 RX ORDER — FERROUS GLUCONATE 324(38)MG
324 TABLET ORAL 2 TIMES DAILY WITH MEALS
Qty: 60 TABLET | Refills: 0 | Status: SHIPPED | OUTPATIENT
Start: 2020-01-01 | End: 2020-01-01

## 2020-01-01 RX ORDER — HYDROXYZINE HYDROCHLORIDE 25 MG/1
25-50 TABLET, FILM COATED ORAL EVERY 6 HOURS PRN
Qty: 60 TABLET | Refills: 0 | Status: SHIPPED | OUTPATIENT
Start: 2020-01-01

## 2020-01-01 RX ORDER — BENZOCAINE/MENTHOL 6 MG-10 MG
LOZENGE MUCOUS MEMBRANE
Qty: 30 G | Refills: 0 | OUTPATIENT
Start: 2020-01-01

## 2020-01-01 RX ORDER — TRAZODONE HYDROCHLORIDE 50 MG/1
50 TABLET, FILM COATED ORAL AT BEDTIME
Qty: 30 TABLET | Refills: 1 | Status: SHIPPED | OUTPATIENT
Start: 2020-01-01 | End: 2020-01-01

## 2020-01-01 RX ORDER — HYDROXYZINE HYDROCHLORIDE 25 MG/1
25-50 TABLET, FILM COATED ORAL EVERY 6 HOURS PRN
Qty: 60 TABLET | Refills: 4 | Status: CANCELLED | OUTPATIENT
Start: 2020-01-01

## 2020-01-01 RX ORDER — HYDROXYZINE HYDROCHLORIDE 25 MG/1
25-50 TABLET, FILM COATED ORAL EVERY 6 HOURS PRN
Qty: 60 TABLET | Refills: 4 | Status: SHIPPED | OUTPATIENT
Start: 2020-01-01 | End: 2020-01-01

## 2020-01-01 RX ORDER — SERTRALINE HYDROCHLORIDE 100 MG/1
100 TABLET, FILM COATED ORAL DAILY
Qty: 90 TABLET | Refills: 0 | Status: SHIPPED | OUTPATIENT
Start: 2020-01-01 | End: 2021-01-01

## 2020-01-01 RX ORDER — TRAZODONE HYDROCHLORIDE 50 MG/1
50 TABLET, FILM COATED ORAL AT BEDTIME
Qty: 90 TABLET | Refills: 0 | Status: SHIPPED | OUTPATIENT
Start: 2020-01-01 | End: 2020-01-01

## 2020-01-01 RX ORDER — SERTRALINE HYDROCHLORIDE 100 MG/1
100 TABLET, FILM COATED ORAL DAILY
Qty: 90 TABLET | Refills: 0 | OUTPATIENT
Start: 2020-01-01

## 2020-01-01 RX ORDER — SERTRALINE HYDROCHLORIDE 100 MG/1
100 TABLET, FILM COATED ORAL DAILY
Qty: 90 TABLET | Refills: 1 | Status: SHIPPED | OUTPATIENT
Start: 2020-01-01 | End: 2020-01-01

## 2020-01-01 RX ORDER — MIRTAZAPINE 7.5 MG/1
7.5 TABLET, FILM COATED ORAL AT BEDTIME
COMMUNITY

## 2020-01-01 RX ORDER — FERROUS GLUCONATE 324(38)MG
324 TABLET ORAL
Qty: 60 TABLET | Refills: 0 | Status: SHIPPED | OUTPATIENT
Start: 2020-01-01 | End: 2021-01-01

## 2020-01-01 RX ORDER — FUROSEMIDE 40 MG
40 TABLET ORAL DAILY
Qty: 90 TABLET | Refills: 3 | OUTPATIENT
Start: 2020-01-01

## 2020-01-01 RX ORDER — SPIRONOLACTONE 50 MG/1
50 TABLET, FILM COATED ORAL DAILY
Qty: 30 TABLET | Refills: 0 | Status: SHIPPED | OUTPATIENT
Start: 2020-01-01 | End: 2020-01-01

## 2020-01-01 RX ORDER — FUROSEMIDE 40 MG
40 TABLET ORAL DAILY
Qty: 90 TABLET | Refills: 3 | Status: SHIPPED | OUTPATIENT
Start: 2020-01-01 | End: 2020-01-01

## 2020-01-01 RX ORDER — TRAZODONE HYDROCHLORIDE 50 MG/1
50 TABLET, FILM COATED ORAL AT BEDTIME
Qty: 90 TABLET | Refills: 0 | Status: SHIPPED | OUTPATIENT
Start: 2020-01-01 | End: 2021-01-01

## 2020-01-01 RX ORDER — SERTRALINE HYDROCHLORIDE 100 MG/1
100 TABLET, FILM COATED ORAL DAILY
Qty: 30 TABLET | Refills: 0 | Status: SHIPPED | OUTPATIENT
Start: 2020-01-01 | End: 2020-01-01

## 2020-01-01 RX ORDER — SPIRONOLACTONE 50 MG/1
50 TABLET, FILM COATED ORAL DAILY
Qty: 90 TABLET | Refills: 0 | OUTPATIENT
Start: 2020-01-01

## 2020-01-01 RX ORDER — BLOOD SUGAR DIAGNOSTIC
STRIP MISCELLANEOUS
Refills: 0 | OUTPATIENT
Start: 2020-01-01

## 2020-01-01 RX ORDER — FERROUS GLUCONATE 324(38)MG
324 TABLET ORAL
Qty: 60 TABLET | Refills: 0
Start: 2020-01-01 | End: 2020-01-01

## 2020-01-01 RX ORDER — FUROSEMIDE 40 MG
40 TABLET ORAL DAILY
Qty: 30 TABLET | Refills: 0 | Status: SHIPPED | OUTPATIENT
Start: 2020-01-01 | End: 2020-01-01

## 2020-01-01 RX ORDER — TRAZODONE HYDROCHLORIDE 50 MG/1
50 TABLET, FILM COATED ORAL AT BEDTIME
Qty: 30 TABLET | Refills: 0 | Status: CANCELLED | OUTPATIENT
Start: 2020-01-01

## 2020-01-01 RX ORDER — SERTRALINE HYDROCHLORIDE 100 MG/1
100 TABLET, FILM COATED ORAL DAILY
Qty: 90 TABLET | Refills: 0 | Status: SHIPPED | OUTPATIENT
Start: 2020-01-01 | End: 2020-01-01

## 2020-01-01 RX ORDER — TRAZODONE HYDROCHLORIDE 50 MG/1
50 TABLET, FILM COATED ORAL AT BEDTIME
Qty: 30 TABLET | Refills: 1 | Status: CANCELLED | OUTPATIENT
Start: 2020-01-01

## 2020-01-01 RX ORDER — FUROSEMIDE 40 MG
40 TABLET ORAL DAILY
Qty: 90 TABLET | Refills: 0 | OUTPATIENT
Start: 2020-01-01

## 2020-01-01 RX ORDER — HYDROXYZINE HYDROCHLORIDE 25 MG/1
25-50 TABLET, FILM COATED ORAL EVERY 6 HOURS PRN
Qty: 60 TABLET | Refills: 0 | OUTPATIENT
Start: 2020-01-01

## 2020-01-01 RX ORDER — TRAZODONE HYDROCHLORIDE 50 MG/1
50 TABLET, FILM COATED ORAL AT BEDTIME
Qty: 90 TABLET | Refills: 1 | Status: SHIPPED | OUTPATIENT
Start: 2020-01-01 | End: 2020-01-01

## 2020-01-01 RX ORDER — TRAZODONE HYDROCHLORIDE 50 MG/1
50 TABLET, FILM COATED ORAL AT BEDTIME
Qty: 30 TABLET | Refills: 0 | Status: SHIPPED | OUTPATIENT
Start: 2020-01-01 | End: 2020-01-01

## 2020-01-01 RX ORDER — SPIRONOLACTONE 50 MG/1
50 TABLET, FILM COATED ORAL DAILY
Qty: 90 TABLET | Refills: 3 | Status: SHIPPED | OUTPATIENT
Start: 2020-01-01 | End: 2020-01-01

## 2020-01-01 RX ORDER — LANCETS
EACH MISCELLANEOUS
Qty: 102 EACH | Refills: 0 | OUTPATIENT
Start: 2020-01-01

## 2020-01-01 RX ORDER — HYDROXYZINE HYDROCHLORIDE 25 MG/1
25-50 TABLET, FILM COATED ORAL EVERY 6 HOURS PRN
Qty: 60 TABLET | Refills: 0 | Status: SHIPPED | OUTPATIENT
Start: 2020-01-01 | End: 2020-01-01

## 2020-01-01 RX ORDER — HYDROXYZINE HYDROCHLORIDE 25 MG/1
25-50 TABLET, FILM COATED ORAL EVERY 6 HOURS PRN
Qty: 60 TABLET | Refills: 1 | Status: SHIPPED | OUTPATIENT
Start: 2020-01-01 | End: 2020-01-01

## 2020-01-01 RX ORDER — POTASSIUM CHLORIDE 1500 MG/1
20 TABLET, EXTENDED RELEASE ORAL 2 TIMES DAILY
Qty: 60 TABLET | Refills: 0 | Status: SHIPPED | OUTPATIENT
Start: 2020-01-01 | End: 2020-01-01

## 2020-01-01 RX ORDER — POTASSIUM CHLORIDE 1500 MG/1
20 TABLET, EXTENDED RELEASE ORAL 2 TIMES DAILY
Qty: 60 TABLET | Refills: 0 | OUTPATIENT
Start: 2020-01-01

## 2020-01-01 RX ORDER — TRAZODONE HYDROCHLORIDE 50 MG/1
50 TABLET, FILM COATED ORAL AT BEDTIME
Qty: 90 TABLET | Refills: 0 | OUTPATIENT
Start: 2020-01-01

## 2020-01-01 RX ORDER — TRIAMCINOLONE ACETONIDE 1 MG/G
CREAM TOPICAL 2 TIMES DAILY
Qty: 60 G | Refills: 3 | OUTPATIENT
Start: 2020-01-01

## 2020-01-01 SDOH — ECONOMIC STABILITY: TRANSPORTATION INSECURITY
IN THE PAST 12 MONTHS, HAS THE LACK OF TRANSPORTATION KEPT YOU FROM MEDICAL APPOINTMENTS OR FROM GETTING MEDICATIONS?: NO

## 2020-01-01 SDOH — ECONOMIC STABILITY: FOOD INSECURITY: WITHIN THE PAST 12 MONTHS, YOU WORRIED THAT YOUR FOOD WOULD RUN OUT BEFORE YOU GOT MONEY TO BUY MORE.: NEVER TRUE

## 2020-01-01 SDOH — ECONOMIC STABILITY: INCOME INSECURITY: HOW HARD IS IT FOR YOU TO PAY FOR THE VERY BASICS LIKE FOOD, HOUSING, MEDICAL CARE, AND HEATING?: NOT HARD AT ALL

## 2020-01-01 SDOH — ECONOMIC STABILITY: TRANSPORTATION INSECURITY
IN THE PAST 12 MONTHS, HAS LACK OF TRANSPORTATION KEPT YOU FROM MEETINGS, WORK, OR FROM GETTING THINGS NEEDED FOR DAILY LIVING?: NO

## 2020-01-01 SDOH — ECONOMIC STABILITY: FOOD INSECURITY: WITHIN THE PAST 12 MONTHS, THE FOOD YOU BOUGHT JUST DIDN'T LAST AND YOU DIDN'T HAVE MONEY TO GET MORE.: NEVER TRUE

## 2020-01-01 ASSESSMENT — ENCOUNTER SYMPTOMS
MUSCULOSKELETAL NEGATIVE: 1
NEUROLOGICAL NEGATIVE: 1
CONSTITUTIONAL NEGATIVE: 1
RESPIRATORY NEGATIVE: 1
PARESTHESIAS: 0
CARDIOVASCULAR NEGATIVE: 1
PSYCHIATRIC NEGATIVE: 1
CARDIOVASCULAR NEGATIVE: 1
NUMBNESS: 0
ENDOCRINE NEGATIVE: 1
RESPIRATORY NEGATIVE: 1
GASTROINTESTINAL NEGATIVE: 1
NEUROLOGICAL NEGATIVE: 1
RESPIRATORY NEGATIVE: 1
CONSTITUTIONAL NEGATIVE: 1
RESPIRATORY NEGATIVE: 1
CARDIOVASCULAR NEGATIVE: 1
NEUROLOGICAL NEGATIVE: 1
CARDIOVASCULAR NEGATIVE: 1
PSYCHIATRIC NEGATIVE: 1
CONSTITUTIONAL NEGATIVE: 1
CONSTITUTIONAL NEGATIVE: 1

## 2020-01-01 ASSESSMENT — ANXIETY QUESTIONNAIRES
3. WORRYING TOO MUCH ABOUT DIFFERENT THINGS: NOT AT ALL
7. FEELING AFRAID AS IF SOMETHING AWFUL MIGHT HAPPEN: NOT AT ALL
7. FEELING AFRAID AS IF SOMETHING AWFUL MIGHT HAPPEN: NOT AT ALL
GAD7 TOTAL SCORE: 5
6. BECOMING EASILY ANNOYED OR IRRITABLE: SEVERAL DAYS
GAD7 TOTAL SCORE: 0
GAD7 TOTAL SCORE: 5
3. WORRYING TOO MUCH ABOUT DIFFERENT THINGS: NOT AT ALL
GAD7 TOTAL SCORE: 0
2. NOT BEING ABLE TO STOP OR CONTROL WORRYING: NOT AT ALL
5. BEING SO RESTLESS THAT IT IS HARD TO SIT STILL: NOT AT ALL
1. FEELING NERVOUS, ANXIOUS, OR ON EDGE: NOT AT ALL
IF YOU CHECKED OFF ANY PROBLEMS ON THIS QUESTIONNAIRE, HOW DIFFICULT HAVE THESE PROBLEMS MADE IT FOR YOU TO DO YOUR WORK, TAKE CARE OF THINGS AT HOME, OR GET ALONG WITH OTHER PEOPLE: NOT DIFFICULT AT ALL
IF YOU CHECKED OFF ANY PROBLEMS ON THIS QUESTIONNAIRE, HOW DIFFICULT HAVE THESE PROBLEMS MADE IT FOR YOU TO DO YOUR WORK, TAKE CARE OF THINGS AT HOME, OR GET ALONG WITH OTHER PEOPLE: SOMEWHAT DIFFICULT
5. BEING SO RESTLESS THAT IT IS HARD TO SIT STILL: MORE THAN HALF THE DAYS
6. BECOMING EASILY ANNOYED OR IRRITABLE: NOT AT ALL
2. NOT BEING ABLE TO STOP OR CONTROL WORRYING: NOT AT ALL
1. FEELING NERVOUS, ANXIOUS, OR ON EDGE: SEVERAL DAYS

## 2020-01-01 ASSESSMENT — ACTIVITIES OF DAILY LIVING (ADL): DEPENDENT_IADLS:: MONEY MANAGEMENT;SHOPPING

## 2020-01-01 ASSESSMENT — PATIENT HEALTH QUESTIONNAIRE - PHQ9
SUM OF ALL RESPONSES TO PHQ QUESTIONS 1-9: 0
SUM OF ALL RESPONSES TO PHQ QUESTIONS 1-9: 9
5. POOR APPETITE OR OVEREATING: SEVERAL DAYS
5. POOR APPETITE OR OVEREATING: NOT AT ALL

## 2020-01-01 ASSESSMENT — MIFFLIN-ST. JEOR: SCORE: 1047.93

## 2020-03-19 NOTE — TELEPHONE ENCOUNTER
Last clinic appointment was 11/7/2018. Patient will be given a one month supply of medications ordered by Dr. Reese. Over the next month patient will schedule phone visit, get labs and ultrasound at local [place]. Pt agreeable to plan.    Delores Rico LPN  Hepatology Clinic      [facility]    Phone Message    May a detailed message be left on voicemail: yes     Reason for Call: Other: pt calling because her  is very sick and with his health condition she cannot risk coming in clinic and getting him sick with the virus. The pt would like to know can she have a year worth of refills on all of her Rx. Please call the pt back to discuss.      Action Taken: Message routed to:  [facility] ([facility] Hepatology    Travel Screening: Negative

## 2020-05-15 NOTE — PROGRESS NOTES
"Romina Chavarria is a 74 year old female who is being evaluated via a billable telephone visit.      The patient has been notified of following:     \"This telephone visit will be conducted via a call between you and your physician/provider. We have found that certain health care needs can be provided without the need for a physical exam.  This service lets us provide the care you need with a short phone conversation.  If a prescription is necessary we can send it directly to your pharmacy.  If lab work is needed we can place an order for that and you can then stop by our lab to have the test done at a later time.    Telephone visits are billed at different rates depending on your insurance coverage. During this emergency period, for some insurers they may be billed the same as an in-person visit.  Please reach out to your insurance provider with any questions.    If during the course of the call the physician/provider feels a telephone visit is not appropriate, you will not be charged for this service.\"    Patient has given verbal consent for Telephone visit?  Yes    What phone number would you like to be contacted at? [phone]    How would you like to obtain your AVS? Lazaro Grider     Romina Chavarria is a 74 year old female who presents to clinic today for the following health issues:    HPI     Patient having a phone visit to follow up on lab results. Blood glucose was elevated. Patient states that she did eat before her appointment and notes that she has been eating \"lots of candy and ice cream lately.\" No family history of diabetes on either sides of her family.        Current Outpatient Medications   Medication Sig Dispense Refill     Coenzyme Q10 (CO Q 10 PO) Take 1 capsule by mouth daily       loratadine (CLARITIN) 10 MG tablet Take 1 tablet by mouth daily.       Omega-3 Fatty Acids (FISH OIL TRIPLE STRENGTH) 1400 MG CAPS Take 1 capsule by mouth once daily. Indications: CARDIAC DISEASE       potassium " chloride ER (KLOR-CON) 20 MEQ CR tablet Take 1 tablet (20 mEq) by mouth 2 times daily 60 tablet 0     UNABLE TO FIND MEDICATION NAME: Liver [place] - [employer]  Milk Thistle, Artichoke, Tumeric       blood glucose (NO BRAND SPECIFIED) lancets standard Use to test blood sugar 3 times daily or as directed. 100 each 0     blood glucose (NO BRAND SPECIFIED) test strip Use to test blood sugar 3 times daily or as directed. 100 strip 0     blood glucose monitoring (NO BRAND SPECIFIED) meter device kit Use to test blood sugar 3 times daily or as directed. 1 kit 0     empagliflozin (JARDIANCE) 10 MG TABS tablet Take 1 tablet (10 mg) by mouth daily 30 tablet 4     ferrous gluconate (FERGON) 324 (38 Fe) MG tablet Take 1 tablet (324 mg) by mouth 2 times daily (with meals) 60 tablet 0     furosemide (LASIX) 40 MG tablet Take 1 tablet (40 mg) by mouth daily 90 tablet 3     hydrOXYzine (ATARAX) 25 MG tablet Take 1-2 tablets (25-50 mg) by mouth every 6 hours as needed for itching Do not take while driving or operating machinery due to sedation. 60 tablet 1     metFORMIN (GLUCOPHAGE) 500 MG tablet Start with 1 tablet daily with breakfast for 1 week then increase to twice daily with meals. 60 tablet 0     sertraline (ZOLOFT) 100 MG tablet Take 1 tablet (100 mg) by mouth daily 30 tablet 0     spironolactone (ALDACTONE) 50 MG tablet Take 1 tablet (50 mg) by mouth daily 30 tablet 0     traZODone (DESYREL) 50 MG tablet Take 1 tablet (50 mg) by mouth At Bedtime 30 tablet 1     BP Readings from Last 3 Encounters:   05/18/20 127/89   11/07/18 133/81   05/16/18 136/78    Wt Readings from Last 3 Encounters:   11/07/18 79.9 kg (176 lb 3.2 oz)   05/16/18 82.1 kg (181 lb)   05/09/18 83.3 kg (183 lb 9.6 oz)                    Reviewed and updated as needed this visit by Provider         Review of Systems          Objective   Reported vitals:  There were no vitals taken for this visit.   healthy, alert and no  distress  PSYCH: Alert and oriented times 3; coherent speech, normal   rate and volume, able to articulate logical thoughts, able   to abstract reason, no tangential thoughts, no hallucinations   or delusions  Her affect is normal  RESP: No cough, no audible wheezing, able to talk in full sentences  Remainder of exam unable to be completed due to telephone visits    Diagnostic Test Results:  Follow up for labs.         Assessment/Plan:  1. Elevated glucose level  - Patient with newly found increase in glucose.   - Discussed symptoms of diabetes she notes some dizziness, polyuria and decrease in energy. She also notes eating lots of candy and fruit. She notes she is not eating the best.   - She also notes that she does not have any family history of diabetes. She has not had this elevated hemoglobin A1C in the past. Last checked in 2014 was 5.1%.   - Recommend Hemoglobin A1C level and may benefit from endocrinology consult given chronic illnesses and recent increase in sugars.   - Lipid panel reflex to direct LDL Fasting; Future    2. Chronic hepatitis C without hepatic coma (H)  - Followed by Dr. Reese     3. Alcoholic cirrhosis of liver without ascites (H)  - Has struggled with sobriety, this has been exceptionally hard for her given her  continues to buy alcohol and also struggles with alcohol abuse. Patient states she is motivated to stay sober.   - Lipase; Future  - Amylase; Future    4. Alcohol dependence in remission (H)    5. Morbid obesity (H)  - Discussed walking and doing small things daily.     6. Hyperlipidemia with target LDL less than 160    - Lipid panel reflex to direct LDL Fasting; Future    No follow-ups on file.      Phone call duration:  20 minutes    MELE Howard CNP

## 2020-05-15 NOTE — Clinical Note
Marcell Reese,   Met with patient today virtually. She continues to drink and I am concerned about her blood sugar levels. I did ask her to come in for a recheck, she wanted to wait until Monday. She did not have any severe symptoms of hyperglycemia and notes she is eating poorly. I will keep you posted as I added some additional labs for her. I have only seen her for some acute concerns, but Monique Ambriz is out of clinic at this time so I will take this on for now.     MELE Howard CNP  Questions or concerns please feel free to send me a [employer] message or call me  Phone : [phone]

## 2020-05-18 NOTE — TELEPHONE ENCOUNTER
Please call patient what is she needing this for? I just talked to her last week and she did not mention this.           MELE Howard CNP  Questions or concerns please feel free to send me a [employer] message or call me  Phone : [phone]

## 2020-05-18 NOTE — TELEPHONE ENCOUNTER
Routing refill request to provider for review/approval because:  Drug not on the [facility] refill protocol     Patricia Salomon, RN, BSN

## 2020-05-18 NOTE — TELEPHONE ENCOUNTER
Patient needs follow up with PCP in regards to her hepatology medications. See previous note. Please schedule. I also discussed with Dr. Reese she may need to follow up with him as well given she is drinking again.       MELE Howard CNP  Questions or concerns please feel free to send me a [employer] message or call me  Phone : [phone]

## 2020-05-18 NOTE — TELEPHONE ENCOUNTER
Pt scheduled to see Dr. Reese 5/29/2020 writer will refill requested medications for one month.    Delores Rico LPN  Hepatology Clinic    ------  Pt has not been seen in clinic in over a year but did have labs drawn for Dr. Reese. Pt reports she will be having additional lab work done for her PCP today. Pt was going to see if  Dr. Ambriz would be willing to take over medication refills ordered by Dr. Reese? Pt will update writer via [employer] message if Dr. Ambriz agrees to taken over refills. Updated pt that Dr. Reese is only doing phone or video visit as this time if appointment is needed. Pt voiced understanding and will schedule own follow-up if advised by Dr. Ambriz.    Delores Rico LPN  Hepatology Clinic

## 2020-05-18 NOTE — PROGRESS NOTES
Patient here for BP check.      Romina Chavarria is a 74 year old year old patient who comes in today for a Blood Pressure check because of medication change.  Vital Signs as repeated by RN Jason Thomas, RN, BSN        Patient is taking medication as prescribed  Patient is tolerating medications well.  Patient is monitoring Blood Pressure at home.  Average readings if yes are   Current complaints: none  Disposition:  patient to continue with the same medication

## 2020-05-18 NOTE — TELEPHONE ENCOUNTER
Pt coming today for labs and B/P check. Will try and schedule appt then. NANETTE Gomez notified.    Keesha Brandt, MSN, RN

## 2020-05-19 NOTE — PROGRESS NOTES
"Romina Chavarria is a 74 year old female who is being evaluated via a billable telephone visit.      The patient has been notified of following:     \"This telephone visit will be conducted via a call between you and your physician/provider. We have found that certain health care needs can be provided without the need for a physical exam.  This service lets us provide the care you need with a short phone conversation.  If a prescription is necessary we can send it directly to your pharmacy.  If lab work is needed we can place an order for that and you can then stop by our lab to have the test done at a later time.    Telephone visits are billed at different rates depending on your insurance coverage. During this emergency period, for some insurers they may be billed the same as an in-person visit.  Please reach out to your insurance provider with any questions.    If during the course of the call the physician/provider feels a telephone visit is not appropriate, you will not be charged for this service.\"    Patient has given verbal consent for Telephone visit?  Yes    What phone number would you like to be contacted at? [phone]    How would you like to obtain your AVS? Lazaro Grider     Romina Chavarria is a 74 year old female who presents via phone visit today for the following health issues:    HPI     Patient having phone visit today to review recent lab results, glucose was 63.6  6 days ago and 513 1 day ago. She states she had been eating a lot of candy and sweets, she has since improved her diet but is still eating a lot of fruit.     She is not sure what else she is following up on    Last drink was about a week ago. Since then has not drank.     Fall risk. Last fell last week, no pains or aches. Does not feel she needs any xrays did not hit her head    Needs nutritional help on what to eat and avoid. Eating lots of candy and sweets.       Current Outpatient Medications   Medication Sig Dispense Refill     " blood glucose (NO BRAND SPECIFIED) lancets standard Use to test blood sugar 3 times daily or as directed. 100 each 0     blood glucose (NO BRAND SPECIFIED) test strip Use to test blood sugar 3 times daily or as directed. 100 strip 0     blood glucose monitoring (NO BRAND SPECIFIED) meter device kit Use to test blood sugar 3 times daily or as directed. 1 kit 0     furosemide (LASIX) 40 MG tablet Take 1 tablet (40 mg) by mouth daily 90 tablet 3     hydrOXYzine (ATARAX) 25 MG tablet Take 1-2 tablets (25-50 mg) by mouth every 6 hours as needed for itching Do not take while driving or operating machinery due to sedation. 60 tablet 1     loratadine (CLARITIN) 10 MG tablet Take 1 tablet by mouth daily.       metFORMIN (GLUCOPHAGE) 500 MG tablet Start with 1 tablet daily with breakfast for 1 week then increase to twice daily with meals. 60 tablet 0     Omega-3 Fatty Acids (FISH OIL TRIPLE STRENGTH) 1400 MG CAPS Take 1 capsule by mouth once daily. Indications: CARDIAC DISEASE       potassium chloride ER (KLOR-CON) 20 MEQ CR tablet Take 1 tablet (20 mEq) by mouth 2 times daily 60 tablet 0     sertraline (ZOLOFT) 100 MG tablet Take 1 tablet (100 mg) by mouth daily 30 tablet 0     spironolactone (ALDACTONE) 50 MG tablet Take 1 tablet (50 mg) by mouth daily 30 tablet 0     traZODone (DESYREL) 50 MG tablet Take 1 tablet (50 mg) by mouth At Bedtime 30 tablet 1     Coenzyme Q10 (CO Q 10 PO) Take 1 capsule by mouth daily       ferrous gluconate (FERGON) 324 (38 Fe) MG tablet Take 1 tablet (324 mg) by mouth 2 times daily (with meals) 60 tablet 0     UNABLE TO FIND MEDICATION NAME: Liver [place] - [employer] by [employer]  Milk Thistle, Artichoke, Tumeric       BP Readings from Last 3 Encounters:   05/18/20 127/89   11/07/18 133/81   05/16/18 136/78    Wt Readings from Last 3 Encounters:   11/07/18 79.9 kg (176 lb 3.2 oz)   05/16/18 82.1 kg (181 lb)   05/09/18 83.3 kg (183 lb 9.6 oz)                    Reviewed  and updated as needed this visit by Provider         Review of Systems   Constitutional: Negative.    Respiratory: Negative.    Cardiovascular: Negative.    Neurological: Negative.  Negative for numbness and paresthesias.   Psychiatric/Behavioral: Negative.              Objective   Reported vitals:  There were no vitals taken for this visit.   healthy, alert and no distress  PSYCH: Alert and oriented times 3; coherent speech, normal   rate and volume, able to articulate logical thoughts, able   to abstract reason, no tangential thoughts, no hallucinations   or delusions  Her affect is normal  RESP: No cough, no audible wheezing, able to talk in full sentences  Remainder of exam unable to be completed due to telephone visits    Diagnostic Test Results:  Results for orders placed or performed in visit on 05/19/20 (from the past 24 hour(s))   Albumin Random Urine Quantitative with Creat Ratio   Result Value Ref Range    Creatinine Urine 24 mg/dL    Albumin Urine mg/L 23 mg/L    Albumin Urine mg/g Cr 94.65 (H) 0 - 25 mg/g Cr           Assessment/Plan:  1. Type 2 diabetes mellitus with hyperglycemia, without long-term current use of insulin (H)  - New diagnosis of type 2 diabetes.   - Thyroid stable, Might want to consider C-peptide although given age this is likely alcohol and lifestyle induced type 2 diabetes.   - Discussed type 2 diabetes in detail including long term implications on the body including neuropathy, retinopathy, cardiovascular risk factors. I did mention this a couple times as I am not certain she understands the severity of her diabetes. I tried to iterate this very clearly to her several time.   - I advised her at this time this is more than making diet changes, she needs medications and possibly multiple medications and if we can't get her A1C under 10 then likely she will need insulin. I think that helped her understand the severity of her diabetes.   - I would like to get Dr. Reese her hepatologist  involved as well so I did send him a message. He initially is the one that brought her elevated glucose to my attention.   - We discussed management of diet and checking blood sugars. She will be seeing diabetic education for more assistance with this as well. I also advised her to follow up with her eye doctor to get a eye exam. She denies any neuropathy and I will recommend an OV  For foot exam in the future as well. Discussed foot care and also sent with AVS  - Given her history and the level of her A1C I would like endocrinology input as well to make sure I am not missing any areas of her care. I am not certain about starting her on an ACE for kidney protection at this time. Her cholesterol is stable.   - At this time I recommend we start metformin twice daily. She will start with 1 tablet once daily with breakfast and then if tolerating increase to twice daily with meals. Will keep close monitoring of A1C and sugars as well.   - I also put in a care coordination referral as she is high risk for falls and I think she would benefit from a skilled nurse visit weekly as she starts her new diabetes care and would benefit from an OT/PT evaluation. Patients  has cancer and I know that it is hard for her to quit drinking cause he continues to bring alcohol into the home. She has not had a drink in 1 week and notes she will continue to be sober as I advised her this would only make managing diabetes harder and impacts her overall health.   - CARE COORDINATION REFERRAL  - AMBULATORY ADULT DIABETES EDUCATOR REFERRAL; Future  - ENDOCRINOLOGY ADULT REFERRAL  - OPHTHALMOLOGY ADULT REFERRAL  - metFORMIN (GLUCOPHAGE) 500 MG tablet; Start with 1 tablet daily with breakfast for 1 week then increase to twice daily with meals.  Dispense: 60 tablet; Refill: 0  - blood glucose monitoring (NO BRAND SPECIFIED) meter device kit; Use to test blood sugar 3 times daily or as directed.  Dispense: 1 kit; Refill: 0  - blood glucose  (NO BRAND SPECIFIED) lancets standard; Use to test blood sugar 3 times daily or as directed.  Dispense: 100 each; Refill: 0  - blood glucose (NO BRAND SPECIFIED) test strip; Use to test blood sugar 3 times daily or as directed.  Dispense: 100 strip; Refill: 0    2. Alcohol abuse   - See note above   - CARE COORDINATION REFERRAL    3. Alcoholic cirrhosis of liver without ascites (H)  - See note above   - CARE COORDINATION REFERRAL    4. Chronic hepatitis C without hepatic coma (H)  - Due for visit with Dr. Reese   - CARE COORDINATION REFERRAL    5. Hyperlipidemia with target LDL less than 160  - Stable       6. Risk for falls  - May benefit from PT/ OT evaluation and treatment at home.   - AMBULATORY ADULT DIABETES EDUCATOR REFERRAL; Future    7. Thrombocytopenia (H)  - CBC stable     8. Insomnia  - Advised not to take if drinking  - Ok to take as long as she uses appropriately. I will be monitoring this carefully.Previously prescribed by her hepatologist.   - traZODone (DESYREL) 50 MG tablet; Take 1 tablet (50 mg) by mouth At Bedtime  Dispense: 30 tablet; Refill: 1    Return in about 2 weeks (around 6/3/2020) for recheck symptoms.      Phone call duration:20 minutes    MELE Howard CNP

## 2020-05-19 NOTE — TELEPHONE ENCOUNTER
Spoke to patient, she is available to return to the clinic for labs today. Regarding [facility]'s recommendation for curbside labs, one of the labs is a urine albumin. Explained to patient writer would verify with [facility] if this needs to be done today and if yes, if ok to come into the clinic with a mask on to leave a urine sample.    Left message for [facility] for recs.    Hamida Pradhan, BRETTN, RN, PHN

## 2020-05-19 NOTE — TELEPHONE ENCOUNTER
RN;s please contact patient in regards to below.     Patient on my schedule for tomorrow. Unfortunately the labs that we needed were not drawn. Could we try and get her in today to do these curbside again? That way we have what we need to discuss diabetes and where she is at.     I will put new orders in. Also, I understand she wants me to help with her hepatology medications (reviewed previous notes). I would like her to make a virtual appointment with Dr. Reese as well in the next 1-2 weeks. Please assist with scheduling this. She needs close monitoring of her liver by a specialist as well. Especially if she has a new diagnosis of diabetes, depending on the labs that I will order today.       MELE Howard CNP  Questions or concerns please feel free to send me a [email or web address] message or call me  Phone : [phone]

## 2020-05-19 NOTE — TELEPHONE ENCOUNTER
Spoke to loretta HERMOSILLO to not collect urine for albumin lab today. Today curbside, draw TSH, A1C, and glucose.    A bruise was noted on patient's upper back yesterday by RN at the curbside lab visit and reported to [facility].    Ask if she is having any UTI symptoms.    Called patient, she is still available any time and denies urinary symptoms.   Called [facility] lab, per loretta Villa to double book 1:45 spot for a curbside draw today.  Scheduled.  Called [facility] RN, made her aware of this plan.   Called patient, she agrees with this plan, will follow same process as she did yesterday for curbside lab today at 1:45pm.    Routed to [facility] registration for scheduling with Dr. Reese.  Hamida Pradhan, BSN, RN, PHN

## 2020-05-20 PROBLEM — E11.9 DIABETES MELLITUS, TYPE 2 (H): Status: ACTIVE | Noted: 2020-01-01

## 2020-05-20 NOTE — PATIENT INSTRUCTIONS
You have a new diagnosis of type 2 diabetes.    I will start you on a medication called metformin 500 mg to take once daily with breakfast for 1 week then twice daily with meals thereafter.  If you have any side effects, let me know.     I would like you to start checking your blood sugars 3 times daily, once before breakfast, once before supper, and once 2 hours after supper.     Diabetes Goals    - A1C < 7%    - AM Fasting <120    - Before supper     - 2 hours after supper <180    - BP < 130/80    - Lipids (LDL) <100    - Triglycerides <150      Will send you to the diabetic educator along with an endocrinologist for further instruction and discussion of your newly diagnosed diabetes.     I recommend healthier diet, trying to eat 4-5 smaller meals throughout the day along with cutting back on the sugary, fatty foods.       You should also undergo a yearly eye exam.     Follow up with me or PCP in 2 weeks.       Patient Education     Metformin tablets  Brand Name: Glucophage  What is this medicine?  METFORMIN (met FOR min) is used to treat type 2 diabetes. It helps to control blood sugar. Treatment is combined with diet and exercise. This medicine can be used alone or with other medicines for diabetes.  How should I use this medicine?  Take this medicine by mouth. Take it with meals. Swallow the tablets with a drink of water. Follow the directions on the prescription label. Take your medicine at regular intervals. Do not take your medicine more often than directed.  Talk to your pediatrician regarding the use of this medicine in children. While this drug may be prescribed for children as young as 10 years of age for selected conditions, precautions do apply.  What side effects may I notice from receiving this medicine?  Side effects that you should report to your doctor or health care professional as soon as possible:    allergic reactions like skin rash, itching or hives, swelling of the face, lips, or  tongue    breathing problems    feeling faint or lightheaded, falls    muscle aches or pains    signs and symptoms of low blood sugar such as feeling anxious, confusion, dizziness, increased hunger, unusually weak or tired, sweating, shakiness, cold, irritable, headache, blurred vision, fast heartbeat, loss of consciousness    slow or irregular heartbeat    unusual stomach pain or discomfort    unusually tired or weak  Side effects that usually do not require medical attention (report to your doctor or health care professional if they continue or are bothersome):    diarrhea    headache    heartburn    metallic taste in mouth    nausea    stomach gas, upset  What may interact with this medicine?  Do not take this medicine with any of the following medications:    dofetilide    gatifloxacin    certain contrast medicines given before X-rays, CT scans, MRI, or other procedures  This medicine may also interact with the following medications:    acetazolamide    certain medicines for HIV infection or hepatitis, like adefovir, emtricitabine, entecavir, lamivudine, or tenofovir    cimetidine    crizotinib    digoxin    diuretics    female hormones, like estrogens or progestins and birth control pills    glycopyrrolate    isoniazid    lamotrigine    medicines for blood pressure, heart disease, irregular heart beat    memantine    midodrine    methazolamide    morphine    nicotinic acid    phenothiazines like chlorpromazine, mesoridazine, prochlorperazine, thioridazine    phenytoin    procainamide    propantheline    quinidine    quinine    ranitidine    ranolazine    steroid medicines like prednisone or cortisone    stimulant medicines for attention disorders, weight loss, or to stay awake    thyroid medicines    topiramate    trimethoprim    trospium    vancomycin    vandetanib    zonisamide  What if I miss a dose?  If you miss a dose, take it as soon as you can. If it is almost time for your next dose, take only that  dose. Do not take double or extra doses.  Where should I keep my medicine?  Keep out of the reach of children.  Store at room temperature between 15 and 30 degrees C (59 and 86 degrees F). Protect from moisture and light. Throw away any unused medicine after the expiration date.  What should I tell my health care provider before I take this medicine?  They need to know if you have any of these conditions:    anemia    frequently drink alcohol-containing beverages    become easily dehydrated    heart attack    heart failure that is treated with medications    kidney disease    liver disease    polycystic ovary syndrome    serious infection or injury    vomiting    an unusual or allergic reaction to metformin, other medicines, foods, dyes, or preservatives    pregnant or trying to get pregnant    breast-feeding  What should I watch for while using this medicine?  Visit your doctor or health care professional for regular checks on your progress.  A test called the HbA1C (A1C) will be monitored. This is a simple blood test. It measures your blood sugar control over the last 2 to 3 months. You will receive this test every 3 to 6 months.  Learn how to check your blood sugar. Learn the symptoms of low and high blood sugar and how to manage them.  Always carry a quick-source of sugar with you in case you have symptoms of low blood sugar. Examples include hard sugar candy or glucose tablets. Make sure others know that you can choke if you eat or drink when you develop serious symptoms of low blood sugar, such as seizures or unconsciousness. They must get medical help at once.  Tell your doctor or health care professional if you have high blood sugar. You might need to change the dose of your medicine. If you are sick or exercising more than usual, you might need to change the dose of your medicine.  Do not skip meals. Ask your doctor or health care professional if you should avoid alcohol. Many nonprescription cough and cold  products contain sugar or alcohol. These can affect blood sugar.  This medicine may cause ovulation in premenopausal women who do not have regular monthly periods. This may increase your chances of becoming pregnant. You should not take this medicine if you become pregnant or think you may be pregnant. Talk with your doctor or health care professional about your birth control options while taking this medicine. Contact your doctor or health care professional right away if think you are pregnant.  If you are going to need surgery, a MRI, CT scan, or other procedure, tell your doctor that you are taking this medicine. You may need to stop taking this medicine before the procedure.  Wear a medical ID bracelet or chain, and carry a card that describes your disease and details of your medicine and dosage times.  NOTE:This sheet is a summary. It may not cover all possible information. If you have questions about this medicine, talk to your doctor, pharmacist, or health care provider. Copyright  2019 [employer]           Patient Education     Understanding Type 2 Diabetes  When your body is working normally, the food you eat is digested and used as fuel. This fuel supplies energy to the body s cells. When you have diabetes, the fuel can t enter the cells. Without treatment, diabetes can cause serious long-term health problems.     Your body breaks down the food you eat into glucose.   How the body gets energy  The digestive system breaks down food, resulting in a sugar called glucose. Some of this glucose is stored in the liver. But most of it enters the bloodstream and travels to the cells to be used as fuel. Glucose needs the help of a hormone called insulin to enter the cells. Insulin is made in the pancreas. It is released into the bloodstream in response to the presence of glucose in the blood. Think of insulin as a key. When insulin reaches a cell, it attaches to the cell wall. This signals the cell to create an  opening that allows glucose to enter the cell.      When you have type 2 diabetes  Early in type 2 diabetes, your cells don t respond properly to insulin. Because of this, less glucose than normal moves into cells. This is called insulin resistance. In response, the pancreas makes more insulin. But eventually, the pancreas can t produce enough insulin to overcome insulin resistance. As less and less glucose enters cells, it builds up to a harmful level in the bloodstream. This is known as high blood sugar or hyperglycemia. The result is type 2 diabetes. The cells become starved for energy, which can leave you feeling tired and rundown.  Why high blood sugar is a problem  If high blood sugar is not controlled, blood vessels throughout the body become damaged. Prolonged high blood sugar affects organs, blood vessels, and nerves. As a result, the risks of damage to the heart, kidneys, eyes, and limbs increase. Diabetes also makes other problems, such as high blood pressure and high cholesterol, more dangerous. Over time, people with uncontrolled high blood sugar have an increase in risk of dying of, or being disabled by, heart attack, heart failure,  or stroke.  Date Last Reviewed: 7/1/2016 2000-2019 [employer]. [address] [place] [address]. All rights reserved. This information is not intended as a substitute for professional medical care. Always follow your healthcare professional's instructions.           Patient Education     Diabetic Foot Care  Diabetes can lead to a number of foot problems. People with diabetes have a 3 in 25 to 1 in 4 chance of getting a foot sore during their lifetime. But you can prevent these with a little extra foot care. If your diabetes is not well controlled, it can harm blood vessels. This results in poor blood flow to your feet. When your skin doesn't get enough blood flow, you are more likely to get pressure injuries. These heal slowly.  Diabetes can also  damage nerves. This interferes with your ability to feel pain and pressure. When you can t feel your foot normally, it is easy to injure your skin, bones, and joints without knowing it. For these reasons, diabetes increases the risk for fungal infections, bunions, and pressure injuries. A pressure injury is a sore or break in the skin. With these injuries, the skin seems to have worn away. Deep injuries can lead to bone infection.  Gangrene is the most serious foot problem of diabetes. It usually occurs on the tips of the toes as blackened areas of skin. The black area is dead tissue. In severe cases, gangrene spreads to the entire toe, other toes, and the whole foot. You may need to have your foot, toe, or even leg amputated. Good foot care and blood sugar control can prevent this.  Home care    Wear comfortable, well-fitting shoes.    Wash your feet daily with warm water and mild soap.    After drying, put a moisturizing cream or lotion on the top and bottom of your feet. Don't put lotion between toes.    Check your feet daily for skin breaks, blisters, swelling, or redness. Look between your toes as well. If you can't see the bottoms of your feet, ask someone to look or use a mirror.    Wear cotton socks and change them every day.    Trim toenails carefully, and don't cut your cuticles. Ask your healthcare provider to trim any corns or calluses.    Keep your blood sugar under control with medicines, diet, and activity.    If you smoke and have diabetes, it's very important that you stop. Smoking reduces blood flow to your foot.    Schedule foot exams at least every year, or more often if you have foot problems.    Put your feet up when sitting, wiggle toes, and move ankles to help improve blood flow.  Don't do activities that increase your risk of foot injury:    Don't walk barefoot.    Don't use heating pads or hot water bottles on your feet.    Don't put your foot in a hot tub without first checking the  temperature with your hand.  Follow-up care  Follow up with your healthcare provider, or as advised. Take off your shoes and socks before your appointment starts. This will remind your healthcare provider to check your feet. Report any cut, puncture, scrape, blister, or other injury to your foot. Also report if you have a bunion, hammertoes, ingrown toenail, or pressure injury on your foot.  When to seek medical advice  Call your healthcare provider right away if any of these occur:    Black skin color anywhere on the foot    Open sore with pus draining from the wound    Increasing foot or leg pain    New areas of redness or swelling or tender areas of the foot    Fever of 100.4 F (38 C) or greater, or as advised by your healthcare provider  Date Last Reviewed: 9/1/2018 2000-2019 [employer]. [address] [place] [address]. All rights reserved. This information is not intended as a substitute for professional medical care. Always follow your healthcare professional's instructions.           Patient Education     Goals for Your Diabetes Care  A1c   Goal:  Less than 7 percent--ask your doctor if this is right for you  Check it: Every 3 to 6 months  Why:  Shows how well your blood glucose was controlled over the past 3 months  Blood pressure   Goal: Under 140/90  Check it:  At every clinic check up for diabetes  Why: May prevent stroke, heart disease and eye and kidney diseases  Cholesterol   Goal:  Discuss cholesterol management with your doctor, and consider taking a statin medicine  Check it:  Every year  Why:  Helps prevent heart attacks and stroke  Kidney test (urine microalbumin)  Goal:  Under 30  Do it:  Every year  Why:  Finds kidney problems before they get worse  Foot exam   Goal:  No cuts or sores, normal sensation  Do it: Remove shoes and socks at every visit; have a monofilament test every year  Why: Finds foot problems before they get worse  Eye exam   Goal:  No changes in your  eyes  Do it: Every year  Why:  Finds eye problems before they get worse  Exercise  Goal:  150 minutes of aerobic exercises and 2 to 3 sessions of strength training  Do it: Every week  Why:  Helps manage diabetes and improve health  Aspirin  Goal:  If you are age 50 or older, ask your doctor if you should take aspirin  Take it: Every day, or as prescribed  Why: Lowers the risk of heart attack and stroke  Smoking and tobacco  Goal: No tobacco use  Why: Tobacco is a major risk for heart disease  Diabetes education  Goal: Learn how to manage your diabetes  Do it: At least once a year---ask your doctor for a referral  Why: The more you know, the better you can manage your diabetes  Your goals may be different from what you see here. Your care team will tell you what your goals should be.   For informational purposes only. Not to replace the advice of your health care provider.   Copyright   2009 [employer]. All rights reserved. [employer] 946473 - Rev 01/16.  For informational purposes only. Not to replace the advice of your health care provider.  Copyright   2018 [employer]. All rights reserved.           Patient Education     Step-by-Step:  Treating High Blood Sugar (Hyperglycemia)    Date Last Reviewed: 12/1/2016 2000-2019 [employer]. [address] [place] [address]. All rights reserved. This information is not intended as a substitute for professional medical care. Always follow your healthcare professional's instructions.           Patient Education     Treating Low Blood Sugar (Hypoglycemia)  Step-by-Step:    Date Last Reviewed: 4/1/2019 2000-2019 [employer]. [address] [place] [address]. All rights reserved. This information is not intended as a substitute for professional medical care. Always follow your healthcare professional's instructions.           Patient Education     Treating Low Blood Sugar  (Hypoglycemia)  Step-by-Step:    Date Last Reviewed: 4/1/2019 2000-2019 [employer]. [address], [place] [address]. All rights reserved. This information is not intended as a substitute for professional medical care. Always follow your healthcare professional's instructions.

## 2020-05-20 NOTE — TELEPHONE ENCOUNTER
Diabetes Education Scheduling Outreach #1:    Call to patient to schedule. Left message with phone number to call to schedule.    Plan for 2nd outreach attempt within 2 business days.    Porsche Jacinto [employer]  Diabetes and Nutrition Scheduling

## 2020-05-21 NOTE — TELEPHONE ENCOUNTER
Scheduled patient to do telephone visit with Dr. Reese on 05/29 at 10:30 am. Number to reschedule if this does not work is [phone].    Called and informed patient.     Danielito Howe,

## 2020-05-22 NOTE — LETTER
Dear Romina,    I am a clinic care coordinator who works [facility].  Below is a description of clinic care coordination and how I can further assist you.      The clinic care coordination team is made up of a registered nurse,  and community health worker who understand the health care system. The goal of clinic care coordination is to help you manage your health and improve access to the health care system in the most efficient manner. The team can assist you in meeting your health care goals by providing education, coordinating services, strengthening the communication among your providers and supporting you with any resource needs.    Please feel free to contact me at [phone] with any questions or concerns. We are focused on providing you with the highest-quality healthcare experience possible and that all starts with you.     Sincerely,     REGGIE Hidalgo RN Clinic Care Coordinator   [place], [place], [place]  Phone: [phone]

## 2020-05-22 NOTE — PROGRESS NOTES
"Date Pre-B Post-B Pre-L Post-L Pre-D Post-D  HS/NOC   5/22/20 349           Patient has just been diagnosed and started checking blood sugar this morning. Patient took 1 metformin yesterday and is taking 1 now.      Romina Chavarria is a 74 year old female who is being evaluated via a billable telephone visit.      The patient has been notified of following:     \"This telephone visit will be conducted via a call between you and your physician/provider. We have found that certain health care needs can be provided without the need for a physical exam.  This service lets us provide the care you need with a short phone conversation.  If a prescription is necessary we can send it directly to your pharmacy.  If lab work is needed we can place an order for that and you can then stop by our lab to have the test done at a later time.    Telephone visits are billed at different rates depending on your insurance coverage. During this emergency period, for some insurers they may be billed the same as an in-person visit.  Please reach out to your insurance provider with any questions.    If during the course of the call the physician/provider feels a telephone visit is not appropriate, you will not be charged for this service.\"    Patient has given verbal consent for Telephone visit?  Yes    What phone number would you like to be contacted at? [phone]    How would you like to obtain your AVS? [facility]    Phone call duration: 35 minutes  Alyce Salazar MD  Endocrinology and Diabetes    Pager [phone]      DIABETES New Visit  Romina Chavarria    MRN: [id number]    1945  74 year old   female       [facility]    Romina Chavarria is a 74 year old female who is being evaluated via a billable telephone visit.      The patient has been notified of following:     \"This telephone visit will be conducted via a call between you and your physician/provider. We have found that certain health care needs can " "be provided without the need for an in-person physical exam.  This service lets us provide the care you need with a telephone conversation.  If a prescription is necessary we can send it directly to your pharmacy.  If lab work is needed we can place an order for that and you can then stop by our lab to have the test done at a later time.    If during the course of the call the physician/provider feels a telephone visit is not appropriate, you will not be charged for this service.\"     Patient has given verbal consent for Telephone visit? Yes    Telephone Start Time: 11:15AM    Romina Chavarria complains of    Chief Complaint   Patient presents with     Consult     Diabetes     new diagnosis       I have reviewed and updated the patient's Past Medical History, Social History, Family History and Medication List.    ALLERGIES  No known drug allergies      Telephone-Visit Details    Type of service:  Telephone Visit      Distant Location (provider location):  [facility]     Alyce Salazar MD  Endocrinology and Diabetes    Pager [phone]  Romina Chavarria complains of    Chief Complaint   Patient presents with     Consult     Diabetes     new diagnosis       I have reviewed and updated the patient's Past Medical History, Social History, Family History and Medication List.    ALLERGIES  No known drug allergies    .  HPI:  Romina is a 74-year-old woman with new diagnosis of diabetes with A1c of 12%.  The patient presents with involuntary 25 pound weight loss over the last 6 months due to decrease in appetite, urinary frequency and urgency.  She notes that she has been unsteady on her feet and dizzy and weak.  Over the last 3 days she changed her eating from eating lots of candy and fruit to more carb restricted diet avoiding simple sugars.  She already feels much better in terms of energy, dizziness and polyuria.   She.started metformin 500 mg twice daily which she tolerates without nausea " abdominal pain or diarrhea.    There is no prior history of glucose intolerance although it is not been checked recently.    Significant comorbidity of hepatitis C and EtOH abuse.  Patient reports not drinking in the last week however having had a couple of beer a month ago.  Is a longstanding problem, her  also struggles with alcohol abuse.    Diet: patient typically eats late breakfast and supper, might skip meals,  as stated above switched to healthier snacks.  Breakfast now is a Greek yogurt and sweetened grapefruit, dinner the last 2 nights with fish and vegetables.  Her  does the cooking  Physical activity quite limited especially recently due to the dizziness.      Eyes denies blurriness, has establish care with ophthalmologist who she intends to see soon    Feet fine-occasional swelling of the right leg, no toenail thickening according to the patient, no ulcerations, no numbness, no tingling    Bone health: patient fractured her left arm falling down the stairs tripping over her cat around age 64, prior do not bone density shows osteopenia     FH There is no family history of diabetes    Patient supposed to follow-up with the diabetes educator soon and might have received calls while we were on the phone    Cardiovascular history: no myocardial infarction, no stent placemnt , no stroke      Review of Systems:    Complete 10 point review of systems in addition to stated above is notable for hair loss     PMH:  Patient Active Problem List   Diagnosis     Hepatitis in viral disease     Chronic hepatitis C virus infection (H)     Lumbago     Disorder of bone and cartilage     Generalized osteoarthrosis, unspecified site     Neoplasm of unspecified nature of other specified sites     Blood transfusion without reported diagnosis     ABO incompatibility reaction due to transfusion of blood or blood products     Alcohol abuse     Cataract     Venous stasis dermatitis     Venous stasis of lower  extremity     Advanced directives, counseling/discussion     Alcoholic cirrhosis of liver (H)     At risk for falling     Personal history of fall     Anxiety     Hyperlipidemia with target LDL less than 160     Chronic hepatitis C (H)     Iron deficiency anemia due to chronic blood loss     Hemorrhage of gastrointestinal tract     Benign neoplasm of colon     Diverticulosis of large intestine     Hemorrhoids with complication     Hiatal hernia     Alcohol dependence in remission (H)     Morbid obesity (H)     Thrombocytopenia (H)     Diabetes mellitus, type 2 (H)       SOCIAL HISTORY:  Social History     Socioeconomic History     Marital status:      Spouse name: Neal     Number of children: 2     Years of education: 12     Highest education level: Not on file   Occupational History     Occupation: Disability     Employer: [employer]     Comment: former    Social Needs     Financial resource strain: Not on file     Food insecurity     Worry: Not on file     Inability: Not on file     Transportation needs     Medical: Not on file     Non-medical: Not on file   Tobacco Use     Smoking status: Never Smoker     Smokeless tobacco: Never Used     Tobacco comment: no passive exposure at home   Substance and Sexual Activity     Alcohol use: No     Alcohol/week: 0.0 standard drinks     Drug use: No     Sexual activity: Yes     Partners: Male     Birth control/protection: Surgical     Comment: not currently active   Lifestyle     Physical activity     Days per week: Not on file     Minutes per session: Not on file     Stress: Not on file   Relationships     Social connections     Talks on phone: Not on file     Gets together: Not on file     Attends Restoration service: Not on file     Active member of club or organization: Not on file     Attends meetings of clubs or organizations: Not on file     Relationship status: Not on file     Intimate partner violence     Fear of current or ex partner: Not on file      Emotionally abused: Not on file     Physically abused: Not on file     Forced sexual activity: Not on file   Other Topics Concern      Service Not Asked     Blood Transfusions Yes     Comment: 1985- Hep C as a result     Caffeine Concern Not Asked     Occupational Exposure No     Hobby Hazards Not Asked     Sleep Concern Not Asked     Stress Concern Not Asked     Weight Concern Yes     Comment: Fluctuates     Special Diet Yes     Back Care Not Asked     Exercise Yes     Bike Helmet Not Asked     Seat Belt Yes     Self-Exams No     Parent/sibling w/ CABG, MI or angioplasty before 65F 55M? No   Social History Narrative     Not on file       FAMILY HISTORY:  History of Diabetes mellitus in the family: no    Medications:   Current Outpatient Medications   Medication Sig Dispense Refill     blood glucose (NO BRAND SPECIFIED) lancets standard Use to test blood sugar 3 times daily or as directed. 100 each 0     blood glucose (NO BRAND SPECIFIED) test strip Use to test blood sugar 3 times daily or as directed. 100 strip 0     blood glucose monitoring (NO BRAND SPECIFIED) meter device kit Use to test blood sugar 3 times daily or as directed. 1 kit 0     ferrous gluconate (FERGON) 324 (38 Fe) MG tablet Take 1 tablet (324 mg) by mouth 2 times daily (with meals) 60 tablet 0     furosemide (LASIX) 40 MG tablet Take 1 tablet (40 mg) by mouth daily 90 tablet 3     hydrOXYzine (ATARAX) 25 MG tablet Take 1-2 tablets (25-50 mg) by mouth every 6 hours as needed for itching Do not take while driving or operating machinery due to sedation. 60 tablet 1     loratadine (CLARITIN) 10 MG tablet Take 1 tablet by mouth daily.       metFORMIN (GLUCOPHAGE) 500 MG tablet Start with 1 tablet daily with breakfast for 1 week then increase to twice daily with meals. 60 tablet 0     Omega-3 Fatty Acids (FISH OIL TRIPLE STRENGTH) 1400 MG CAPS Take 1 capsule by mouth once daily. Indications: CARDIAC DISEASE       potassium chloride ER  (KLOR-CON) 20 MEQ CR tablet Take 1 tablet (20 mEq) by mouth 2 times daily 60 tablet 0     sertraline (ZOLOFT) 100 MG tablet Take 1 tablet (100 mg) by mouth daily 30 tablet 0     spironolactone (ALDACTONE) 50 MG tablet Take 1 tablet (50 mg) by mouth daily 30 tablet 0     traZODone (DESYREL) 50 MG tablet Take 1 tablet (50 mg) by mouth At Bedtime 30 tablet 1     Coenzyme Q10 (CO Q 10 PO) Take 1 capsule by mouth daily       UNABLE TO FIND MEDICATION NAME: Liver Dane - [employer]  Milk Thistle, Artichoke, Tumeric         PHYSICAL EXAM:   There were no vitals taken for this visit.  Weight 155 lbs today according to pt     Reported vitals:  There were no vitals taken for this visit.   healthy, alert and no distress  PSYCH: Alert and oriented times 3; coherent speech, normal   rate and volume, able to articulate logical thoughts, able   to abstract reason, no tangential thoughts, no hallucinations   or delusions  Her affect is normal and pleasant  RESP: No cough, no audible wheezing, able to talk in full sentences  Remainder of exam unable to be completed due to telephone visits       LABS:    Lab Results   Component Value Date     (L) 05/14/2020    CHLORIDE 94 05/14/2020    CO2 23 05/14/2020     (HH) 05/19/2020    CR 0.55 05/14/2020    CR 0.84 10/31/2018    CR 0.78 02/09/2018    CR 0.75 10/24/2017    CR 0.77 05/23/2017    TOYA 8.5 05/14/2020    MAG 2.3 12/14/2003    ALBUMIN 3.1 (L) 05/14/2020    ALKPHOS 113 05/14/2020    LDL 51 05/18/2020    HDL 78 05/18/2020    TRIG 76 05/18/2020     Lab Results   Component Value Date    MICROL 23 05/19/2020    MICROL 14 11/20/2007     Lab Results   Component Value Date    A1C 12.9 (H) 05/19/2020    A1C 5.1 02/12/2014    A1C 5.6 05/11/2009    A1C 5.7 02/25/2008       Lab Results   Component Value Date    HGB 13.8 05/14/2020         ASSESSMENT/plan  Middle-aged woman with with newly diagnosed diabetes presenting with weight loss anorexia  and polyuria with comorbidity of chronic hepatitis with hepatitis C and history of alcohol abuse.     It is unclear whether this patient is type I or type IIb.  At her prior weight she was obese.  However she does not have a family history of diabetes, prior A1c up to age 69 were normal, her lipid profile is completely normal.  Therefore I would like to check a IDALIA antibody.    Considering her A1c high A1c I would like to start a second agent in addition to the metformin. I would not start this patient on a sulfonylurea medication due to irregular food intake and history of alcoholism with high risk of hypoglycemia on this medication.  Reluctant to start a DPP 4 inhibitor like sitagliptin due to EtOH abuse in the past and risk of pancreatitis.  The patient will start SGLT2 inhibitor Jardiance or which ever is covered by insurance .   Tthe patient already made quite impressive changes to her diet and seems to feel better.    She denies symptoms in her feet.  Needs to  see her ophthalmologist soon.    Would recheck microalbumin once blood glucose is under better control    Patient should also have repeat of her bone density once more settled this diabetes management especially considering her significant weight loss which we know was associated with increased risk for fracture of the age of 71     PLAN:  Patient to contact the clinic if blood glucose (sugar) is under 70 two times in one week or above 200 for 3 consecutive days.       Patient was advised to bring blood glucose meter, supplies and medications with to all clinic visits.     Alyce Salazar MD  Endocrinology and Diabetes    Pager [phone]      Lab Results   Component Value Date    A1C 12.9 05/19/2020    A1C 5.1 02/12/2014    A1C 5.6 05/11/2009    A1C 5.7 02/25/2008     Lab Results   Component Value Date     (L) 05/14/2020    CHLORIDE 94 05/14/2020    CO2 23 05/14/2020     (HH) 05/19/2020    CR 0.55 05/14/2020    CR 0.84  10/31/2018    CR 0.78 02/09/2018    CR 0.75 10/24/2017    CR 0.77 05/23/2017    TOYA 8.5 05/14/2020    MAG 2.3 12/14/2003    ALBUMIN 3.1 (L) 05/14/2020    ALKPHOS 113 05/14/2020    LDL 51 05/18/2020    HDL 78 05/18/2020    TRIG 76 05/18/2020     Lab Results   Component Value Date    MICROL 23 05/19/2020    MICROL 14 11/20/2007     Lab Results   Component Value Date    A1C 12.9 (H) 05/19/2020    A1C 5.1 02/12/2014    A1C 5.6 05/11/2009    A1C 5.7 02/25/2008       Lab Results   Component Value Date    HGB 13.8 05/14/2020

## 2020-05-22 NOTE — PATIENT INSTRUCTIONS
Continue with diet low in sugar, especially processed food sugar  Ensure you get enough protein in in your diet such as from fish chicken and legumes like beans and lentils if tolerated in terms of your stomach  Eat fruit in moderation for example 1 serving 3 times a day  Many vegetables you can eat as much as you want off especially the non-starchy vegetables such as toast green salad, cucumbers, peppers, summer squash, [place] sprouts, raw carrots and raw tomatoes  Ensure you are going to meet with the diabetes educator  Continue on metformin 1 tablet twice a day  Start Januvia 1 tablet daily this is a new medication for diabetes      Do FASTING blood work soon at your [facility] clinic    Follow-up with me in 4 weeks.

## 2020-05-22 NOTE — PROGRESS NOTES
Clinic Care Coordination Contact  [facility]/Voicemail    Referral Source: Care Team  Clinical Data: Care Coordinator Outreach  Outreach attempted x 1.  Left message on patient's voicemail with call back information and requested return call.  Plan: Care Coordinator will try to reach patient again in 1-2 business days.    BRETT HidalgoN RN Clinic Care Coordinator   [place], [place], [place]  Phone: [phone]

## 2020-05-22 NOTE — LETTER
[facility]  Complex Care Plan  About Me:    Patient Name:  Romina Vasques    YOB: 1945  Age:         74 year old   [place] MRN:    [id number] Telephone Information:  Home Phone [phone]   Mobile [phone]       Address:  [address] [place] [address] Email address:  [email or web address]      Emergency Contact(s)    Name Relationship Lgl Grd Work Phone Home Phone Mobile Phone   1. SHERMAN VASQUES Spouse  [phone]   2. MONIQUE LLAMAS Daughter   [phone]            Primary language:  English     needed? No   [place] [employer] [facility]:  [phone] op. 1  Other communication barriers: Glasses, [place] [facility] (Hard of hearing), Hearing aides  Preferred Method of Communication:  Mail  Current living arrangement: I live in a private home with spouse  Mobility Status/ Medical Equipment: Independent    Health Maintenance  Health Maintenance Reviewed: Not assessed    My Access Plan  Medical Emergency 911   Primary Clinic Line [facility] - [phone]   24 Hour Appointment Line [phone] or  [phone] ([phone]) (toll-free)   24 Hour Nurse Line [phone] (toll-free)   Preferred Urgent Care [facility] [phone]   Preferred Hospital [facility]  [phone]   Preferred Pharmacy [facility] - [facility] MAIL ORDER [facility]     Behavioral Health Crisis Line The National Suicide Prevention Lifeline at 1-890.227.6181 or 911             My Care Team Members  Patient Care Team       Relationship Specialty Notifications Start End    Monique Ambriz MD PCP - General Family Practice  5/16/18     Phone: [phone] Fax: [phone]         [address] [facility] [address]    José Antonio Reese MD MD Gastroenterology  4/5/12     Phone: [phone] Fax: [phone]         [address] [facility] [address]    Liz Richard APRN CNP Assigned PCP   11/5/17     Phone:  [phone] Fax: [phone]         [address] [facility] [address]    Marielos Miller, RN Lead Care Coordinator Primary Care - CC  5/22/20     Phone: [phone] Fax: [phone]                My Care Plans  Self Management and Treatment Plan  Goals and (Comments)  Goals        General    Medical (pt-stated)     Notes - Note edited  5/22/2020  2:47 PM by Marielos Miller, RN    Goal Statement: I want to achieve an A1C of < 7.0  Date goal set: 5/22/20  Measure of Success: Patient's A1C will be less than or equal to 7.0   Barriers: new diagnosis for patient  Strengths: Motivated to engage in Clinic Care Coordination; willing to implement home strategies for improved glucose control  Date to Achieve By: 2-3 week, also ongoing.   Patient expressed understanding of goal: Patient participated in goal setting and supportive steps to achieve    Action steps to achieve this goal  1. I will follow a diabetic friendly diet, referring to the education I received at my endocrinologist appointment today via [employer].  2. I will discuss medication management strategies with my primary care provider, endocrinologist, and diabetic education nurse  4. I will participate in regular Clinic Care Coordination outreaches for ongoing support, resources, and education        Other (pt-stated)     Notes - Note created  5/22/2020  2:44 PM by Marielos Miller, RN    Goal Statement: I do not want to have another fall  Date Goal set: 5/22/20  Barriers: patient fell last week while walking in the dark at night.   Strengths: patient stated goal  Date to Achieve By: 1 month  Patient expressed understanding of goal: yes  Action steps to achieve this goal:  -Remove trip hazards to keep pathways clear in the home  -Remove throw rugs  -Keep frequently used items in easy to reach places  -Use non-slip mats in the bathtub and on shower floors  -Improve lighting in your home in all areas  -Wear  shoes or non-slip footwear inside the house   -I will follow the treatment plans as advised by my medical providers               Action Plans on File:                       Advance Care Plans/Directives Type:        My Medical and Care Information  Problem List   Patient Active Problem List   Diagnosis     Hepatitis in viral disease     Chronic hepatitis C virus infection (H)     Lumbago     Disorder of bone and cartilage     Generalized osteoarthrosis, unspecified site     Neoplasm of unspecified nature of other specified sites     Blood transfusion without reported diagnosis     ABO incompatibility reaction due to transfusion of blood or blood products     Alcohol abuse     Cataract     Venous stasis dermatitis     Venous stasis of lower extremity     Advanced directives, counseling/discussion     Alcoholic cirrhosis of liver (H)     At risk for falling     Personal history of fall     Anxiety     Hyperlipidemia with target LDL less than 160     Chronic hepatitis C (H)     Iron deficiency anemia due to chronic blood loss     Hemorrhage of gastrointestinal tract     Benign neoplasm of colon     Diverticulosis of large intestine     Hemorrhoids with complication     Hiatal hernia     Alcohol dependence in remission (H)     Morbid obesity (H)     Thrombocytopenia (H)     Diabetes mellitus, type 2 (H)          Care Coordination Start Date: 5/22/2020   Frequency of Care Coordination: 2 - 3 weeks   Form Last Updated: 05/22/2020

## 2020-05-22 NOTE — LETTER
"    5/22/2020         RE: Romina Chavarria  [address] [place] [address]        Dear Colleague,    Thank you for referring your patient, Romina Chavarria, to the [facility]. Please see a copy of my visit note below.    Date Pre-B Post-B Pre-L Post-L Pre-D Post-D  HS/NOC   5/22/20 349           Patient has just been diagnosed and started checking blood sugar this morning. Patient took 1 metformin yesterday and is taking 1 now.      Romina Chavarria is a 74 year old female who is being evaluated via a billable telephone visit.      The patient has been notified of following:     \"This telephone visit will be conducted via a call between you and your physician/provider. We have found that certain health care needs can be provided without the need for a physical exam.  This service lets us provide the care you need with a short phone conversation.  If a prescription is necessary we can send it directly to your pharmacy.  If lab work is needed we can place an order for that and you can then stop by our lab to have the test done at a later time.    Telephone visits are billed at different rates depending on your insurance coverage. During this emergency period, for some insurers they may be billed the same as an in-person visit.  Please reach out to your insurance provider with any questions.    If during the course of the call the physician/provider feels a telephone visit is not appropriate, you will not be charged for this service.\"    Patient has given verbal consent for Telephone visit?  Yes    What phone number would you like to be contacted at? [phone]    How would you like to obtain your AVS? Lazaro    Phone call duration: 35 minutes  Alyce Salazar MD  Endocrinology and Diabetes    Pager [phone]      DIABETES New Visit  Romina Chavarria    MRN: [id number]    1945  74 year old   female       [facility]    Romina Chavarria is a 74 year old female who is " "being evaluated via a billable telephone visit.      The patient has been notified of following:     \"This telephone visit will be conducted via a call between you and your physician/provider. We have found that certain health care needs can be provided without the need for an in-person physical exam.  This service lets us provide the care you need with a telephone conversation.  If a prescription is necessary we can send it directly to your pharmacy.  If lab work is needed we can place an order for that and you can then stop by our lab to have the test done at a later time.    If during the course of the call the physician/provider feels a telephone visit is not appropriate, you will not be charged for this service.\"     Patient has given verbal consent for Telephone visit? Yes    Telephone Start Time: 11:15AM    Romina Chavarria complains of    Chief Complaint   Patient presents with     Consult     Diabetes     new diagnosis       I have reviewed and updated the patient's Past Medical History, Social History, Family History and Medication List.    ALLERGIES  No known drug allergies      Telephone-Visit Details    Type of service:  Telephone Visit      Distant Location (provider location):  [facility]     Alyce Salazar MD  Endocrinology and Diabetes    Pager [phone]  Romina DUNG Deon complains of    Chief Complaint   Patient presents with     Consult     Diabetes     new diagnosis       I have reviewed and updated the patient's Past Medical History, Social History, Family History and Medication List.    ALLERGIES  No known drug allergies    .  HPI:  Romina is a 74-year-old woman with new diagnosis of diabetes with A1c of 12%.  The patient presents with involuntary 25 pound weight loss over the last 6 months due to decrease in appetite, urinary frequency and urgency.  She notes that she has been unsteady on her feet and dizzy and weak.  Over the last 3 days she changed her eating from " eating lots of candy and fruit to more carb restricted diet avoiding simple sugars.  She already feels much better in terms of energy, dizziness and polyuria.   She.started metformin 500 mg twice daily which she tolerates without nausea abdominal pain or diarrhea.    There is no prior history of glucose intolerance although it is not been checked recently.    Significant comorbidity of hepatitis C and EtOH abuse.  Patient reports not drinking in the last week however having had a couple of beer a month ago.  Is a longstanding problem, her  also struggles with alcohol abuse.    Diet: patient typically eats late breakfast and supper, might skip meals,  as stated above switched to healthier snacks.  Breakfast now is a Greek yogurt and sweetened grapefruit, dinner the last 2 nights with fish and vegetables.  Her  does the cooking  Physical activity quite limited especially recently due to the dizziness.      Eyes denies blurriness, has establish care with ophthalmologist who she intends to see soon    Feet fine-occasional swelling of the right leg, no toenail thickening according to the patient, no ulcerations, no numbness, no tingling    Bone health: patient fractured her left arm falling down the stairs tripping over her cat around age 64, prior do not bone density shows osteopenia     FH There is no family history of diabetes    Patient supposed to follow-up with the diabetes educator soon and might have received calls while we were on the phone    Cardiovascular history: no myocardial infarction, no stent placemnt , no stroke      Review of Systems:    Complete 10 point review of systems in addition to stated above is notable for hair loss     PMH:  Patient Active Problem List   Diagnosis     Hepatitis in viral disease     Chronic hepatitis C virus infection (H)     Lumbago     Disorder of bone and cartilage     Generalized osteoarthrosis, unspecified site     Neoplasm of unspecified nature of other  specified sites     Blood transfusion without reported diagnosis     ABO incompatibility reaction due to transfusion of blood or blood products     Alcohol abuse     Cataract     Venous stasis dermatitis     Venous stasis of lower extremity     Advanced directives, counseling/discussion     Alcoholic cirrhosis of liver (H)     At risk for falling     Personal history of fall     Anxiety     Hyperlipidemia with target LDL less than 160     Chronic hepatitis C (H)     Iron deficiency anemia due to chronic blood loss     Hemorrhage of gastrointestinal tract     Benign neoplasm of colon     Diverticulosis of large intestine     Hemorrhoids with complication     Hiatal hernia     Alcohol dependence in remission (H)     Morbid obesity (H)     Thrombocytopenia (H)     Diabetes mellitus, type 2 (H)       SOCIAL HISTORY:  Social History     Socioeconomic History     Marital status:      Spouse name: Neal     Number of children: 2     Years of education: 12     Highest education level: Not on file   Occupational History     Occupation: Disability     Employer: [employer]     Comment: former    Social Needs     Financial resource strain: Not on file     Food insecurity     Worry: Not on file     Inability: Not on file     Transportation needs     Medical: Not on file     Non-medical: Not on file   Tobacco Use     Smoking status: Never Smoker     Smokeless tobacco: Never Used     Tobacco comment: no passive exposure at home   Substance and Sexual Activity     Alcohol use: No     Alcohol/week: 0.0 standard drinks     Drug use: No     Sexual activity: Yes     Partners: Male     Birth control/protection: Surgical     Comment: not currently active   Lifestyle     Physical activity     Days per week: Not on file     Minutes per session: Not on file     Stress: Not on file   Relationships     Social connections     Talks on phone: Not on file     Gets together: Not on file     Attends Scientologist service: Not on file      Active member of club or organization: Not on file     Attends meetings of clubs or organizations: Not on file     Relationship status: Not on file     Intimate partner violence     Fear of current or ex partner: Not on file     Emotionally abused: Not on file     Physically abused: Not on file     Forced sexual activity: Not on file   Other Topics Concern      Service Not Asked     Blood Transfusions Yes     Comment: 1985- Hep C as a result     Caffeine Concern Not Asked     Occupational Exposure No     Hobby Hazards Not Asked     Sleep Concern Not Asked     Stress Concern Not Asked     Weight Concern Yes     Comment: Fluctuates     Special Diet Yes     Back Care Not Asked     Exercise Yes     Bike Helmet Not Asked     Seat Belt Yes     Self-Exams No     Parent/sibling w/ CABG, MI or angioplasty before 65F 55M? No   Social History Narrative     Not on file       FAMILY HISTORY:  History of Diabetes mellitus in the family: no    Medications:   Current Outpatient Medications   Medication Sig Dispense Refill     blood glucose (NO BRAND SPECIFIED) lancets standard Use to test blood sugar 3 times daily or as directed. 100 each 0     blood glucose (NO BRAND SPECIFIED) test strip Use to test blood sugar 3 times daily or as directed. 100 strip 0     blood glucose monitoring (NO BRAND SPECIFIED) meter device kit Use to test blood sugar 3 times daily or as directed. 1 kit 0     ferrous gluconate (FERGON) 324 (38 Fe) MG tablet Take 1 tablet (324 mg) by mouth 2 times daily (with meals) 60 tablet 0     furosemide (LASIX) 40 MG tablet Take 1 tablet (40 mg) by mouth daily 90 tablet 3     hydrOXYzine (ATARAX) 25 MG tablet Take 1-2 tablets (25-50 mg) by mouth every 6 hours as needed for itching Do not take while driving or operating machinery due to sedation. 60 tablet 1     loratadine (CLARITIN) 10 MG tablet Take 1 tablet by mouth daily.       metFORMIN (GLUCOPHAGE) 500 MG tablet Start with 1 tablet daily with  breakfast for 1 week then increase to twice daily with meals. 60 tablet 0     Omega-3 Fatty Acids (FISH OIL TRIPLE STRENGTH) 1400 MG CAPS Take 1 capsule by mouth once daily. Indications: CARDIAC DISEASE       potassium chloride ER (KLOR-CON) 20 MEQ CR tablet Take 1 tablet (20 mEq) by mouth 2 times daily 60 tablet 0     sertraline (ZOLOFT) 100 MG tablet Take 1 tablet (100 mg) by mouth daily 30 tablet 0     spironolactone (ALDACTONE) 50 MG tablet Take 1 tablet (50 mg) by mouth daily 30 tablet 0     traZODone (DESYREL) 50 MG tablet Take 1 tablet (50 mg) by mouth At Bedtime 30 tablet 1     Coenzyme Q10 (CO Q 10 PO) Take 1 capsule by mouth daily       UNABLE TO FIND MEDICATION NAME: Liver Dane - [employer] by Kevon Martínez [employer]  Milk Thistle, Artichoke, Tumeric         PHYSICAL EXAM:   There were no vitals taken for this visit.  Weight 155 lbs today according to pt     Reported vitals:  There were no vitals taken for this visit.   healthy, alert and no distress  PSYCH: Alert and oriented times 3; coherent speech, normal   rate and volume, able to articulate logical thoughts, able   to abstract reason, no tangential thoughts, no hallucinations   or delusions  Her affect is normal and pleasant  RESP: No cough, no audible wheezing, able to talk in full sentences  Remainder of exam unable to be completed due to telephone visits       LABS:    Lab Results   Component Value Date     (L) 05/14/2020    CHLORIDE 94 05/14/2020    CO2 23 05/14/2020     (HH) 05/19/2020    CR 0.55 05/14/2020    CR 0.84 10/31/2018    CR 0.78 02/09/2018    CR 0.75 10/24/2017    CR 0.77 05/23/2017    TOYA 8.5 05/14/2020    MAG 2.3 12/14/2003    ALBUMIN 3.1 (L) 05/14/2020    ALKPHOS 113 05/14/2020    LDL 51 05/18/2020    HDL 78 05/18/2020    TRIG 76 05/18/2020     Lab Results   Component Value Date    MICROL 23 05/19/2020    MICROL 14 11/20/2007     Lab Results   Component Value Date    A1C 12.9 (H) 05/19/2020    A1C 5.1  02/12/2014    A1C 5.6 05/11/2009    A1C 5.7 02/25/2008       Lab Results   Component Value Date    HGB 13.8 05/14/2020         ASSESSMENT/plan  Middle-aged woman with with newly diagnosed diabetes presenting with weight loss anorexia and polyuria with comorbidity of chronic hepatitis with hepatitis C and history of alcohol abuse.     It is unclear whether this patient is type I or type IIb.  At her prior weight she was obese.  However she does not have a family history of diabetes, prior A1c up to age 69 were normal, her lipid profile is completely normal.  Therefore I would like to check a IDALIA antibody.    Considering her A1c high A1c I would like to start a second agent in addition to the metformin. I would not start this patient on a sulfonylurea medication due to irregular food intake and history of alcoholism with high risk of hypoglycemia on this medication.  Reluctant to start a DPP 4 inhibitor like sitagliptin due to EtOH abuse in the past and risk of pancreatitis.  The patient will start SGLT2 inhibitor Jardiance or which ever is covered by insurance .   Tthe patient already made quite impressive changes to her diet and seems to feel better.    She denies symptoms in her feet.  Needs to  see her ophthalmologist soon.    Would recheck microalbumin once blood glucose is under better control    Patient should also have repeat of her bone density once more settled this diabetes management especially considering her significant weight loss which we know was associated with increased risk for fracture of the age of 71     PLAN:  Patient to contact the clinic if blood glucose (sugar) is under 70 two times in one week or above 200 for 3 consecutive days.       Patient was advised to bring blood glucose meter, supplies and medications with to all clinic visits.     Alyce Salazar MD  Endocrinology and Diabetes    Pager [phone]      Lab Results   Component Value Date    A1C 12.9 05/19/2020    A1C  5.1 02/12/2014    A1C 5.6 05/11/2009    A1C 5.7 02/25/2008     Lab Results   Component Value Date     (L) 05/14/2020    CHLORIDE 94 05/14/2020    CO2 23 05/14/2020     (HH) 05/19/2020    CR 0.55 05/14/2020    CR 0.84 10/31/2018    CR 0.78 02/09/2018    CR 0.75 10/24/2017    CR 0.77 05/23/2017    TOYA 8.5 05/14/2020    MAG 2.3 12/14/2003    ALBUMIN 3.1 (L) 05/14/2020    ALKPHOS 113 05/14/2020    LDL 51 05/18/2020    HDL 78 05/18/2020    TRIG 76 05/18/2020     Lab Results   Component Value Date    MICROL 23 05/19/2020    MICROL 14 11/20/2007     Lab Results   Component Value Date    A1C 12.9 (H) 05/19/2020    A1C 5.1 02/12/2014    A1C 5.6 05/11/2009    A1C 5.7 02/25/2008       Lab Results   Component Value Date    HGB 13.8 05/14/2020             Again, thank you for allowing me to participate in the care of your patient.        Sincerely,        Alyce Salazar MD

## 2020-05-22 NOTE — PROGRESS NOTES
Clinic Care Coordination Contact    Clinic Care Coordination Contact  OUTREACH    Referral Information:  Referral Source: Care Team    Primary Diagnosis: Diabetes    Chief Complaint   Patient presents with     Care Team        [place] Utilization:   Clinic Utilization  Difficulty keeping appointments:: No  Compliance Concerns: No  No-Show Concerns: No  No PCP office visit in Past Year: No  Utilization    Last refreshed: 5/22/2020  2:01 PM:  Hospital Admissions 0           Last refreshed: 5/22/2020  2:01 PM:  ED Visits 0           Last refreshed: 5/22/2020  2:01 PM:  No Show Count (past year) 0              Current as of: 5/22/2020  2:01 PM            Clinical Concerns:  Current Medical Concerns:  Care team referral.  Complex Medical Concerns/Education: Chronic diagnosis liver cirrhosis, Compliance with medical treatment plan and New diagnosis DM type 2. Would like to see if patient qualifies for any skilled nursing, OT at home, physical therapy.   Called patient introduced self and role. Patient agreeable to care coordination. She declined [facility] services. Denies wanting any resources for ETOH abuse or sobriety resources. Declined MTM referral. We reviewed her AVS from both PCP visit 5/20, and AVS from endo today. Patient took notes and verbalized understanding. She will log into [employer] for a most up to date med list to print out, and to review her patient instructions from her 2 recent PCP and endo appointments.  We discussed her upcoming appointments. CC RN stressed importance of checking blood sugars and recording them per PCP's advise. Patient will also follow the parameters that endo provided for reporting blood sugars to clinic. Patient advised to call the clinic with any new or sudden changes. Patient needs to call her eye doctor to make appointment, patient is an established patient with an eye doctor. Discussed foot cares.   Current Behavioral Concerns: none  Education Provided to patient: RN  CC educated about Care Coordination Services, discharge instructions, medications reviewed and follow up   Pain  Pain (GOAL):: No  Health Maintenance Reviewed: Not assessed  Clinical Pathway: Clinic Care Coordination - Diabetes Pathway    Patient's diabetes management related comments/concerns: new diagnosis this past week.    Patient's emotional response to diabetes: expresses readiness to learn, concern for health and well-being and confidence diabetes can be controlled    ASSESSMENT:  Symptoms:  Do you have:  Symptoms  Fatigue: Yes  Neuropathy: No  Polydipsia: Sometimes  Polyphagia: Sometimes  Polyuria: Sometimes  Visual change: Sometimes  Slow healing wounds: Yes    Patient Problem List and Family Medical History reviewed for relevant medical history, current medical status, and diabetes risk factors.    Current Diabetes Management per Patient:  Taking diabetes medications? Oral Medications: see med list. Patient was prescribed empagliflozin (JARDIANCE) 10 MG TABS today and will  at pharmacy.   BG results: not available, patient has a new glucometer, but got an error reading. She will call the manufacture and get machine working properly.   Patient's most recent   Lab Results   Component Value Date    A1C 12.9 05/19/2020       Nutrition:  How often do you meal plan? Avoiding sweets  Do you visit a dietician? No     Type 2  What is the duration of your diabetes?    What is the state of your diabetes? Other(new Dx)  Have you received Diabetes education in the past 24 months? No    How often do you do home blood tests? Patient advised to check 3x daily per 5/20 OV with PCP    Medication Management:  Patient independent in medication management and verbalizes adherence and understanding of medication regimen.  She will consider filling a weekly pill box, and per above documentation.     Functional Status:  Dependent ADLs:: Independent  Dependent IADLs:: Money Management, Shopping  Bed or wheelchair confined::  No  Mobility Status: Independent  Fallen 2 or more times in the past year?: No  Any fall with injury in the past year?: No    Living Situation:  Current living arrangement:: I live in a private home with spouse  Type of residence:: Private home - stairs    Lifestyle & Psychosocial Needs:     Social Needs     Financial resource strain: Not hard at all     Food insecurity     Worry: Never true     Inability: Never true     Transportation needs     Medical: No     Non-medical: No     Diet:: Diabetic diet  Inadequate nutrition (GOAL):: No  Tube Feeding: No  Inadequate activity/exercise (GOAL):: No  Significant changes in sleep pattern (GOAL): No  Transportation means:: Regular car     Yarsanism or spiritual beliefs that impact treatment:: No  Mental health DX:: No  Mental health management concern (GOAL):: No  Informal Support system:: Children   Socioeconomic History     Marital status:      Spouse name: Neal     Number of children: 2     Years of education: 12     Highest education level: Not on file   Occupational History     Occupation: Disability     Employer: [employer]     Comment: former      Tobacco Use     Smoking status: Never Smoker     Smokeless tobacco: Never Used     Tobacco comment: no passive exposure at home   Substance and Sexual Activity     Alcohol use: No     Alcohol/week: 0.0 standard drinks     Drug use: No     Sexual activity: Yes     Partners: Male     Birth control/protection: Surgical     Comment: not currently active        Resources and Interventions:  Current Resources: PCP, endo, DM ed, CC RN, care team, hepatologist.    Community Resources: None  Supplies used at home:: Diabetic Supplies  Equipment Currently Used at Home: glucometer, grab bar, toilet, grab bar, tub/shower, shower chair    Advance Care Plan/Directive  Advanced Care Plans/Directives on file:: No  Advanced Care Plan/Directive Status: Considering Options    Referrals Placed: [facility] [phone]      Goals:   Goals        General    Medical (pt-stated)     Notes - Note edited  5/22/2020  2:47 PM by Marielos iMller RN    Goal Statement: I want to achieve an A1C of < 7.0  Date goal set: 5/22/20  Measure of Success: Patient's A1C will be less than or equal to 7.0   Barriers: new diagnosis for patient  Strengths: Motivated to engage in Clinic Care Coordination; willing to implement home strategies for improved glucose control  Date to Achieve By: 2-3 week, also ongoing.   Patient expressed understanding of goal: Patient participated in goal setting and supportive steps to achieve    Action steps to achieve this goal  1. I will follow a diabetic friendly diet, referring to the education I received at my endocrinologist appointment today via [employer].  2. I will discuss medication management strategies with my primary care provider, endocrinologist, and diabetic education nurse  4. I will participate in regular Clinic Care Coordination outreaches for ongoing support, resources, and education        Other (pt-stated)     Notes - Note created  5/22/2020  2:44 PM by Marielos Miller RN    Goal Statement: I do not want to have another fall  Date Goal set: 5/22/20  Barriers: patient fell last week while walking in the dark at night.   Strengths: patient stated goal  Date to Achieve By: 1 month  Patient expressed understanding of goal: yes  Action steps to achieve this goal:  -Remove trip hazards to keep pathways clear in the home  -Remove throw rugs  -Keep frequently used items in easy to reach places  -Use non-slip mats in the bathtub and on shower floors  -Improve lighting in your home in all areas  -Wear shoes or non-slip footwear inside the house   -I will follow the treatment plans as advised by my medical providers              Patient/Caregiver understanding: yes    Outreach Frequency: 2 weeks  Future Appointments              In 1 week  DIABETIC ED RESOURCE [facility] [place],  [facility]    In 1 week [facility]    In 1 week Liz Richard APRN [facility], [facility]    In 1 month Alyce Salazar MD [facility]    In 1 month [facility]          Plan: 1. Patient will follow discharge summary.   2. Patient will follow up sooner should symptoms worsen, change or patient feels there is a need further care.  3. Care Coordination needs identified at this time. Patient enrolled in Care Coordination.   4. Complex care plan and letter sent.   5. CC RN will reach out to patient in 2-3 weeks, if additional needs arise patient encouraged to contact Care Coordinator sooner.     BRETT HidalgoN RN Clinic Care Coordinator   [place], [place], [place]  Phone: [phone]

## 2020-05-26 NOTE — TELEPHONE ENCOUNTER
Please let patient know that I spoke with care coordination. I am glad she is working with them and that she met with endocrinology. I am out of the office this week, however, I did put in a home care referral for her and forgot to mention this last week. I would like her to have some evaluations to see if she would benefit from some home care services such as physical therapy, occupational therapy or weekly nurse visits for medication compliance etc. Her case will be reviewed and they will contact her to give her information in regards to their services. I encourage her to give this a chance and hear out what her options are and if she qualifies.       MELE Howard CNP  Questions or concerns please feel free to send me a [employer] message or call me  Phone : [phone]

## 2020-05-26 NOTE — TELEPHONE ENCOUNTER
"Clinic Action Needed: Medication question    FNA Triage Call  Presenting Problem:  Patient reports the cost of the medication -empagliflozin (JARDIANCE) 10 MG TABS tablet is 482 dollars out of pocket. Patient states she can't afford this amount of money. Patient wondering if there is any alternative medication. Provider who prescribed the medication is Dr Salazar at [facility]    Routed to: [facility]     Poppy Concepcion, RN/M [facility] Nurse Advisors      Reason for Disposition    Caller has NON-URGENT medication question about med that PCP prescribed and triager unable to answer question    Additional Information    Negative: Drug overdose and nurse unable to answer question    Negative: Caller requesting information not related to medicine    Negative: Caller requesting a prescription for Strep throat and has a positive culture result    Negative: Rash while taking a medication or within 3 days of stopping it    Negative: Immunization reaction suspected    Negative: [1] Asthma AND [2] having symptoms of asthma (cough, wheezing, etc)    Negative: MORE THAN A DOUBLE DOSE of a prescription or over-the-counter (OTC) drug    Negative: [1] DOUBLE DOSE (an extra dose or lesser amount) of over-the-counter (OTC) drug AND [2] any symptoms (e.g., dizziness, nausea, pain, sleepiness)    Negative: [1] DOUBLE DOSE (an extra dose or lesser amount) of prescription drug AND [2] any symptoms (e.g., dizziness, nausea, pain, sleepiness)    Negative: Took another person's prescription drug    Negative: [1] DOUBLE DOSE (an extra dose or lesser amount) of prescription drug AND [2] NO symptoms (Exception: a double dose of antibiotics)    Negative: Diabetes drug error or overdose (e.g., insulin or extra dose)    Negative: [1] Request for URGENT new prescription or refill of \"essential\" medication (i.e., likelihood of harm to patient if not taken) AND [2] triager unable to fill per unit policy    Negative: [1] " Prescription not at pharmacy AND [2] was prescribed today by PCP    Negative: Pharmacy calling with prescription questions and triager unable to answer question    Negative: Caller has urgent medication question about med that PCP prescribed and triager unable to answer question    Protocols used: MEDICATION QUESTION CALL-A-AH

## 2020-05-26 NOTE — LETTER
[facility]  [address]  [place] [address]  [phone]      May 27, 2020    Romina Chavarria                                                                                                                     [address] [place] [address]        Dear Romina,    We tried to call you to inform you of a message from your Provider.  She states:  I spoke with care coordination. I am glad you are working with them and that you met with endocrinology. I am out of the office this week, however, I did put in a home care referral for you and forgot to mention this last week. I would like you to have some evaluations to see if you would benefit from some home care services such as physical therapy, occupational therapy or weekly nurse visits for medication compliance etc. Your case will be reviewed and they will contact  You to give you information in regards to their services. I encourage you to give this a chance and hear out what your options are and if you qualify.       MELE Howard CNP  Questions or concerns please feel free to send me a [employer] message or call me  Phone : [phone].      Thanks,  M [facility] Support Staff / [place]

## 2020-05-26 NOTE — PROGRESS NOTES
Clinic Care Coordination Contact    Patient called May 22nd at 4:09 pm and left a VM, stated she went to pharmacy to  Empagliflozin  Jardiance, but cost was over $500/30 pills. Therefore she didn't pick it up.     Resources to give to patient:  [facility] pharmacy assistance Phone: [phone]  Good Rx  [facility] pharmacist   [email or web address]-[email or web address]/ (manufacture coupon)  Or send message back to prescriber /care team.    Left message for patient to call back on their VM.   Writers direct line given, [phone].    BRETT HidalgoN RN Clinic Care Coordinator   [place], [place], [place]  Phone: [phone]

## 2020-05-27 NOTE — TELEPHONE ENCOUNTER
Will send to Dr. Salazar to review patient message regarding cost of Jardiance and possible alternative.       Chen Fernandez RN  Endocrine Care Coordinator  [facility]

## 2020-05-27 NOTE — PROGRESS NOTES
Clinic Care Coordination Contact    Connected with patient, gave her the resources below. She is also going to see diabetic eduction this Friday. She will look in to these resources and follow up with diabetic ed this Friday.  Patient got her glucometer working at home. She is reporting readings over 200 for 3 consecutive days. CC RN advised that per her endocrine appointment she was to call her endo clinic at [facility]. Patient will call [phone].     Plan: CC RN will follow up with patient in 2 weeks.     REGGIE Hidalgo RN Clinic Care Coordinator   [place], [place], [place]  Phone: [phone]

## 2020-05-27 NOTE — TELEPHONE ENCOUNTER
Patient called clinic back. I relayed KV's message from below. She stated that with the Jardiance it was $500 and she cannot afford this. She was told that someone would get back with her about affording this or giving her a generic or different medication. Please f/u about the medication portion, otherwise she will still await call for home care referral.

## 2020-05-27 NOTE — PROGRESS NOTES
Dear Dr. Ambriz,     We received a Home Care Referral for your patient, Romina Chavarria; MRN [id number]     Unfortunately, due to high patient volumes and decreased staffing availability, we were unable to provide the care this patient needed within the 48 hours allotted by Medicare.  We have vended her care to [facility]  [phone] on 5/26/20.    We sincerely apologize for any inconvenience this has caused.    [facility] Intake Team  Kelley Howard  [phone]

## 2020-05-28 NOTE — TELEPHONE ENCOUNTER
As documented in previous message Jardiance is too expensive for patient. Due to Medicare insurance patient will not qualify for savings card/voucher.    Will forward to Dr. Salazar.    Francie Lynch LPN  Diabetes Clinic Coordinator   Adult Endocrinology and Pediatric Specialty Clinics  [facility]

## 2020-05-29 NOTE — PROGRESS NOTES
"Two attempts to reach patient to [facility] for phone visit at 10:30am - no answer, left messge. Will try again in a few minutes    Unable to reach patient x3 - provider may try to contact patient for phone visit scheduled at 10:30 today.    Romina Chavarria is a 74 year old female who is being evaluated via a billable telephone visit.      The patient has been notified of following:     \"This telephone visit will be conducted via a call between you and your physician/provider. We have found that certain health care needs can be provided without the need for a physical exam.  This service lets us provide the care you need with a short phone conversation.  If a prescription is necessary we can send it directly to your pharmacy.  If lab work is needed we can place an order for that and you can then stop by our lab to have the test done at a later time.    Telephone visits are billed at different rates depending on your insurance coverage. During this emergency period, for some insurers they may be billed the same as an in-person visit.  Please reach out to your insurance provider with any questions.    If during the course of the call the physician/provider feels a telephone visit is not appropriate, you will not be charged for this service.\"    Patient has given verbal consent for Telephone visit?  Yes    What phone number would you like to be contacted at? [phone]    How would you like to obtain your AVS?     UNABLE TO REACH PATIENT          "

## 2020-05-29 NOTE — Clinical Note
FYI, her reported blood sugars all > 300 and 2 that were > 500 yesterday (after her ice cream cone which was chocolate covered).  She told me she was just taking 1 metformin per day so I told her to increase to 2 per day and then continue to increase by 1 tablet each week to 4 per day (as ordered). I see Januvia was ordered instead of Jardiance so I updated her on this. Told her to my chart me her BG's in 2 weeks to review them and made a follow up in 1 month.   Krista Russ, RD LD CDE

## 2020-05-29 NOTE — PROGRESS NOTES
Will plan to reach out to patient in 1-2 weeks if she does not update me sooner to see how her BG's are doing. Had told pt to let us know asap if Januvia is too expensive too.     Krista Russ, RD LD CDE

## 2020-05-29 NOTE — PROGRESS NOTES
Diabetes Self-Management Education & Support    Presents for: Initial Assessment for new diagnosis over the phone    Patient verbally consented to the telephone visit service today: yes        SUBJECTIVE/OBJECTIVE:  Presents for: Initial Assessment for new diagnosis  Diabetes education in the past 24mo: No  Diabetes type: Type 2  Date of diagnosis: new dx  Disease course: Worsening  Diabetes management related comments/concerns: I just found out I had it!  I probably had it for 1-3 yrs. now that I know the symptoms. I had many blood samples but the doctors never mentioned it.  We have been eating sweets and lots of candy. I didn't we had diabetes on either side of our families!  Cultural Influences/Ethnic Background:  American    Diabetes Symptoms & Complications:  Fatigue: Yes  Neuropathy: No  Polydipsia: Yes  Polyphagia: Sometimes  Polyuria: Yes  Visual change: No  Slow healing wounds: Yes  Symptom course: Improving  Weight trend: Decreasing  Complications assessed today?: Yes  Autonomic neuropathy: No  CVA: No  Heart disease: No  Nephropathy: Other  Peripheral neuropathy: No  Peripheral Vascular Disease: Yes  Retinopathy: No    Patient Problem List and Family Medical History reviewed for relevant medical history, current medical status, and diabetes risk factors.    Vitals:  There were no vitals taken for this visit.  Estimated body mass index is 34.41 kg/m  as calculated from the following:    Height as of 11/7/18: 1.524 m (5').    Weight as of 11/7/18: 79.9 kg (176 lb 3.2 oz).   Last 3 BP:   BP Readings from Last 3 Encounters:   05/18/20 127/89   11/07/18 133/81   05/16/18 136/78       History   Smoking Status     Never Smoker   Smokeless Tobacco     Never Used     Comment: no passive exposure at home       Labs:  Lab Results   Component Value Date    A1C 12.9 05/19/2020     Lab Results   Component Value Date     05/19/2020     Lab Results   Component Value Date    LDL 51 05/18/2020     HDL  Cholesterol   Date Value Ref Range Status   05/18/2020 78 >49 mg/dL Final   ]  GFR Estimate   Date Value Ref Range Status   05/14/2020 >90 >60 mL/min/[1.73_m2] Final     Comment:     Non  GFR Calc  Starting 12/18/2018, serum creatinine based estimated GFR (eGFR) will be   calculated using the Chronic Kidney Disease Epidemiology Collaboration   (CKD-EPI) equation.       GFR Estimate If Black   Date Value Ref Range Status   05/14/2020 >90 >60 mL/min/[1.73_m2] Final     Comment:      GFR Calc  Starting 12/18/2018, serum creatinine based estimated GFR (eGFR) will be   calculated using the Chronic Kidney Disease Epidemiology Collaboration   (CKD-EPI) equation.       Lab Results   Component Value Date    CR 0.55 05/14/2020     No results found for: MICROALBUMIN    Healthy Eating:  Healthy Eating Assessed Today: Yes  Cultural/Baptist diet restrictions?: No  Meal planning/habits: Avoiding sweets, Smaller portions, Keeps food records, Frequent snacking  How many times a week on average do you eat food made away from home (restaurant/take-out)?: 2  Meals include: Breakfast, Lunch, Dinner, Afternoon Snack, Evening Snack  Breakfast: eggs with hayden and peppers OR cheerios with milk and truvia and 1/2 apple  Lunch: chicken and greens and potato wedge with berries adn jeff food cake  Dinner: chicken sandwich and strawberries and cake with whip cream OR hamburger with cheese and onion/lettuce/tomato  Snacks: popcorn  Beverages: Water, Milk, Juice, Diet soda, Other  Drinking ICE water.  Has been recording her food intake.     Being Active:  Being Active Assessed Today: Yes  Exercise:: Currently not exercising(has exercise equipment at home)  Barrier to exercise: Time, Safety, Physical limitation    Monitoring:  Monitoring Assessed Today: Yes  Did patient bring glucose meter to appointment? : Yes  Times checking blood sugar at home (number): 2-3  Times checking blood sugar at home (per): Day  Blood  glucose trend: Fluctuating    401 this morning.       Date Breakfast  Lunch  Dinner  Bedtime    Before After Before After Before After    5/28 449  597 (had yogurt ice cream in a chocolate cone)  516     5/27 5/26 5/25 381  435       5/24 377    449     5/23 365  844  368         Taking Medications:  Diabetes Medication(s)     Biguanides       metFORMIN (GLUCOPHAGE) 500 MG tablet    Start 2 tablet daily with breakfast and 2 tablets with dinner    Dipeptidyl Peptidase-4 (DPP-4) Inhibitors       sitagliptin (JANUVIA) 100 MG tablet    Take 1 tablet (100 mg) by mouth daily      was ordered to start Jardiance but then this was not covered by her insurance per pt.  Appears her endo added an order for Januvia today.     Taking Medication Assessed Today: Yes  Current Treatments: Diet, Oral Medication (taken by mouth)    Problem Solving:   not assessed    Reducing Risks:  Reducing Risks Assessed Today: Yes  Diabetes Risks: Age over 45 years, Sedentary Lifestyle, Hyperlipidemia  CAD Risks: Sedentary lifestyle, Stress, Diabetes Mellitus, Obesity  Has dilated eye exam at least once a year?: No  Sees dentist every 6 months?: Yes  Feet checked by healthcare provider in the last year?: No    Healthy Coping:  Healthy Coping Assessed Today: Yes  Emotional response to diabetes: Ready to learn  Informal Support system:: Children, Karen based, Family, Friends, Spouse  Stage of change: PREPARATION (Decided to change - considering how)  Patient Activation Measure Survey Score:  ZEFERINO Score (Last Two) 5/10/2011   ZEFERINO Raw Score 36   Activation Score 47.4   ZEFERINO Level 2       Diabetes knowledge and skills assessment:   Patient is knowledgeable in diabetes management concepts related to: Monitoring    Patient needs further education on the following diabetes management concepts: Healthy Eating, Being Active, Monitoring, Taking Medication, Problem Solving, Reducing Risks and Healthy Coping    Based on learning assessment above,  most appropriate setting for further diabetes education would be: Group class or Individual setting.      INTERVENTIONS:    Education provided today on:  AADE Self-Care Behaviors:  Healthy Eating: consistency in amount, composition, and timing of food intake and portion control  Being Active: relationship to blood glucose and precautions to take  Monitoring: log and interpret results, individual blood glucose targets and frequency of monitoring  Taking Medication: action of prescribed medication and when to take medications  Problem Solving: high blood glucose - causes, signs/symptoms, treatment and prevention and when to call health care provider    Opportunities for ongoing education and support in diabetes-self management were discussed.    Pt verbalized understanding of concepts discussed and recommendations provided today.       Education Materials Provided:  [employer] Understanding Diabetes Booklet and My Plate Planner --> will be mailed      ASSESSMENT:  Confused easily during our phone call today.  Has reduced her candy intake and is drinking beverages that do not have sugar added which is good progress.  Per pt, she is only taking 1 Metformin tablet per day (though it appears she reported to endo on 4/22 that she takes 2/day). Ordered for her to have 4/day.  She would benefit from increasing this dose so educated her on increasing the dose by 1 tablet per week.    She had a big chocolate covered ice cream cone yesterday and then was > 500 after. Explained that until she has more medication and her numbers are improved, I would recommend she avoid sweets and ice cream, etc.  She then kept asking about cookies. Explained that I would avoid those too for right now.  Encouraged healthier foods like fruit, vegetables, nuts/seeds, lean proteins, etc.  Described the healthy plate over the phone and portion control for her carb containing foods. Encouraged whole grains.     Of note, future order for IDALIA to be  checked from endo. Has another endo follow up in 3 weeks.        Patient's most recent   Lab Results   Component Value Date    A1C 12.9 05/19/2020    is not meeting goal of <7.0    PLAN  See Patient Instructions for co-developed, patient-stated behavior change goals.  Increase metformin to 1 tablet at breakfast and 1 at dinner today. Continue 2 tabs per day for 1 week then increase to 3 tabs/d for 1 week, then 4 tabs per day.   Pt to go  her Januvia which was ordered today and begin this.   Continue to check BG daily and my chart me in 2 weeks with results.   Follow up scheduled in 1 month.   Avoid sweets like ice cream and cookies and cake.   AVS printed and provided to patient today. See Follow-Up section for recommended follow-up.    IVORY Gates CDE    Time Spent: 54: minutes  Encounter Type: Individual    Any diabetes medication dose changes were made via the CDE Protocol and Collaborative Practice Agreement with the patient's referring provider. A copy of this encounter was shared with the provider.

## 2020-05-29 NOTE — PATIENT INSTRUCTIONS
your new medication - Januvia. You will take 1 tablet per day.     Start taking 1 metformin at breakfast and 1 at dinner for 1 week. Then increase to 2 metformin at breakfast and 1 at dinner for 1 week. Then increase to 2 at breakfast and 2 at dinner.     Try to not have fruit with your cereal and milk in the morning. Can have an egg or nuts instead if still hungry.     Try to avoid any sweets.     My chart me in 2 weeks with your blood sugars.      Bring blood glucose meter and logbook with you to all doctor and follow-up appointments.    Diabetes Education Telephone Visit Follow-up:    We realize your time is valuable and your health is important! We offer a convenient Telephone Visit follow up! It s a quick way to check in for a medication dose adjustment without having to come back to clinic as soon.    Telephone Visits are often covered by insurance. Please check with your insurance plan to see if this type of visit is covered. If not, the cost is less expensive than an office visit:      Up to 10 minutes (Code 59801): $30    11-20 minutes (Code 71045): $59    More than 20 minutes (Code 59814): $85    Talk with your Diabetes Educator if you want to learn more.      [facility]:  For Your Diabetes Education and Nutrition Appointments Call:  [phone]   For Diabetes Education or Nutrition Related Questions:   Phone: [phone]  E-mail: [email or web address]  Fax: [phone]   If you need a medication refill please contact your pharmacy. Please allow 3 business days for your refills to be completed.

## 2020-05-29 NOTE — LETTER
"    5/29/2020         RE: Romina Chavarria  [address] [place] [address]        Dear Colleague,    Thank you for referring your patient, Romina Chavarria, to the [facility] HEPATOLOGY. Please see a copy of my visit note below.    Two attempts to reach patient to [facility] for phone visit at 10:30am - no answer, left messge. Will try again in a few minutes    Unable to reach patient x3 - provider may try to contact patient for phone visit scheduled at 10:30 today.    Romina Chavarria is a 74 year old female who is being evaluated via a billable telephone visit.      The patient has been notified of following:     \"This telephone visit will be conducted via a call between you and your physician/provider. We have found that certain health care needs can be provided without the need for a physical exam.  This service lets us provide the care you need with a short phone conversation.  If a prescription is necessary we can send it directly to your pharmacy.  If lab work is needed we can place an order for that and you can then stop by our lab to have the test done at a later time.    Telephone visits are billed at different rates depending on your insurance coverage. During this emergency period, for some insurers they may be billed the same as an in-person visit.  Please reach out to your insurance provider with any questions.    If during the course of the call the physician/provider feels a telephone visit is not appropriate, you will not be charged for this service.\"    Patient has given verbal consent for Telephone visit?  Yes    What phone number would you like to be contacted at? [phone]    How would you like to obtain your AVS?     UNABLE TO REACH PATIENT            Again, thank you for allowing me to participate in the care of your patient.        Sincerely,        José Antonio Reese MD    "

## 2020-06-04 NOTE — PROGRESS NOTES
"Romina Chavarria is a 74 year old female who is being evaluated via a billable telephone visit.      The patient has been notified of following:     \"This telephone visit will be conducted via a call between you and your physician/provider. We have found that certain health care needs can be provided without the need for a physical exam.  This service lets us provide the care you need with a short phone conversation.  If a prescription is necessary we can send it directly to your pharmacy.  If lab work is needed we can place an order for that and you can then stop by our lab to have the test done at a later time.    Telephone visits are billed at different rates depending on your insurance coverage. During this emergency period, for some insurers they may be billed the same as an in-person visit.  Please reach out to your insurance provider with any questions.    If during the course of the call the physician/provider feels a telephone visit is not appropriate, you will not be charged for this service.\"    Patient has given verbal consent for Telephone visit?  Yes    What phone number would you like to be contacted at?[phone]     How would you like to obtain your AVS? Lazaro Grider     Romina Chavarria is a 74 year old female who presents via phone visit today for the following health issues:    HPI     Diabetes Follow-up    How often are you checking your blood sugar? Two times daily  What time of day are you checking your blood sugars (select all that apply)?  Before and after meals  Have you had any blood sugars above 200?  Yes   Have you had any blood sugars below 70?  No    What symptoms do you notice when your blood sugar is low?  Dizzy    What concerns do you have today about your diabetes? None     Do you have any of these symptoms? (Select all that apply)  No numbness or tingling in feet.  No redness, sores or blisters on feet.  No complaints of excessive thirst.  No reports of blurry vision.  No " significant changes to weight.    Have you had a diabetic eye exam in the last 12 months? Yes    Friday: 313 & 289  Saturday: 246 & 243  Sunday:  234 & 159  Monday 214    She said things are going well. She fell outside. Right arm and her left arm. She was bending over and digging a hole to plant a pine tree. Fell forward and hit the tree in front of her. She got up and had some bleeding, this is healing well.     Her sugars are coming down. 159 was her lowest. That was at night after dinner.     Diabetic educator and endocrinology. She is not drinking and not eating sugar. She is doing well. She is to have some blood testing before her next appointment with endocrinology. Needs to make a new appointment with Dr. Reese.     Was not taking her metformin twice a day was doing one tablet three times daily.       BP Readings from Last 2 Encounters:   05/18/20 127/89   11/07/18 133/81     Hemoglobin A1C (%)   Date Value   05/19/2020 12.9 (H)   02/12/2014 5.1     LDL Cholesterol Calculated (mg/dL)   Date Value   05/18/2020 51   07/10/2013 106           How many servings of fruits and vegetables do you eat daily?  4 or more    On average, how many sweetened beverages do you drink each day (Examples: soda, juice, sweet tea, etc.  Do NOT count diet or artificially sweetened beverages)?   0    How many days per week do you exercise enough to make your heart beat faster? 3 or less    How many minutes a day do you exercise enough to make your heart beat faster? 10 - 19    How many days per week do you miss taking your medication? 0         Current Outpatient Medications   Medication Sig Dispense Refill     blood glucose (NO BRAND SPECIFIED) lancets standard Use to test blood sugar 3 times daily or as directed. 100 each 0     blood glucose (NO BRAND SPECIFIED) test strip Use to test blood sugar 3 times daily or as directed. 100 strip 0     blood glucose monitoring (NO BRAND SPECIFIED) meter device kit Use to test blood sugar 3  times daily or as directed. 1 kit 0     Coenzyme Q10 (CO Q 10 PO) Take 1 capsule by mouth daily       ferrous gluconate (FERGON) 324 (38 Fe) MG tablet Take 1 tablet (324 mg) by mouth 2 times daily (with meals) 60 tablet 0     furosemide (LASIX) 40 MG tablet Take 1 tablet (40 mg) by mouth daily 90 tablet 3     hydrOXYzine (ATARAX) 25 MG tablet Take 1-2 tablets (25-50 mg) by mouth every 6 hours as needed for itching Do not take while driving or operating machinery due to sedation. 60 tablet 1     loratadine (CLARITIN) 10 MG tablet Take 1 tablet by mouth daily.       metFORMIN (GLUCOPHAGE) 500 MG tablet Start 2 tablet daily with breakfast and 2 tablets with dinner 120 tablet 11     Omega-3 Fatty Acids (FISH OIL TRIPLE STRENGTH) 1400 MG CAPS Take 1 capsule by mouth once daily. Indications: CARDIAC DISEASE       potassium chloride ER (KLOR-CON) 20 MEQ CR tablet Take 1 tablet (20 mEq) by mouth 2 times daily 60 tablet 0     sertraline (ZOLOFT) 100 MG tablet Take 1 tablet (100 mg) by mouth daily 90 tablet 1     sitagliptin (JANUVIA) 100 MG tablet Take 1 tablet (100 mg) by mouth daily 90 tablet 3     spironolactone (ALDACTONE) 50 MG tablet Take 1 tablet (50 mg) by mouth daily 30 tablet 0     traZODone (DESYREL) 50 MG tablet Take 1 tablet (50 mg) by mouth At Bedtime 30 tablet 1       Reviewed and updated as needed this visit by Provider         Review of Systems   Constitutional: Negative.    Respiratory: Negative.    Cardiovascular: Negative.    Neurological: Negative.              Objective   Reported vitals:  There were no vitals taken for this visit.   healthy, alert and no distress  PSYCH: Alert and oriented times 3; coherent speech, normal   rate and volume, able to articulate logical thoughts, able   to abstract reason, no tangential thoughts, no hallucinations   or delusions  Her affect is normal  RESP: No cough, no audible wheezing, able to talk in full sentences  Remainder of exam unable to be completed due to  telephone visits    Diagnostic Test Results:  No results found for any visits on 06/08/20.        Assessment/Plan:  1. Alcohol dependence in remission (H)  - States that she continues to be sober. Continues to have these falls. I have put in a referral for home health. Discussed with care coordinator that patient does not want this. I would recommend that we do a OT referral to see if there are any cognitive deficits from her longstanding drinking history. I do recommend PT, OT and skilled nursing weekly given new diagnosis of diabetes, deconditioning and falls. Will continue to encourage this.   - OCCUPATIONAL THERAPY REFERRAL; Future    2. Hyperlipidemia with target LDL less than 160  - Continues on fish oil  - LDL 51 although risk factors for overall cardiovascular disease and stroke are high. Recommend we discuss this at follow up visit in 1 month to consider statin therapy. It would be definitely worth a discussion given her age.     The 10-year ASCVD risk score (Cristine GUERRERO Jr., et al., 2013) is: 21.7%    Values used to calculate the score:      Age: 74 years      Sex: Female      Is Non- : No      Diabetic: Yes      Tobacco smoker: No      Systolic Blood Pressure: 127 mmHg      Is BP treated: No      HDL Cholesterol: 78 mg/dL      Total Cholesterol: 144 mg/dL      3. Type 2 diabetes mellitus with hyperglycemia, without long-term current use of insulin (H)  - Continues on Metformin and Januvia. States that she was not aware she was to take the metformin twice daily two tablets. Although there is documentation that others have told her this including diabetic education, care coordination and endocrinology. Therefore I do feel she would benefit from some help with medication management weekly. I also advised care coordination of this compliance concern. Reassuring her numbers are gradually going down.  - Due for follow up with endocrinology in June and to have labs prior to visit.   -  OCCUPATIONAL THERAPY REFERRAL; Future    4. Anxiety    - sertraline (ZOLOFT) 100 MG tablet; Take 1 tablet (100 mg) by mouth daily  Dispense: 90 tablet; Refill: 1    5. Forgetfulness  - As noted above   - OCCUPATIONAL THERAPY REFERRAL; Future    6. Compliance poor  - As noted above  - Recommend continued encouragement of skilled nursing at home with home care along with continued help with care coordination.   - OCCUPATIONAL THERAPY REFERRAL; Future      Compliance and organization concerns  Easily flustered    Return in about 1 month (around 7/8/2020).      Phone call duration:  11  minutes    MELE Howard CNP

## 2020-06-04 NOTE — PROGRESS NOTES
"Clinic Care Coordination Contact    Follow Up Progress Note      Assessment: Patient stated she waited for the call from , but never received the call. Patient is agreeable to calling and rescheduling her appointment.   Discussed her 5/29 diabetic ed appointment. Patient did  the Januvia and is taking. It is very expensive at $528/3 month supply-but patient could afford it. Started on 5/29. Patient is going to send [facility] diabetic ed her blood sugar readings via [employer] tomorrow. Patient reporting they are around 300. Patient is taking metformin with meals BID, but was unaware of the gradual increase per AVS, this was discussed with patient again.  Patient is eating \"way more\" fruit and veggies compared to ice cream, and tristin bars. Patient reports having 2 falls while outside. Both occurred when she was bending over in the yard. Minor scratches on arms. Denies hitting head or LOC. Patient denies low blood sugar reading. Reading when she got inside was 292. Reports she was adequately hydrated as well. Patient denies the need for an appointment to assess. No active bleeding. Patient has PCP appointment Monday.     Goals addressed this encounter:   Goals Addressed                 This Visit's Progress       General      Medical (pt-stated)   On track     Goal Statement: I want to achieve an A1C of < 7.0  Date goal set: 5/22/20  Measure of Success: Patient's A1C will be less than or equal to 7.0   Barriers: new diagnosis for patient  Strengths: Motivated to engage in Clinic Care Coordination; willing to implement home strategies for improved glucose control  Date to Achieve By: 2-3 week, also ongoing.   Patient expressed understanding of goal: Patient participated in goal setting and supportive steps to achieve    Action steps to achieve this goal  1. I will follow a diabetic friendly diet, referring to the education I received at my endocrinologist appointment today via [employer].  2. I will discuss " medication management strategies with my primary care provider, endocrinologist, and diabetic education nurse  4. I will participate in regular Clinic Care Coordination outreaches for ongoing support, resources, and education          Other (pt-stated)   10%     Goal Statement: I do not want to have another fall  Date Goal set: 5/22/20  Barriers: patient fell last week while walking in the dark at night.   Strengths: patient stated goal  Date to Achieve By: 1 month  Patient expressed understanding of goal: yes  Action steps to achieve this goal:  -Remove trip hazards to keep pathways clear in the home  -Remove throw rugs  -Keep frequently used items in easy to reach places  -Use non-slip mats in the bathtub and on shower floors  -Improve lighting in your home in all areas  -Wear shoes or non-slip footwear inside the house   -I will follow the treatment plans as advised by my medical providers               Intervention/Education provided during outreach: CC RN advised patient can always call the diabetic ed triage line or send a mychart questions to her care team. Otherwise call CC RN or clinic directly for questions.      Plan:   Patient to follow current care plan and goals.   Care Coordinator will follow up in 1-2 weeks.   STEFFANIE Hill.  REGGIE Hidalgo RN Clinic Care Coordinator   [place], [place], [place]  Phone: [phone]

## 2020-06-08 NOTE — PROGRESS NOTES
I tried calling pt again this afternoon a couple times but no answer. Did not leave a vm. I will try again later this week and will send her a [employer] message today as well.      Of note, her reported BG's from her visit today:  Friday: 313 & 289  Saturday: 246 & 243  Sunday:  234 & 159  Monday 214    The last 2 readings from Sunday and Monday are improved and it is promising to see a number < 200 mg/dL.  Would benefit from seeing if they continue to improve the next couple days. Will ask her to message me her numbers in 2 days. Also, asking her if the Januvia cost is okay or if it is too expensive. Might need to change to insulin if cost of Januvia is too much to maintain.  Will route to endo as well.     Krista Russ, RD LD CDE

## 2020-06-09 NOTE — TELEPHONE ENCOUNTER
Prescription approved per [facility] Refill Protocol.  Jason Thomas, RN, BSN

## 2020-06-12 NOTE — PROGRESS NOTES
Attempted to call pt again today as she has not updated us on her BGs yet. No answer on her phone. Left her a vm to call us at  with her BG's from this week and also to let us know if that Januvia is too expensive.     Received the following message from her Endo today as well that she is on board with 10 unit(s) Lantus per day if needed for her blood sugars if they are still mostly > 200 mg/dL. Will route to her as FYI that I have not had luck reaching pt this week.     If pt is started on insulin she will need instruction on this as she has never used it before.         Krista Russ, IVORY LD CDE

## 2020-06-12 NOTE — TELEPHONE ENCOUNTER
I called patient and she is not out, she will wait for her mail order to come and call us if she has any issues.

## 2020-06-12 NOTE — TELEPHONE ENCOUNTER
Prior Authorization Retail Medication Request    Medication/Dose: hydrOXYzine (ATARAX) 25 MG tablet  ICD code (if different than what is on RX):    Previously Tried and Failed:    Rationale:      Insurance Name: Not listed on fax    Insurance ID:        Pharmacy Information (if different than what is on RX)  Name:    Phone:

## 2020-06-12 NOTE — TELEPHONE ENCOUNTER
These were sent 3 days ago to mail order. Please call and clarify if she needs them sent locally.    Jason Thomas RN, BSN

## 2020-06-15 NOTE — TELEPHONE ENCOUNTER
Diabetes Follow-up    Subjective/Objective:    Romina Chavarria sent in blood glucose log for review. Last date of communication was: 2020.    Diabetes is being managed with   Diabetes Medications   Diabetes Medication(s)     Biguanides       metFORMIN (GLUCOPHAGE) 500 MG tablet    Start 2 tablet daily with breakfast and 2 tablets with dinner    Dipeptidyl Peptidase-4 (DPP-4) Inhibitors       sitagliptin (JANUVIA) 100 MG tablet    Take 1 tablet (100 mg) by mouth daily          BG/Food Lo/1  6:46 am (301)   9:16pm (366)    TUE 6:56am (342)  9:49pm (270)   6/3 WED 6:04am (292) pm(362)     THUR   7:23am (313} 10:00pm  (375)   FRI 5  7:12am  (313)  9:39pm (287)   SAT 6  7:35am (246)  8:27pm (243)   SUN 7  7:37am ( 234)  8:03pm (159)   MON 8 5:39am  (214) 8:46pm (275)   TUE 9 7:03am (235)  7:45pm (229)   WED 10 9:03 (231)  10:02pm (194)  THUR 11  5:44 (176)  9:01pm (219)   FRI 12 6:46am (189) 9:15pm (182)   SAT 13 10:03 (180)  9:33 (146)   SUN 14  9:22am (169) 9:17pm (118)       Assessment:    At this time Romina is on Metformin  mg taking 2000 mg daily.  Is also on Januvia 100 mg daily,      There was some discussion regarding that Romina start Glargine insulin at 10 units daily .  At this time I would hesitate starting unless we discontinue Januvia., which seems to be working for her.   She could try Jardiance or a GLP-1 such as Ozempic if that is covered for her.  Will route this to Alysia Leal for some advise at this time.      Plan/Response:  See Patient Instructions for co-developed, patient-stated behavior change goals.  No changes in the patient's current treatment plan  Recommend referral to Endocrinology    Gi Shane RN/RADAMES [place] Diabetes Educator      Any diabetes medication dose changes were made via the CDE Protocol and Collaborative Practice Agreement with the patient's primary care provider and endocrinology provider. A copy of this encounter was shared with the provider.

## 2020-06-15 NOTE — TELEPHONE ENCOUNTER
Prior Authorization Approval    Authorization Effective Date: 3/17/2020  Authorization Expiration Date: 6/15/2021  Medication: hydrOXYzine (ATARAX) 25 MG tablet-APPROVED  Approved Dose/Quantity:   Reference #:     Insurance Company: Medicare Blue - Phone [phone] Fax [phone]  Expected CoPay:       CoPay Card Available:      Foundation Assistance Needed:    Which Pharmacy is filling the prescription (Not needed for infusion/clinic administered): [facility] - [place], [place] - [facility] [address] AT [address] TO [facility]  Pharmacy Notified: Yes  Patient Notified: No    Pharmacy will notify patient when medication is ready.

## 2020-06-15 NOTE — TELEPHONE ENCOUNTER
Thank you for letting me know.       MELE Howard CNP  Questions or concerns please feel free to send me a [employer] message or call me  Phone : [phone]

## 2020-06-15 NOTE — TELEPHONE ENCOUNTER
Hi  I just spoke to Romina and she said the Januvia was over 500.00.  she did buy the one month , but cannot afford the other months.  She is willing to try to use Ozempic, 0.5 mg weekly, starting out 0.25 mg weekly X 4 wks.      If you agree with this, I can order this for her and teach how to use,    Please let me know.    Gi Shane RN/CDE  [place] Diabetes Educator

## 2020-06-15 NOTE — TELEPHONE ENCOUNTER
Reason for Call:  Other Refusing Home Health    Detailed comments: Nicole called in today to let you know that patient is refusing Home Health at this time. Nicole stated that they have tried a few times.    Phone Number Patient can be reached at: Other phone number:  [phone]*    Best Time: any    Can we leave a detailed message on this number? YES    Call taken on 6/15/2020 at 1:02 PM by Sandra Webster

## 2020-06-15 NOTE — TELEPHONE ENCOUNTER
Central Prior Authorization Team   Phone: [phone]      PA Initiation    Medication: hydrOXYzine (ATARAX) 25 MG tablet-Initiated  Insurance Company: Medicare Blue - Phone [phone] Fax [phone]  Pharmacy Filling the Rx: [facility] - [place], [place] - [address] AT [address]AL TO REG[facility]ED [facility] [address]  Filling Pharmacy Phone: [phone]  Filling Pharmacy Fax:    Start Date: 6/15/2020

## 2020-06-16 NOTE — TELEPHONE ENCOUNTER
Pending Prescriptions:                       Disp   Refills    metFORMIN (GLUCOPHAGE) 500 MG tablet [Pha*60 tab*0            Sig: START WITH 1 TABLET BY MOUTH DAILY WITH BREAKFAST           FOR 1 WEEK THEN INCREASE TO TWICE DAILY WITH           MEALS    Sent 6/15/20 with 1 year supply. Refill not appropriate at this time.     Hamida Pradhan, BSN, RN, PHN

## 2020-06-18 NOTE — TELEPHONE ENCOUNTER
Prior Authorization Not Needed per Insurance    Medication: Hydroxyzine  Insurance Company: [employer] - Phone [phone] Fax [phone]  Expected CoPay:      Pharmacy Filling the Rx: [facility] - [place], [place] - [address] AT [address] TO [facility]  Pharmacy Notified: Yes  Patient Notified: No    The patient currently has access to the requested medication and a Prior Authorization is not needed for the patient/medication.  PA is active until 6/15/2021.

## 2020-06-18 NOTE — TELEPHONE ENCOUNTER
hydrOXYzine (ATARAX) 25 MG tablet 25-50 mg, EVERY 6 HOURS PRN 4 ordered         Summary: Take 1-2 tablets (25-50 mg) by mouth every 6 hours as needed for itching Do not take while driving or operating machinery due to sedation., Disp-60 tablet,R-4, E-Prescribe   Dose, Route, Frequency: 25-50 mg, Oral, EVERY 6 HOURS PRN

## 2020-06-18 NOTE — TELEPHONE ENCOUNTER
Diabetes Follow-up    Subjective/Objective:    Romina Chavarria sent in blood glucose log for review. Last date of communication was: 6/15/2020.    Diabetes is being managed with   Diabetes Medications   Diabetes Medication(s)     Biguanides       metFORMIN (GLUCOPHAGE) 500 MG tablet    Start 2 tablet daily with breakfast and 2 tablets with dinner    Dipeptidyl Peptidase-4 (DPP-4) Inhibitors       sitagliptin (JANUVIA) 100 MG tablet    Take 1 tablet (100 mg) by mouth daily          BG/Food Log:         Here are my readings this week-MON 15 (171) 9:27 am (135) 9:11pm NO METFORMIN on Monday Tue 16 (155) 7:43am  (168) 9:12pm  Wed 17 (185) 8:20am (191) 8:16pm   Thur 18  (174) 6:57am      Assessment:    Romina's numbers are stable , she is tolerating Metformin and Januvia.  She has 3 months of Januvia that she paid 600.00 for.    She does not want to take any injections at all, will check out pricing for her .  The price may have been due because of her deductible.  Unable to see the pricing for januvia refills at this time.  I did check out pricing of Jardiance 10 mg dose and that will run her 29.00 /month.        Plan/Response:  See Patient Instructions for co-developed, patient-stated behavior change goals.  Will send off the note to Dr. Salazar and Krista to consider Jardiance if Januvia is still too expensive.    Gi Sahne RN/RADAMES  [place] Diabetes Educator        Any diabetes medication dose changes were made via the CDE Protocol and Collaborative Practice Agreement with the patient's primary care provider and endocrinology provider. A copy of this encounter was shared with the provider.

## 2020-06-22 NOTE — PROGRESS NOTES
"Date Pre-B Post-B Pre-L Post-L Pre-D Post-D  HS/NOC   6/22 166         6/21 156     112    6/20 188         6/19 176     150    6/18 174         6/17 185     191    6/16 155     168        Romina Chavarria is a 74 year old female who is being evaluated via a billable telephone visit.      The patient has been notified of following:     \"This telephone visit will be conducted via a call between you and your physician/provider. We have found that certain health care needs can be provided without the need for a physical exam.  This service lets us provide the care you need with a short phone conversation.  If a prescription is necessary we can send it directly to your pharmacy.  If lab work is needed we can place an order for that and you can then stop by our lab to have the test done at a later time.    Telephone visits are billed at different rates depending on your insurance coverage. During this emergency period, for some insurers they may be billed the same as an in-person visit.  Please reach out to your insurance provider with any questions.    If during the course of the call the physician/provider feels a telephone visit is not appropriate, you will not be charged for this service.\"    Patient has given verbal consent for Telephone visit?  Yes    What phone number would you like to be contacted at? [phone]    [facility]       Interval history:    Romina Chavarria aged 74 year old years old woman is here for T2DM follow up  Medications Metformin 500 mg 2 tiwce daily Januvia 100 mg once daily  SMBG checks twice daily fasting in the 150s and after dinner 130s  Devices meter  Hypoglycemia none  Physical activity gardening, fish pond weeding    Has been eating tons of fruit, has an orange and an apple in the evening    Complications: no numbness no tingling, urine albumin elevated x 1  Last eye exam needs to make appointment  Feet no numbness no tingling    Cardiovascular events: blood pressure " low    Assessment:  Elderly woman with recently diagnosed type 2 diabetes with blood sugars much improved on metformin maximum dose and Januvia.  Still with fasting hyperglycemia at around 160.  Discussed to eat less fruit at bedtime.  Patient has been losing weight with diet.  Januvia unfortunately is expensive at $500 for 3 months, SGLT2 inhibitors were even more expensive at $500 per month.  Therefore agreed upon switching to glimepiride 2 mg once a day.  Reviewed risk of hypoglycemia.  Patient is a snacker.  She does not drink alcohol.      Plan:   Continue metformin  Use up Januvia, then switch to glimepiride 2 mg once a day.  I will call this in to the pharmacy in mid August.  Follow-up in September.  Continue to check blood as she has doing fasting and after dinner.    Blood pressure is well controlled without medications  Urine albumin was elevated once in setting of very high A1c at time of diagnosis, will recheck, if elevated 3 times will need to start low-dose ACE inhibitor or ARB  Good cholesterol, not on statin  Needs to make appointment for diabetic eye exam  Feet- asymptomatic  Follow-up with me in 3 months    This was a 25 min visit of which more than 23 minutes were spent in counseling in regards to diagnosis, clinical consequences and treatment indications and options of diabetes care    Alyce Salazar MD  Endocrinology and Diabetes    Pager [phone]     Medications:   Current Outpatient Medications   Medication Sig Dispense Refill     blood glucose (NO BRAND SPECIFIED) lancets standard Use to test blood sugar 3 times daily or as directed. 100 each 5     blood glucose (NO BRAND SPECIFIED) test strip Use to test blood sugar 3 times daily or as directed. 100 strip 5     blood glucose monitoring (NO BRAND SPECIFIED) meter device kit Use to test blood sugar 3 times daily or as directed. 1 kit 0     Coenzyme Q10 (CO Q 10 PO) Take 1 capsule by mouth daily       ferrous gluconate  (FERGON) 324 (38 Fe) MG tablet Take 1 tablet (324 mg) by mouth 2 times daily (with meals) 60 tablet 0     furosemide (LASIX) 40 MG tablet Take 1 tablet (40 mg) by mouth daily 90 tablet 3     hydrOXYzine (ATARAX) 25 MG tablet Take 1-2 tablets (25-50 mg) by mouth every 6 hours as needed for itching Do not take while driving or operating machinery due to sedation. 60 tablet 4     loratadine (CLARITIN) 10 MG tablet Take 1 tablet by mouth daily.       metFORMIN (GLUCOPHAGE) 500 MG tablet Start 2 tablet daily with breakfast and 2 tablets with dinner 360 tablet 11     Omega-3 Fatty Acids (FISH OIL TRIPLE STRENGTH) 1400 MG CAPS Take 1 capsule by mouth once daily. Indications: CARDIAC DISEASE       potassium chloride ER (KLOR-CON) 20 MEQ CR tablet Take 1 tablet (20 mEq) by mouth 2 times daily 60 tablet 0     sertraline (ZOLOFT) 100 MG tablet Take 1 tablet (100 mg) by mouth daily 90 tablet 1     sitagliptin (JANUVIA) 100 MG tablet Take 1 tablet (100 mg) by mouth daily 90 tablet 3     spironolactone (ALDACTONE) 50 MG tablet Take 1 tablet (50 mg) by mouth daily 30 tablet 0     traZODone (DESYREL) 50 MG tablet Take 1 tablet (50 mg) by mouth At Bedtime 30 tablet 1       Social History:  Social History     Tobacco Use     Smoking status: Never Smoker     Smokeless tobacco: Never Used     Tobacco comment: no passive exposure at home   Substance Use Topics     Alcohol use: No     Alcohol/week: 0.0 standard drinks       Physical Examination:  There were no vitals taken for this visit.  There is no height or weight on file to calculate BMI.    Wt Readings from Last 4 Encounters:   11/07/18 79.9 kg (176 lb 3.2 oz)   05/16/18 82.1 kg (181 lb)   05/09/18 83.3 kg (183 lb 9.6 oz)   02/13/18 82.6 kg (182 lb)        Reported vitals:  There were no vitals taken for this visit.   healthy, alert and no distress  PSYCH: Alert and oriented times 3; coherent speech, normal   rate and volume, able to articulate logical thoughts, able   to abstract  reason, no tangential thoughts, no hallucinations   or delusions  Her affect is normal and pleasant  RESP: No cough, no audible wheezing, able to talk in full sentences  Remainder of exam unable to be completed due to telephone visits     Labs and Studies:   Lab Results   Component Value Date     (L) 05/14/2020    CHLORIDE 94 05/14/2020    CO2 23 05/14/2020     (HH) 05/19/2020    CR 0.55 05/14/2020    CR 0.84 10/31/2018    CR 0.78 02/09/2018    CR 0.75 10/24/2017    CR 0.77 05/23/2017    TOYA 8.5 05/14/2020    MAG 2.3 12/14/2003    ALBUMIN 3.1 (L) 05/14/2020    ALKPHOS 113 05/14/2020    LDL 51 05/18/2020    HDL 78 05/18/2020    TRIG 76 05/18/2020     Lab Results   Component Value Date    MICROL 23 05/19/2020    MICROL 14 11/20/2007     Lab Results   Component Value Date    A1C 12.9 (H) 05/19/2020    A1C 5.1 02/12/2014    A1C 5.6 05/11/2009    A1C 5.7 02/25/2008       How would you like to obtain your AVS? MyChart    Phone call duration: 25 minutes

## 2020-06-22 NOTE — LETTER
"    6/22/2020         RE: Romina Chavarria  [address] [place] [address]        Dear Colleague,    Thank you for referring your patient, Romina Chavarria, to the [facility]. Please see a copy of my visit note below.    Date Pre-B Post-B Pre-L Post-L Pre-D Post-D  HS/NOC   6/22 166         6/21 156     112    6/20 188         6/19 176     150    6/18 174         6/17 185     191    6/16 155     168        Romina Chavarria is a 74 year old female who is being evaluated via a billable telephone visit.      The patient has been notified of following:     \"This telephone visit will be conducted via a call between you and your physician/provider. We have found that certain health care needs can be provided without the need for a physical exam.  This service lets us provide the care you need with a short phone conversation.  If a prescription is necessary we can send it directly to your pharmacy.  If lab work is needed we can place an order for that and you can then stop by our lab to have the test done at a later time.    Telephone visits are billed at different rates depending on your insurance coverage. During this emergency period, for some insurers they may be billed the same as an in-person visit.  Please reach out to your insurance provider with any questions.    If during the course of the call the physician/provider feels a telephone visit is not appropriate, you will not be charged for this service.\"    Patient has given verbal consent for Telephone visit?  Yes    What phone number would you like to be contacted at? [phone]    [facility]       Interval history:    Romina Chavarria aged 74 year old years old woman is here for T2DM follow up  Medications Metformin 500 mg 2 tiwce daily Januvia 100 mg once daily  SMBG checks twice daily fasting in the 150s and after dinner 130s  Devices meter  Hypoglycemia none  Physical activity gardening, fish pond weeding    Has been eating " tons of fruit, has an orange and an apple in the evening    Complications: no numbness no tingling, urine albumin elevated x 1  Last eye exam needs to make appointment  Feet no numbness no tingling    Cardiovascular events: blood pressure low    Assessment:  Elderly woman with recently diagnosed type 2 diabetes with blood sugars much improved on metformin maximum dose and Januvia.  Still with fasting hyperglycemia at around 160.  Discussed to eat less fruit at bedtime.  Patient has been losing weight with diet.  Januvia unfortunately is expensive at $500 for 3 months, SGLT2 inhibitors were even more expensive at $500 per month.  Therefore agreed upon switching to glimepiride 2 mg once a day.  Reviewed risk of hypoglycemia.  Patient is a snacker.  She does not drink alcohol.      Plan:   Continue metformin  Use up Januvia, then switch to glimepiride 2 mg once a day.  I will call this in to the pharmacy in mid August.  Follow-up in September.  Continue to check blood as she has doing fasting and after dinner.    Blood pressure is well controlled without medications  Urine albumin was elevated once in setting of very high A1c at time of diagnosis, will recheck, if elevated 3 times will need to start low-dose ACE inhibitor or ARB  Good cholesterol, not on statin  Needs to make appointment for diabetic eye exam  Feet- asymptomatic  Follow-up with me in 3 months    This was a 25 min visit of which more than 23 minutes were spent in counseling in regards to diagnosis, clinical consequences and treatment indications and options of diabetes care    Alyce Salazar MD  Endocrinology and Diabetes    Pager [phone]     Medications:   Current Outpatient Medications   Medication Sig Dispense Refill     blood glucose (NO BRAND SPECIFIED) lancets standard Use to test blood sugar 3 times daily or as directed. 100 each 5     blood glucose (NO BRAND SPECIFIED) test strip Use to test blood sugar 3 times daily or as  directed. 100 strip 5     blood glucose monitoring (NO BRAND SPECIFIED) meter device kit Use to test blood sugar 3 times daily or as directed. 1 kit 0     Coenzyme Q10 (CO Q 10 PO) Take 1 capsule by mouth daily       ferrous gluconate (FERGON) 324 (38 Fe) MG tablet Take 1 tablet (324 mg) by mouth 2 times daily (with meals) 60 tablet 0     furosemide (LASIX) 40 MG tablet Take 1 tablet (40 mg) by mouth daily 90 tablet 3     hydrOXYzine (ATARAX) 25 MG tablet Take 1-2 tablets (25-50 mg) by mouth every 6 hours as needed for itching Do not take while driving or operating machinery due to sedation. 60 tablet 4     loratadine (CLARITIN) 10 MG tablet Take 1 tablet by mouth daily.       metFORMIN (GLUCOPHAGE) 500 MG tablet Start 2 tablet daily with breakfast and 2 tablets with dinner 360 tablet 11     Omega-3 Fatty Acids (FISH OIL TRIPLE STRENGTH) 1400 MG CAPS Take 1 capsule by mouth once daily. Indications: CARDIAC DISEASE       potassium chloride ER (KLOR-CON) 20 MEQ CR tablet Take 1 tablet (20 mEq) by mouth 2 times daily 60 tablet 0     sertraline (ZOLOFT) 100 MG tablet Take 1 tablet (100 mg) by mouth daily 90 tablet 1     sitagliptin (JANUVIA) 100 MG tablet Take 1 tablet (100 mg) by mouth daily 90 tablet 3     spironolactone (ALDACTONE) 50 MG tablet Take 1 tablet (50 mg) by mouth daily 30 tablet 0     traZODone (DESYREL) 50 MG tablet Take 1 tablet (50 mg) by mouth At Bedtime 30 tablet 1       Social History:  Social History     Tobacco Use     Smoking status: Never Smoker     Smokeless tobacco: Never Used     Tobacco comment: no passive exposure at home   Substance Use Topics     Alcohol use: No     Alcohol/week: 0.0 standard drinks       Physical Examination:  There were no vitals taken for this visit.  There is no height or weight on file to calculate BMI.    Wt Readings from Last 4 Encounters:   11/07/18 79.9 kg (176 lb 3.2 oz)   05/16/18 82.1 kg (181 lb)   05/09/18 83.3 kg (183 lb 9.6 oz)   02/13/18 82.6 kg (182 lb)         Reported vitals:  There were no vitals taken for this visit.   healthy, alert and no distress  PSYCH: Alert and oriented times 3; coherent speech, normal   rate and volume, able to articulate logical thoughts, able   to abstract reason, no tangential thoughts, no hallucinations   or delusions  Her affect is normal and pleasant  RESP: No cough, no audible wheezing, able to talk in full sentences  Remainder of exam unable to be completed due to telephone visits     Labs and Studies:   Lab Results   Component Value Date     (L) 05/14/2020    CHLORIDE 94 05/14/2020    CO2 23 05/14/2020     (HH) 05/19/2020    CR 0.55 05/14/2020    CR 0.84 10/31/2018    CR 0.78 02/09/2018    CR 0.75 10/24/2017    CR 0.77 05/23/2017    TOYA 8.5 05/14/2020    MAG 2.3 12/14/2003    ALBUMIN 3.1 (L) 05/14/2020    ALKPHOS 113 05/14/2020    LDL 51 05/18/2020    HDL 78 05/18/2020    TRIG 76 05/18/2020     Lab Results   Component Value Date    MICROL 23 05/19/2020    MICROL 14 11/20/2007     Lab Results   Component Value Date    A1C 12.9 (H) 05/19/2020    A1C 5.1 02/12/2014    A1C 5.6 05/11/2009    A1C 5.7 02/25/2008       How would you like to obtain your AVS? [place]    Phone call duration: 25 minutes      Again, thank you for allowing me to participate in the care of your patient.        Sincerely,        Alyce Salazar MD

## 2020-06-22 NOTE — PROGRESS NOTES
"Clinic Care Coordination Contact  Follow up call:    We reviewed patients Endo appointment patient instructions from today. Patient did not complete the future labs prior to her endo appointment today. Reviewed her medications. Reviewed her future appointments. Noted she has not made her GI appointment with Dr. Reese. Patient has the scheduling line and will make this appointment. Patient planning to schedule her lab work (ordered by endo, Dr. Salazar) on the day of her DEXA scan 7/8. Patient also planning to schedule her mammo that same day. Advised patient to continue to use mychart to communicate to endocrinologist triage team regarding her blood sugars and diabetic related questions.  [facility], not in [facility], but not seeking treatment, and not on hospice patient patient.  not receiving home care services.  Daughter comes to visit, and help with household chores. Patient forgot to check blood sugar 1x in the last 1 month. Keeping close log. Patient reporting increase in energy. Patient declined wanting anyone coming to her home. Patient feels \"there is too much junk\" there she wants to go through before anyone comes there. Not agreeable to [facility] OT.      Clinical Pathway: Clinic Care Coordination Diabetes Assessment  Diabetes type: Type 2  What is the duration of your diabetes?    What is the state of your diabetes? Improving  Have you received Diabetes education in the past 24 months? Yes  Symptoms:  Do you have:  Symptoms  Fatigue: Yes  Neuropathy: No  Polydipsia: Sometimes  Polyuria: Sometimes  Visual change: No  Symptom course: Improving  Weight trend: Decreasing(150 to 144)  Hypoglycemia Symptoms:  Do you have:  Hypoglycemia symptoms  Dizziness or Light-Headedness: No  Headaches: No  Hunger: No  Mood changes: No  Sleepiness: Yes  Speech difficulty: No  Sweats: No  Feeling shaky: No  Hypoglycemia Complications:  Do you experience:  Hypoglycemia " Complications  Blackouts: No  Hospitalization: No  Nocturnal hypoglycemia: No  Required assistance: No  Required glucagon injection: No  Seizures: No  Monitoring Regimen:   Times checking blood sugar at home (number): 2  Monitoring compliance: Good  How is your weight trend? Decreasing(150 to 144)  How often do you meal plan? Avoiding sweets  Goals:   Goals        General    Medical (pt-stated)     Notes - Note edited  5/22/2020  2:47 PM by Marielos Miller RN    Goal Statement: I want to achieve an A1C of < 7.0  Date goal set: 5/22/20  Measure of Success: Patient's A1C will be less than or equal to 7.0   Barriers: new diagnosis for patient  Strengths: Motivated to engage in Clinic Care Coordination; willing to implement home strategies for improved glucose control  Date to Achieve By: 2-3 week, also ongoing.   Patient expressed understanding of goal: Patient participated in goal setting and supportive steps to achieve    Action steps to achieve this goal  1. I will follow a diabetic friendly diet, referring to the education I received at my endocrinologist appointment today via [employer].  2. I will discuss medication management strategies with my primary care provider, endocrinologist, and diabetic education nurse  4. I will participate in regular Clinic Care Coordination outreaches for ongoing support, resources, and education        Medical (pt-stated)     Notes - Note created  6/22/2020  1:57 PM by Marielos Miller RN    Goal Statement: I want to follow up with Dr. Reese  Date Goal set: 6/22/20  Barriers: new diagnosis of diabetes   Strengths: patient stated goal   Date to Achieve By: 1 month  Patient expressed understanding of goal: yes  Action steps to achieve this goal:  1. I will call [phone] to make an appointment with Dr. Reese my GI provider.           Other (pt-stated)     Notes - Note created  5/22/2020  2:44 PM by Marielos Miller RN    Goal Statement: I do not want to have another  fall  Date Goal set: 5/22/20  Barriers: patient fell last week while walking in the dark at night.   Strengths: patient stated goal  Date to Achieve By: 1 month  Patient expressed understanding of goal: yes  Action steps to achieve this goal:  -Remove trip hazards to keep pathways clear in the home  -Remove throw rugs  -Keep frequently used items in easy to reach places  -Use non-slip mats in the bathtub and on shower floors  -Improve lighting in your home in all areas  -Wear shoes or non-slip footwear inside the house   -I will follow the treatment plans as advised by my medical providers              Outreach Frequency: 2 weeks  Future Appointments              In 1 week CS ENDO DIABETES ED RESOURCE [place] [facility]    In 2 weeks Liz Richard APRN [facility] [place], [place]    In 2 weeks [facility]          Plan: CC RN will reach out to patient in 2 weeks.   REGGIE Hidalgo RN Clinic Care Coordinator   [place], [place], [place]  Phone: [phone]

## 2020-06-22 NOTE — PATIENT INSTRUCTIONS
Make appointment with eye specialist soon for diabetic eye exam  Use up Januvia (should last until end of  August)  Start glimeperide 2 mg daily  Continue Metformin  Blood work in July  Follow up with me in mid of September    Try to eat only one serving of fruit at bedtime

## 2020-06-30 NOTE — PATIENT INSTRUCTIONS
For V8 juice, try to choose the lower sodium option.     Try to have some nuts for bedtime snack or with fruit if you have it.     If you want fruit in the evening then just have 1 piece or share a piece with your .     On occasion, could have the lower sugar or diet juices     Start using exercise equipment in living room twice daily for 10 min each time.     Bring blood glucose meter and logbook with you to all doctor and follow-up appointments.    Diabetes Education Telephone Visit Follow-up:    We realize your time is valuable and your health is important! We offer a convenient Telephone Visit follow up! It s a quick way to check in for a medication dose adjustment without having to come back to clinic as soon.    Telephone Visits are often covered by insurance. Please check with your insurance plan to see if this type of visit is covered. If not, the cost is less expensive than an office visit:      Up to 10 minutes (Code 69929): $30    11-20 minutes (Code 40104): $59    More than 20 minutes (Code 59926): $85    Talk with your Diabetes Educator if you want to learn more.      [facility]:  For Your Diabetes Education and Nutrition Appointments Call:  [phone]   For Diabetes Education or Nutrition Related Questions:   Phone: [phone]  E-mail: [email or web address]  Fax: [phone]   If you need a medication refill please contact your pharmacy. Please allow 3 business days for your refills to be completed.

## 2020-06-30 NOTE — PROGRESS NOTES
"      Diabetes Self-Management Education & Support    Presents for: Follow-up    Patient verbally consented to the telephone visit service today: yes    Audio only visit done, as patient does not have access to audio-visual technology.    Individual visit provided, given no group classes are available for 2 months.     SUBJECTIVE/OBJECTIVE:  Presents for: Follow-up for new dx - visit 2 today  Accompanied by: Self  Diabetes education in the past 24mo: No  Diabetes type: Type 2  Date of diagnosis: new dx  Disease course: Improving  Diabetes management related comments/concerns: What makes it go up? I like fruit and corn.   Other concerns:: [place] (Hard of hearing), Glasses, Hearing aides  Cultural Influences/Ethnic Background:  American    Diabetes Symptoms & Complications:  Fatigue: Yes  Neuropathy: No  Polydipsia: Sometimes  Polyphagia: Sometimes  Polyuria: Sometimes  Visual change: No  Slow healing wounds: Yes  Symptom course: Improving  Weight trend: Decreasing  Complications assessed today?: Yes  Autonomic neuropathy: No  CVA: No  Heart disease: No  Nephropathy: Other  Peripheral neuropathy: No  Peripheral Vascular Disease: Yes  Retinopathy: No    Patient Problem List and Family Medical History reviewed for relevant medical history, current medical status, and diabetes risk factors.    Vitals:  Ht 1.473 m (4' 10\")   Wt 65.3 kg (144 lb)   BMI 30.10 kg/m    Estimated body mass index is 30.1 kg/m  as calculated from the following:    Height as of this encounter: 1.473 m (4' 10\").    Weight as of this encounter: 65.3 kg (144 lb).  per report with home scale.   Has lost quite a bit of weight the past 18 months or so. Reports clothes are looser and legs are smaller and bras are too big now.     Last 3 BP:   BP Readings from Last 3 Encounters:   05/18/20 127/89   11/07/18 133/81   05/16/18 136/78       History   Smoking Status     Never Smoker   Smokeless Tobacco     Never Used     Comment: no passive exposure at home "       Labs:  Lab Results   Component Value Date    A1C 12.9 05/19/2020     Lab Results   Component Value Date     05/19/2020     Lab Results   Component Value Date    LDL 51 05/18/2020     HDL Cholesterol   Date Value Ref Range Status   05/18/2020 78 >49 mg/dL Final   ]  GFR Estimate   Date Value Ref Range Status   05/14/2020 >90 >60 mL/min/[1.73_m2] Final     Comment:     Non  GFR Calc  Starting 12/18/2018, serum creatinine based estimated GFR (eGFR) will be   calculated using the Chronic Kidney Disease Epidemiology Collaboration   (CKD-EPI) equation.       GFR Estimate If Black   Date Value Ref Range Status   05/14/2020 >90 >60 mL/min/[1.73_m2] Final     Comment:      GFR Calc  Starting 12/18/2018, serum creatinine based estimated GFR (eGFR) will be   calculated using the Chronic Kidney Disease Epidemiology Collaboration   (CKD-EPI) equation.       Lab Results   Component Value Date    CR 0.55 05/14/2020     No results found for: MICROALBUMIN    Healthy Eating:  Healthy Eating Assessed Today: Yes  Cultural/Buddhism diet restrictions?: No  Meal planning/habits: Avoiding sweets, Smaller portions, Keeps food records, Frequent snacking  How many times a week on average do you eat food made away from home (restaurant/take-out)?: 2  Meals include: Breakfast, Lunch, Dinner, Afternoon Snack, Evening Snack  Breakfast: fruit (~1 c melon, 1 c cantaloupe, and strawberries x couple) so far but plans to have a boiled egg in a little bit OR boiled egg and toast and water  Lunch: chicken (grilled) with wild rice (~1 c) and water or sugar free green tea  Dinner: hamburger (sometimes bun and sometimes not) with lettuce and tomato OR  corn (ate 4 cobs) with some cheese and iced tea (unsweetened)  Snacks: quite having fruit in the evening (sometimes has 1-2 pieces like an apple or orange) OR  popcorn  Beverages: Water, Milk, Diet soda    Being Active:  Being Active Assessed Today:  Yes  Exercise:: Currently not exercising(has exercise equipment at home)  Barrier to exercise: Time, Safety, Physical limitation  Has 2 gardens and is outside most days and on her feet.    Monitoring:  Monitoring Assessed Today: Yes  Did patient bring glucose meter to appointment? : Yes  Times checking blood sugar at home (number): 3  Times checking blood sugar at home (per): Day  Blood glucose trend: Decreasing    Date Breakfast  Lunch  Dinner  Bedtime    Before After Before After Before After    6/22 166      271   6/23 145      151   6/24 174      195   6/25 192      147   6/26 165      138   6/27 174      134     179 last night and 155 this morning (had some nuts last night)  Might have some fruit or cottage cheese with sugar free jello after checking her BG in the evening    Taking Medications:  Diabetes Medication(s)     Biguanides       metFORMIN (GLUCOPHAGE) 500 MG tablet    Start 2 tablet daily with breakfast and 2 tablets with dinner    Dipeptidyl Peptidase-4 (DPP-4) Inhibitors       sitagliptin (JANUVIA) 100 MG tablet    Take 1 tablet (100 mg) by mouth daily          Taking Medication Assessed Today: Yes  Current Treatments: Diet, Oral Medication (taken by mouth)  Problems taking diabetes medications regularly?: No  Diabetes medication side effects?: No    Problem Solving:  Problem Solving Assessed Today: Yes  Is the patient at risk for hypoglycemia?: No    Reducing Risks:  Reducing Risks Assessed Today: Yes  Diabetes Risks: Age over 45 years, Sedentary Lifestyle, Hyperlipidemia  CAD Risks: Sedentary lifestyle, Stress, Diabetes Mellitus, Obesity  Has dilated eye exam at least once a year?: No  Sees dentist every 6 months?: Yes  Feet checked by healthcare provider in the last year?: No    Healthy Coping:  Healthy Coping Assessed Today: Yes  Emotional response to diabetes: Ready to learn  Informal Support system:: Children, Karen based, Family, Friends, Spouse  Stage of change: ACTION (Actively working  towards change)  Patient Activation Measure Survey Score:  ZEFERINO Score (Last Two) 5/10/2011   ZEFERINO Raw Score 36   Activation Score 47.4   ZEFERINO Level 2       Diabetes knowledge and skills assessment:   Patient is knowledgeable in diabetes management concepts related to: Monitoring  Patient needs further education on the following diabetes management concepts: Healthy Eating, Being Active, Monitoring, Taking Medication, Problem Solving, Reducing Risks and Healthy Coping  Based on learning assessment above, most appropriate setting for further diabetes education would be: Group class or Individual setting.      INTERVENTIONS:    Education provided today on:  AADE Self-Care Behaviors:  Healthy Eating: carbohydrate counting, consistency in amount, composition, and timing of food intake, heart healthy diet and portion control  Being Active: relationship to blood glucose and encouraged adding in some more exercise now that she is feeling better. She has exercise equipment at home and is open to start using it even for a few min a few times per day.    Monitoring: log and interpret results and individual blood glucose targets  Taking Medication: action of prescribed medication and reminded her that her medication will be changing when she is done with her Januvia (per her visit with endo).   Problem Solving: low blood glucose - causes, signs/symptoms, treatment and prevention (for when she changes medications)    Opportunities for ongoing education and support in diabetes-self management were discussed.    Pt verbalized understanding of concepts discussed and recommendations provided today.       Education Materials Provided:  No new materials provided today      ASSESSMENT:  Romina has been making quite a few changes to her diet in the past few weeks. Has reduced her sweet intake and sugar sweetened beverages/juice.     Her blood sugars have continued to improve. Suspect some of the higher morning numbers are r/t eating her  bedtime snacks after checking her evening BG.  She recently reduced her fruit intake after talking to her endo in the evening.  Was previously eating 2 apples or oranges, etc in the evening.  Now if she has one she limits it to just one piece of fruit. Would benefit from having some additional protein to her breakfast when she has fruit and also with her evening snack. Reports she likes nuts and always has these around.     Per endo note, plan is to change to glimepiride 2 mg/d when Januvia runs out (d/t cost of Januvia). Reiterated risk of hypo when she changes medications.     Goals Addressed as of 6/30/2020 at 11:56 AM       Being Active (pt-stated)     Added 6/30/20 by Krista Russ RD     My Goal: I will try to use my exercise machine in the living room twice daily for about 10 min each time.     What I need to meet my goal: has multiple pieces of exercise equipment at home but has to clean the treadmill    I plan to meet my goal by this date: next CDE visit.              Patient's most recent   Lab Results   Component Value Date    A1C 12.9 05/19/2020    is not meeting goal of <7.0    PLAN  See Patient Instructions for co-developed, patient-stated behavior change goals.  Encouraged some protein such as nuts/seeds with her fruit at breakfast and for her evening snack.   If she has fruit in the evening then recommend sharing a piece with her  or keeping it to one piece with some protein.   If she DOES want some juice, encouraged sugar free juice but emphasized water is best.   Romina will start exercising at home twice per day for about 10 min per day.   Follow up scheduled in 4 weeks via phone again.   AVS printed and [id number] to patient today. See Follow-Up section for recommended follow-up.    IVORY Gates CDE    Time Spent: 41 minutes  Encounter Type: Individual    Any diabetes medication dose changes were made via the CDE Protocol and Collaborative Practice Agreement with the patient's  referring provider. A copy of this encounter was shared with the provider.

## 2020-07-01 NOTE — PROGRESS NOTES
"Romina Chavarria is a 74 year old female who is being evaluated via a billable telephone visit.      The patient has been notified of following:     \"This telephone visit will be conducted via a call between you and your physician/provider. We have found that certain health care needs can be provided without the need for a physical exam.  This service lets us provide the care you need with a short phone conversation.  If a prescription is necessary we can send it directly to your pharmacy.  If lab work is needed we can place an order for that and you can then stop by our lab to have the test done at a later time.    Telephone visits are billed at different rates depending on your insurance coverage. During this emergency period, for some insurers they may be billed the same as an in-person visit.  Please reach out to your insurance provider with any questions.    If during the course of the call the physician/provider feels a telephone visit is not appropriate, you will not be charged for this service.\"    Patient has given verbal consent for Telephone visit?  Yes    What phone number would you like to be contacted at? [phone]    How would you like to obtain your AVS? Lazaro Grider     Romina Chavarria is a 74 year old female who presents via phone visit today for the following health issues:    HPI  Diabetes Check in, sees endocrinologist    How often are you checking your blood sugar? Two times daily  Blood sugar testing frequency justification:  to control  What time of day are you checking your blood sugars (select all that apply)?  Before meals  Have you had any blood sugars above 200?  Yes 207 on 07/04/2020   Have you had any blood sugars below 70?  No    What symptoms do you notice when your blood sugar is low?  Shaky, Dizzy and Weak    What concerns do you have today about your diabetes? None     Do you have any of these symptoms? (Select all that apply)  Excessive thirst and Weight gain 5lbs over the " weekend    Have you had a diabetic eye exam in the last 12 months? No      Last week:  Monday 179 in the AM and 203 at night before bedtime.   Tuesday 157 in the AM and 185 at night before bedtime. ( a lot of fruit this day)  Wednesday 173 in the AM and 202 at 9pm at night after granola with sugar   No recent falls.   Has been followed by endocrinologist  Finishing up the [facility] plans to switch to the glimepiride 2mg once a day.   Follow up with endocrinologist visit in September.       Printed out something online to help with iron and dietary items to eat to help with this. Taking Ferrous gluconate as well.       IDALIA-7 SCORE 10/29/2014 5/8/2018   Total Score 5 -   Total Score - 7 (mild anxiety)   Total Score - 7       PHQ 5/8/2018   PHQ-9 Total Score 7   Q9: Thoughts of better off dead/self-harm past 2 weeks Not at all         BP Readings from Last 2 Encounters:   05/18/20 127/89   11/07/18 133/81     Hemoglobin A1C (%)   Date Value   05/19/2020 12.9 (H)   02/12/2014 5.1     LDL Cholesterol Calculated (mg/dL)   Date Value   05/18/2020 51   07/10/2013 106           How many servings of fruits and vegetables do you eat daily?  4 or more    On average, how many sweetened beverages do you drink each day (Examples: soda, juice, sweet tea, etc.  Do NOT count diet or artificially sweetened beverages)?   0    How many days per week do you exercise enough to make your heart beat faster? 7    How many minutes a day do you exercise enough to make your heart beat faster? 10 - 19    How many days per week do you miss taking your medication? 0        Current Outpatient Medications   Medication Sig Dispense Refill     blood glucose (NO BRAND SPECIFIED) lancets standard Use to test blood sugar 3 times daily or as directed. 100 each 5     blood glucose (NO BRAND SPECIFIED) test strip Use to test blood sugar 3 times daily or as directed. 100 strip 5     blood glucose monitoring (NO BRAND SPECIFIED) meter device kit Use to test  blood sugar 3 times daily or as directed. 1 kit 0     ferrous gluconate (FERGON) 324 (38 Fe) MG tablet Take 1 tablet (324 mg) by mouth daily (with breakfast) 60 tablet 0     furosemide (LASIX) 40 MG tablet Take 1 tablet (40 mg) by mouth daily 90 tablet 3     hydrOXYzine (ATARAX) 25 MG tablet Take 1-2 tablets (25-50 mg) by mouth every 6 hours as needed for itching Do not take while driving or operating machinery due to sedation. 60 tablet 4     metFORMIN (GLUCOPHAGE) 500 MG tablet Start 2 tablet daily with breakfast and 2 tablets with dinner 360 tablet 11     Omega-3 Fatty Acids (FISH OIL TRIPLE STRENGTH) 1400 MG CAPS Take 1 capsule by mouth once daily. Indications: CARDIAC DISEASE       sertraline (ZOLOFT) 100 MG tablet Take 1 tablet (100 mg) by mouth daily 90 tablet 1     sitagliptin (JANUVIA) 100 MG tablet Take 1 tablet (100 mg) by mouth daily 90 tablet 3     traZODone (DESYREL) 50 MG tablet Take 1 tablet (50 mg) by mouth At Bedtime 90 tablet 1     Coenzyme Q10 (CO Q 10 PO) Take 1 capsule by mouth daily       loratadine (CLARITIN) 10 MG tablet Take 1 tablet by mouth daily.       potassium chloride ER (KLOR-CON) 20 MEQ CR tablet Take 1 tablet (20 mEq) by mouth 2 times daily 60 tablet 0     BP Readings from Last 3 Encounters:   05/18/20 127/89   11/07/18 133/81   05/16/18 136/78    Wt Readings from Last 3 Encounters:   06/30/20 65.3 kg (144 lb)   11/07/18 79.9 kg (176 lb 3.2 oz)   05/16/18 82.1 kg (181 lb)                    Reviewed and updated as needed this visit by Provider         Review of Systems   Constitutional: Negative.    HENT: Negative.    Respiratory: Negative.    Cardiovascular: Negative.              Objective   Reported vitals:  There were no vitals taken for this visit.   healthy, alert and no distress  PSYCH: Alert and oriented times 3; coherent speech, normal   rate and volume, able to articulate logical thoughts, able   to abstract reason, no tangential thoughts, no hallucinations   or delusions   Her affect is normal  RESP: No cough, no audible wheezing, able to talk in full sentences  Remainder of exam unable to be completed due to telephone visits    Diagnostic Test Results:  No results found for any visits on 07/06/20.        Assessment/Plan:  1. Type 2 diabetes mellitus with hyperglycemia, without long-term current use of insulin (H)  - Stable and doing well  - Motivated to see her numbers continue to improved  - Has appointment with endocrinology and diabetic education scheduled  -    2. Alcohol dependence in remission (H)  - Continues to be sober.     3. Chronic hepatitis C without hepatic coma (H)  - Has a follow up with Dr. Reese     4. Hyperlipidemia with target LDL less than 160  - Stable off statin    5. Compliance poor  - Doing better at being compliant with medications and treatment. Has her Dexa and mammogram scheduled. I still do feel she would benefit from having some home care for her medications and BP checks. She continues to decline this. She does work with care coordination on a regular basis which I do feel has helped her.     6. Gastrointestinal hemorrhage, unspecified gastrointestinal hemorrhage type    - ferrous gluconate (FERGON) 324 (38 Fe) MG tablet; Take 1 tablet (324 mg) by mouth daily (with breakfast)  Dispense: 60 tablet; Refill: 0    Return in about 1 month (around 8/6/2020) for Physical Exam.      Follow up in 1 month for physical exam     Phone call duration:  15 minutes    MELE Howard CNP

## 2020-07-07 NOTE — PROGRESS NOTES
"Clinic Care Coordination Contact    Follow Up Progress Note      Assessment: Patient has been feeling better. \"I cant believe how sick I was.\" Patient reports she is still not drinking ETOH, eating less sugar, taking her meds (med rec completed) ,and checking her blood sugars diligently. Working with diabetic ed, endocrinology, PCP, and patient needs to make Dr. Reese GI appointment. Patient is reading up on how to follow diabetic friendly meals. Denies any further falls.   Discussed upcoming appointments. She is shopping for a supplemental insurance that might make medications cheaper, specifically the Januvia.    Goals addressed this encounter:   Goals Addressed                 This Visit's Progress       General      Medical (pt-stated)        Goal Statement: I want to achieve an A1C of < 7.0  Date goal set: 5/22/20  Measure of Success: Patient's A1C will be less than or equal to 7.0   Barriers: new diagnosis for patient  Strengths: Motivated to engage in Clinic Care Coordination; willing to implement home strategies for improved glucose control  Date to Achieve By: 1 month also ongoing.   Patient expressed understanding of goal: Patient participated in goal setting and supportive steps to achieve    Action steps to achieve this goal  1. I will follow a diabetic friendly diet, referring to the education I received at my endocrinologist/DM ed appointment.  2. I will discuss medication management strategies with my primary care provider, endocrinologist, and diabetic education nurse  4. I will participate in regular Clinic Care Coordination outreaches for ongoing support, resources, and education          Medical (pt-stated)   0%     Goal Statement: I want to follow up with Dr. Reese  Date Goal set: 6/22/20  Barriers: new diagnosis of diabetes   Strengths: patient stated goal   Date to Achieve By: 1 month  Patient expressed understanding of goal: yes  Action steps to achieve this goal:  1. I will call [phone] to " make an appointment with Dr. Reese my GI provider.             Other (pt-stated)   On track     Goal Statement: I do not want to have another fall  Date Goal set: 5/22/20  Barriers: patient fell last week while walking in the dark at night.   Strengths: patient stated goal  Date to Achieve By: 1 month  Patient expressed understanding of goal: yes  Action steps to achieve this goal:  -Remove trip hazards to keep pathways clear in the home  -Remove throw rugs  -Keep frequently used items in easy to reach places  -Use non-slip mats in the bathtub and on shower floors  -Improve lighting in your home in all areas  -Wear shoes or non-slip footwear inside the house   -I will follow the treatment plans as advised by my medical providers            Intervention/Education provided during outreach: I will reach out to my clinic or specialty as needed going forward.      Outreach Frequency: monthly    Plan:   Care Coordinator will follow up in 1 month.  REGGIE Hidalgo RN Clinic Care Coordinator   [place], [place], [place]  Phone: [phone]

## 2020-07-08 NOTE — PROGRESS NOTES
Appropriate assistive devices provided during their visit. na(Yes, No, N/A) na (list device)    Exam table and/or cart  placed in the lowest position. na (Yes, No, N/A)    Brakes on tables/carts/wheelchairs used at all times. na (Yes, No, N/A)    Non slip footwear applied. na (Yes, No, NA)    Patient was accompanied by staff throughout visit. yes (Yes, No, N/A)    Equipment safety straps used. na (Yes, No, N/A)    Assist with toileting. na (Yes, No, N/A)

## 2020-08-04 NOTE — PROGRESS NOTES
"Romina Chavarria is a 74 year old female who is being evaluated via a billable video visit.      The patient has been notified of following:     \"This video visit will be conducted via a call between you and your physician/provider. We have found that certain health care needs can be provided without the need for an in-person physical exam.  This service lets us provide the care you need with a video conversation.  If a prescription is necessary we can send it directly to your pharmacy.  If lab work is needed we can place an order for that and you can then stop by our lab to have the test done at a later time.    Video visits are billed at different rates depending on your insurance coverage.  Please reach out to your insurance provider with any questions.    If during the course of the call the physician/provider feels a video visit is not appropriate, you will not be charged for this service.\"    Patient has given verbal consent for Video visit? Yes  How would you like to obtain your AVS? MyChart  If you are dropped from the video visit, the video invite should be resent to: Send to e-mail at: [email or web address]  Will anyone else be joining your video visit? No    Subjective     Romina Chavarria is a 74 year old female who presents today via video visit for the following health issues:    HPI      Annual Wellness Visit  Are you in the first 12 months of your Medicare Part B coverage?  No    Physical Health:    In general, how would you rate your overall physical health? good    Outside of work, how many days during the week do you exercise?none    Outside of work, approximately how many minutes a day do you exercise?not applicable    If you drink alcohol do you typically have >3 drinks per day or >7 drinks per week? No    Do you usually eat at least 4 servings of fruit and vegetables a day, include whole grains & fiber and avoid regularly eating high fat or \"junk\" foods? Yes    Do you have any problems taking medications " "regularly? No    Do you have any side effects from medications? none    Needs assistance for the following daily activities: no assistance needed    Which of the following safety concerns are present in your home?  none identified     Hearing impairment: Yes, Difficulty understanding soft or whispered speech.    Difficulty understanding speech on the telephone.    In the past 6 months, have you been bothered by leaking of urine? no    Mental Health:    In general, how would you rate your overall mental or emotional health? good  PHQ-2 Score:      Do you feel safe in your environment? Yes    Have you ever done Advance Care Planning? (For example, a Health Directive, POLST, or a discussion with a medical provider or your loved ones about your wishes)? No, advance care planning information given to patient to review.  {:102577}    Fall risk:  Fallen 2 or more times in the past year?: No  Any fall with injury in the past year?: No    Cognitive Screenin) Repeat 3 items (Leader, Season, Table)  {Get patient's attention, then say, \"I am going to say three words that I want you to remember now and later.  The words are Leader, Season, and Table.  Please say them for me now.\"  If pt. unable after 3 tries, go to next item}  2) Clock draw: {Say the following phrases in order: \"Please draw a clock.  Start by drawing a large Sycuan.  Put all the numbers in the Sycuan and set the hands to show 11:10 (10 past 11).\"  If pt cannot complete in 3 minutes, stop and ask for recall items.  \"NORMAL\" test = all twelve numbers in correct location, and clock hands correctly designating 11:10}{ :98766::\"NORMAL\"}  3) 3 item recall: {Say: \"What were the three words I asked you to remember?\"}{ :812222}  Results: {MINICO RESULTS:567241}    Mini-CogTM Copyright JUSTIN Cade. Licensed by the author for use in [employer]; reprinted with permission ([email or web address]). All rights reserved.      {Do you have sleep apnea, excessive snoring " or daytime drowsiness? (Optional):489949}    Current providers sharing in care for this patient include:   Patient Care Team:  Monique Ambriz MD as PCP - General (Family Practice)  José Antonio Reese MD as MD (Gastroenterology)  Liz Richard APRN CNP as Assigned PCP  Marielos Miller, RN as Lead Care Coordinator (Primary Care - CC)  Krista Russ RD as Diabetes Educator (Dietitian, Registered)         Video Start Time: 12:30      {additonal problems for provider to add (Optional):173994}    Current Outpatient Medications   Medication Sig Dispense Refill     blood glucose (NO BRAND SPECIFIED) lancets standard Use to test blood sugar 3 times daily or as directed. 100 each 5     blood glucose (NO BRAND SPECIFIED) test strip Use to test blood sugar 3 times daily or as directed. 100 strip 5     blood glucose monitoring (NO BRAND SPECIFIED) meter device kit Use to test blood sugar 3 times daily or as directed. 1 kit 0     Coenzyme Q10 (CO Q 10 PO) Take 1 capsule by mouth daily       ferrous gluconate (FERGON) 324 (38 Fe) MG tablet Take 1 tablet (324 mg) by mouth daily (with breakfast) 60 tablet 0     furosemide (LASIX) 40 MG tablet Take 1 tablet (40 mg) by mouth daily 90 tablet 3     hydrOXYzine (ATARAX) 25 MG tablet Take 1-2 tablets (25-50 mg) by mouth every 6 hours as needed for itching Do not take while driving or operating machinery due to sedation. 60 tablet 4     metFORMIN (GLUCOPHAGE) 500 MG tablet Start 2 tablet daily with breakfast and 2 tablets with dinner 360 tablet 11     potassium chloride ER (KLOR-CON) 20 MEQ CR tablet Take 1 tablet (20 mEq) by mouth 2 times daily 60 tablet 0     sertraline (ZOLOFT) 100 MG tablet Take 1 tablet (100 mg) by mouth daily 90 tablet 1     sitagliptin (JANUVIA) 100 MG tablet Take 1 tablet (100 mg) by mouth daily 90 tablet 3     traZODone (DESYREL) 50 MG tablet Take 1 tablet (50 mg) by mouth At Bedtime 90 tablet 1       Reviewed and updated as needed this visit by  "Provider         Review of Systems         Objective             Physical Exam     {video visit exam brief selected:345187::\"GENERAL: Healthy, alert and no distress\",\"EYES: Eyes grossly normal to inspection.  No discharge or erythema, or obvious scleral/conjunctival abnormalities.\",\"RESP: No audible wheeze, cough, or visible cyanosis.  No visible retractions or increased work of breathing.  \",\"SKIN: Visible skin clear. No significant rash, abnormal pigmentation or lesions.\",\"NEURO: Cranial nerves grossly intact.  Mentation and speech appropriate for age.\",\"PSYCH: Mentation appears normal, affect normal/bright, judgement and insight intact, normal speech and appearance well-groomed.\"}      {Diagnostic Test Results (Optional):505847::\"Diagnostic Test Results:\",\"Labs reviewed in Epic\"}        {PROVIDER CHARTING PREFERENCE:621071}      Video-Visit Details    Type of service:  Video Visit    Video End Time:{video visit start/end time for provider to select:152948}    Originating Location (pt. Location): {video visit patient location:309002::\"Home\"}    Distant Location (provider location):  [facility]     Platform used for Video Visit: {Virtual Visit Platforms:775644::\"Devolia\"}    No follow-ups on file.       {signature options:711677}      "

## 2020-08-10 NOTE — TELEPHONE ENCOUNTER
Check out note from 08/10/20    Please have patient mail or bring in the clock   Mail advance directive   Mail AVS / Mychart   Have patient remember to check with her insurance on the shingles vaccine      MELE Howard CNP  Questions or concerns please feel free to send me a [employer] message or call me  Phone : [phone]

## 2020-08-10 NOTE — PROGRESS NOTES
"Romina Chavarria is a 74 year old female who is being evaluated via a billable telephone visit.      The patient has been notified of following:     \"This telephone visit will be conducted via a call between you and your physician/provider. We have found that certain health care needs can be provided without the need for a physical exam.  This service lets us provide the care you need with a short phone conversation.  If a prescription is necessary we can send it directly to your pharmacy.  If lab work is needed we can place an order for that and you can then stop by our lab to have the test done at a later time.    Telephone visits are billed at different rates depending on your insurance coverage. During this emergency period, for some insurers they may be billed the same as an in-person visit.  Please reach out to your insurance provider with any questions.    If during the course of the call the physician/provider feels a telephone visit is not appropriate, you will not be charged for this service.\"    Patient has given verbal consent for Telephone visit?  Yes    What phone number would you like to be contacted at?     How would you like to obtain your AVS? Mail a copy    Subjective     Romina Chavarria is a 74 year old female who presents via phone visit today for the following health issues:    HPI        Annual Wellness Visit  Are you in the first 12 months of your Medicare Part B coverage?  No    Lost 40lbs with diet.   Purposely lost weight with diet and exercise.    not feeling well with his stage 4 bone cancer and prostate cancer.     Physical Health:    In general, how would you rate your overall physical health? good    Outside of work, how many days during the week do you exercise?none    Outside of work, approximately how many minutes a day do you exercise?not applicable    If you drink alcohol do you typically have >3 drinks per day or >7 drinks per week? No    Do you usually eat at least 4 servings of " "fruit and vegetables a day, include whole grains & fiber and avoid regularly eating high fat or \"junk\" foods? Yes    Do you have any problems taking medications regularly? No    Do you have any side effects from medications? none    Needs assistance for the following daily activities: no assistance needed    Which of the following safety concerns are present in your home?  none identified     Hearing impairment: Yes, Difficulty understanding soft or whispered speech.    Difficulty understanding speech on the telephone.    In the past 6 months, have you been bothered by leaking of urine? no    Followed by endocrinology for her Diabetes.     Today 149 in the AM  Been down in the AM and much improved  131 yesterday.     Mental Health:    In general, how would you rate your overall mental or emotional health? good  PHQ-2 Score:      Do you feel safe in your environment? Yes    Have you ever done Advance Care Planning? (For example, a Health Directive, POLST, or a discussion with a medical provider or your loved ones about your wishes)? No, advance care planning information given to patient to review.  Advanced care planning was discussed at today's visit.    Fall risk:  Fallen 2 or more times in the past year?: No  Any fall with injury in the past year?: No    Cognitive Screenin) Repeat 3 items (Leader, Season, Table)    2) Clock draw: Patient to bring in picture to clinic.   3) 3 item recall: Recalls 2 objects   Results: NORMAL clock, 1-2 items recalled: COGNITIVE IMPAIRMENT LESS LIKELY    Mini-CogTM Copyright JUSTIN Cade. Licensed by the author for use in [facility]; reprinted with permission ([email or web address]). All rights reserved.      Do you have sleep apnea, excessive snoring or daytime drowsiness?: no    Current Outpatient Medications   Medication Sig Dispense Refill     blood glucose (NO BRAND SPECIFIED) lancets standard Use to test blood sugar 3 times daily or as directed. 100 each 5     blood " glucose (NO BRAND SPECIFIED) test strip Use to test blood sugar 3 times daily or as directed. 100 strip 5     blood glucose monitoring (NO BRAND SPECIFIED) meter device kit Use to test blood sugar 3 times daily or as directed. 1 kit 0     Coenzyme Q10 (CO Q 10 PO) Take 1 capsule by mouth daily       ferrous gluconate (FERGON) 324 (38 Fe) MG tablet Take 1 tablet (324 mg) by mouth daily (with breakfast) 60 tablet 0     furosemide (LASIX) 40 MG tablet Take 1 tablet (40 mg) by mouth daily 90 tablet 3     hydrOXYzine (ATARAX) 25 MG tablet Take 1-2 tablets (25-50 mg) by mouth every 6 hours as needed for itching Do not take while driving or operating machinery due to sedation. 60 tablet 4     metFORMIN (GLUCOPHAGE) 500 MG tablet Start 2 tablet daily with breakfast and 2 tablets with dinner 360 tablet 11     potassium chloride ER (KLOR-CON) 20 MEQ CR tablet Take 1 tablet (20 mEq) by mouth 2 times daily 60 tablet 0     sertraline (ZOLOFT) 100 MG tablet Take 1 tablet (100 mg) by mouth daily 90 tablet 1     sitagliptin (JANUVIA) 100 MG tablet Take 1 tablet (100 mg) by mouth daily 90 tablet 3     traZODone (DESYREL) 50 MG tablet Take 1 tablet (50 mg) by mouth At Bedtime 90 tablet 1     BP Readings from Last 3 Encounters:   05/18/20 127/89   11/07/18 133/81   05/16/18 136/78    Wt Readings from Last 3 Encounters:   06/30/20 65.3 kg (144 lb)   11/07/18 79.9 kg (176 lb 3.2 oz)   05/16/18 82.1 kg (181 lb)                    Reviewed and updated as needed this visit by Provider  Meds         Review of Systems   Constitutional: Negative.    HENT: Negative.    Respiratory: Negative.    Cardiovascular: Negative.    Gastrointestinal: Negative.    Endocrine: Negative.    Breasts:  negative.    Genitourinary: Negative.    Musculoskeletal: Negative.    Skin: Negative.    Neurological: Negative.    Psychiatric/Behavioral: Negative.              Objective   Reported vitals:  There were no vitals taken for this visit.   healthy, alert and no  distress  PSYCH: Alert and oriented times 3; coherent speech, normal   rate and volume, able to articulate logical thoughts, able   to abstract reason, no tangential thoughts, no hallucinations   or delusions  Her affect is normal  RESP: No cough, no audible wheezing, able to talk in full sentences  Remainder of exam unable to be completed due to telephone visits    Diagnostic Test Results:  Labs reviewed in Epic        Assessment/Plan:    1. Encounter for Medicare annual wellness exam  - Updated HM  - Attempted to do video and failed, did telephone.   - Passed cognitive screening  - Discussed doing advanced care planning and will mail out to patient  - Check on getting shingles vaccine.     2. Alcohol dependence in remission (H)  - Continues to be sober     3. IDALIA (generalized anxiety disorder)  - Stable     4. Type 2 diabetes mellitus with hyperglycemia, without long-term current use of insulin (H)  - Followed by endocrinology and feels she has more energy and numbers are improving.     5. Hyperlipidemia with target LDL less than 70  - Stable meeting LDL requirements for DM without statin. Recommend consider adding statin at next visit given ASCVD score although with age may want to consider benefit vs risk. Continue Asprin.     The 10-year ASCVD risk score (Al[place]ona YOLANDA Jr., et al., 2013) is: 21.7%    Values used to calculate the score:      Age: 74 years      Sex: Female      Is Non- : No      Diabetic: Yes      Tobacco smoker: No      Systolic Blood Pressure: 127 mmHg      Is BP treated: No      HDL Cholesterol: 78 mg/dL      Total Cholesterol: 144 mg/dL        Return in about 3 months (around 11/10/2020) for Med check.      Phone call duration:  15 minutes, of which 7 minutes spent evaluating patients chronic conditions.     MELE Howard CNP

## 2020-08-10 NOTE — PATIENT INSTRUCTIONS
Check at the pharmacy for coverage of the new shingles vaccine, Shingrix. If it is not covered now, it may be covered later in the year. Shingrix is given in a 2 shot series, between 2 and 6 months apart. It is recommended for adults age 50 and older. Initial studies have indicated that this new vaccine is 85-90% effective at preventing shingles, and is preferred over the old Zostavax vaccine.     Patient Education   Personalized Prevention Plan  You are due for the preventive services outlined below.  Your care team is available to assist you in scheduling these services.  If you have already completed any of these items, please share that information with your care team to update in your medical record.  Health Maintenance Due   Topic Date Due     Diabetic Foot Exam  1945     Eye Exam  1945     Zoster (Shingles) Vaccine (1 of 2) 11/11/1996     Discuss Advance Care Planning  07/28/2016     A1C Lab  08/19/2020

## 2020-08-11 NOTE — PROGRESS NOTES
Clinic Care Coordination Contact  [facility]/Voicemail       Clinical Data: Care Coordinator Outreach  Outreach attempted x 1.  Left message on patient's voicemail with call back information and requested return call.  Plan: Care Coordinator will try to reach patient again in 1-2 business days.    BRETT HidalgoN RN Clinic Care Coordinator   [place], [place], [place]  Phone: [phone]

## 2020-08-12 NOTE — PROGRESS NOTES
"Clinic Care Coordination Contact  [facility]/Voicemail       Clinical Data: Care Coordinator Outreach  Outreach attempted x 2.    The following recording was heard: \"Were sorry, the person you are calling is not accepting calls at this time\"   CC RN called 2x to ensure this was not heard in error.     Plan: patient has previously been engaged in care coordination. Care Coordinator will try to reach patient again in 10 business days.    REGGIE Hidalgo RN Clinic Care Coordinator   [place], [place], [place]  Phone: [phone]     "

## 2020-08-13 NOTE — TELEPHONE ENCOUNTER
Please call patient.  Has lancets and test strips sent to mail order in June.  Does she need them to local?    BRETT BoltonN, RN, PHN

## 2020-08-14 NOTE — TELEPHONE ENCOUNTER
Lm for patient to call us back to ask if sh eneeds her strips and lancets sent to local pharmacy due these were sent back in June mail order.

## 2020-08-14 NOTE — TELEPHONE ENCOUNTER
Attempted to reach patient about message below, message said this wireless caller does not accept calls at this time. Will try again later.

## 2020-08-17 NOTE — TELEPHONE ENCOUNTER
Patient returned the call, she stated she just needs her test strips & januvia refill, looks like she has refills for both at [facility] so instructed her to call them.

## 2020-08-19 NOTE — TELEPHONE ENCOUNTER
Summary:    Patient is due/failing the following:   Eye exam     Action needed:   Schedule an eye exam    Type of outreach:    Phone, left message for patient to call back.     Questions for provider review:    None                                                                                                                                    Makenzie Cohen CMA       Chart routed to Care Team .

## 2020-08-26 NOTE — LETTER
Dear Romina,    I have been unsuccessful in reaching you since our last contact. At this time the Care Coordination team will make no further attempts to reach you, however this does not change your ability to continue receiving care from your providers at your primary care clinic. If you need additional support from a care coordinator in the future please contact me at [phone].    All of us at [facility] are invested in your health and are here to assist you in meeting your goals.     Sincerely,    REGGIE Hidalgo RN Clinic Care Coordinator   [place], [place], [place]  Phone: [phone]

## 2020-08-26 NOTE — PROGRESS NOTES
Clinic Care Coordination Contact  [facility]     Clinical Data: Care Coordinator Outreach  Outreach attempted x 3 and 4 (see 8/11 out reach encounter for previous attempts).   -CC RN was unable to leave a VM message. No VM option on home phone.   -Called cell, and was able to leave a VM.     Plan: Care Coordinator will send disenrollment letter with care coordinator contact information via [employer]. Care Coordinator will do no further outreaches at this time.    REGGIE Hidalgo RN Clinic Care Coordinator   [place], [place], [place]  Phone: [phone]

## 2020-08-27 NOTE — PROGRESS NOTES
No return call from patient.     FYI to PCP. Patient dis enrolled due to unable to contact.    BRETT HidalgoN RN Clinic Care Coordinator   [place], [place], [place]  Phone: [phone]

## 2020-08-30 NOTE — TELEPHONE ENCOUNTER
Patient was no show for her DM Education appointment please see if she can reschedule this I feel it is very important for her DM management.       Liz Richard, MELE CNP  Questions or concerns please feel free to send me a [employer] message or call me  Phone : [phone]    n

## 2020-08-31 NOTE — TELEPHONE ENCOUNTER
Patient was no show for her DM Education appointment please see if she can reschedule this I feel it is very important for her DM management.       MELE Howard CNP  Questions or concerns please feel free to send me a [employer] message or call me  Phone : [phone]

## 2020-09-04 NOTE — TELEPHONE ENCOUNTER
Would need visit. Who prescribed this? Was it by Dr. Reese? RN's I would triage this why is she requesting a diuretic.       MELE Howard CNP  Questions or concerns please feel free to send me a [employer] message or call me  Phone : [phone]

## 2020-09-08 NOTE — TELEPHONE ENCOUNTER
Please forward to prescribing provider to address      MELE Howard CNP  Questions or concerns please feel free to send me a [employer] message or call me  Phone : [phone]

## 2020-09-08 NOTE — TELEPHONE ENCOUNTER
Routed to Liz Richard as an FYI  Spoke to patient regarding mychart message. Patient stated that she doesn't know why she is taking the Spironolactone. Patient kept telling  that she is that she takes this medication with her Furosamide in the morning. She doesn't understand why it was discontinued. Scheduled virtual visit with Liz Richard to discuss further. 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      (oncologist) is the provider who was prescribing the medication. Writer doesn't see any visit documentation from  in chart. Patient would like to have medication filled by Liz going forward, due to location.     Babar Awad RN  September 8, 2020

## 2020-09-10 NOTE — TELEPHONE ENCOUNTER
Called and spoke with patient regarding message below.  Patient verbalized understanding.  Nicole Mills CMA ([facility])

## 2020-09-10 NOTE — TELEPHONE ENCOUNTER
The spironolactone was not prescribed by me it was prescribed by Dr. Reese. I am not certain why she is on that and the Lasix,. She should reach out to his office for this.   Is there other things she would like to discuss?     Liz Richard, MELE CNP  Questions or concerns please feel free to send me a [employer] message or call me  Phone : [phone]

## 2020-09-24 NOTE — TELEPHONE ENCOUNTER
Call back to patient letting  pt know one month supply of furosemide was sent to pharmacy. Pt last OV was 11/2018, currently scheduled for 10/21/2020. Pt will get labs and ultrasound prior at [facility], orders are in.    Delores Rico LPN  Hepatology Clinic    ------------  [facility]    Phone Message    May a detailed message be left on voicemail: yes     Reason for Call: Medication Refill Request    Has the patient contacted the pharmacy for the refill? Yes   Name of medication being requested: furosemide (LASIX) 40 MG tablet    Provider who prescribed the medication: Dr. Reese  Pharmacy: [facility].  Date medication is needed: asap. Pt would like to have it today since she has run out.       Action Taken: Message routed to:  [facility] ([facility]): hep    Travel Screening: Not Applicable      [facility]    Phone Message    May a detailed message be left on voicemail: yes     Reason for Call: Order(s): Other:   Reason for requested: Pt is requesting for their lab orders to be sent to the [facility].  Date needed: asap  Provider name: Dr. Reese      Action Taken: Message routed to:  [facility] ([facility]): hep    Travel Screening: Not Applicable

## 2020-10-01 NOTE — PROGRESS NOTES
Answers for HPI/ROS submitted by the patient on 5/8/2018   If you checked off any problems, how difficult have these problems made it for you to do your work, take care of things at home, or get along with other people?: Somewhat difficult  PHQ9 TOTAL SCORE: 7  IDALIA 7 TOTAL SCORE: 7     Patient requesting Ventolin inhaler instead of Proair. She states Proair burns her throat and she refuses to use it. Pt only has 3 doses left in her Ventolin inhaler. If approved, please send Rx to [facility] in [place].    Raisa Subramanian RN  [phone]

## 2020-10-30 NOTE — TELEPHONE ENCOUNTER
Followed by endocrinology will forward.       MELE Howard CNP  Questions or concerns please feel free to send me a [employer] message or call me  Phone : [phone]

## 2020-10-30 NOTE — TELEPHONE ENCOUNTER
"Requested Prescriptions   Pending Prescriptions Disp Refills     ferrous gluconate (FERGON) 324 (38 Fe) MG tablet 60 tablet 0     Sig: Take 1 tablet (324 mg) by mouth daily (with breakfast)       Iron Supplements Passed - 10/27/2020  5:33 PM        Passed - Patient is 12 years of age or older        Passed - Recent (12 mo) or future (30 days) visit within the authorizing provider's specialty     Patient has had an office visit with the authorizing provider or a provider within the authorizing providers department within the previous 12 mos or has a future within next 30 days. See \"Patient Info\" tab in inbasket, or \"Choose Columns\" in Meds & Orders section of the refill encounter.              Passed - Hgb OR Hct on record within the past 12 mos.     Patient need only have had a HGB or HCT on file in the past 12 mos. That result does not need to be normal.    Recent Labs   Lab Test 05/14/20  1414 10/31/18  0912 02/09/18  0930   HGB 13.8 14.2 14.6     Recent Labs   Lab Test 05/14/20  1414 10/31/18  0912 02/09/18 0930   HCT 40.4 39.7 43.3       Please verify a HGB or HCT has been checked SINCE THE LAST DOSE CHANGE.            Passed - Medication is active on med list           hydrOXYzine (ATARAX) 25 MG tablet 60 tablet 0     Sig: Take 1-2 tablets (25-50 mg) by mouth every 6 hours as needed for itching Do not take while driving or operating machinery due to sedation.       Antihistamines Protocol Passed - 10/27/2020  5:33 PM        Passed - Recent (12 mo) or future (30 days) visit within the authorizing provider's specialty     Patient has had an office visit with the authorizing provider or a provider within the authorizing providers department within the previous 12 mos or has a future within next 30 days. See \"Patient Info\" tab in inbasket, or \"Choose Columns\" in Meds & Orders section of the refill encounter.              Passed - Patient is age 3 or older     Apply age and/or weight-based dosing for peds patients " "age 3 and older.    Forward request to provider for patients under the age of 3.          Passed - Medication is active on med list           sertraline (ZOLOFT) 100 MG tablet 90 tablet 0     Sig: Take 1 tablet (100 mg) by mouth daily       SSRIs Protocol Passed - 10/27/2020  5:33 PM        Passed - Recent (12 mo) or future (30 days) visit within the authorizing provider's specialty     Patient has had an office visit with the authorizing provider or a provider within the authorizing providers department within the previous 12 mos or has a future within next 30 days. See \"Patient Info\" tab in inbasket, or \"Choose Columns\" in Meds & Orders section of the refill encounter.              Passed - Medication is active on med list        Passed - Patient is age 18 or older        Passed - No active pregnancy on record        Passed - No positive pregnancy test in last 12 months           traZODone (DESYREL) 50 MG tablet 90 tablet 0     Sig: Take 1 tablet (50 mg) by mouth At Bedtime       Serotonin Modulators Passed - 10/27/2020  5:33 PM        Passed - Recent (12 mo) or future (30 days) visit within the authorizing provider's specialty     Patient has had an office visit with the authorizing provider or a provider within the authorizing providers department within the previous 12 mos or has a future within next 30 days. See \"Patient Info\" tab in inbasket, or \"Choose Columns\" in Meds & Orders section of the refill encounter.              Passed - Medication is active on med list        Passed - Patient is age 18 or older        Passed - No active pregnancy on record        Passed - No positive pregnancy test in past 12 months           blood glucose (NO BRAND SPECIFIED) test strip 100 strip 5     Sig: Use to test blood sugar 3 times daily or as directed.       Diabetic Supplies Protocol Passed - 10/27/2020  5:33 PM        Passed - Medication is active on med list        Passed - Patient is 18 years of age or older        " "Passed - Recent (6 mo) or future (30 days) visit within the authorizing provider's specialty     Patient had office visit in the last 6 months or has a visit in the next 30 days with authorizing provider.  See \"Patient Info\" tab in inbasket, or \"Choose Columns\" in Meds & Orders section of the refill encounter.                 Prescription approved per [facility] Refill Protocol.    Vignesh Calderon RN, BSN    "

## 2020-10-30 NOTE — TELEPHONE ENCOUNTER
"Requested Prescriptions   Pending Prescriptions Disp Refills     metFORMIN (GLUCOPHAGE) 500 MG tablet 360 tablet 11     Sig: Start 2 tablet daily with breakfast and 2 tablets with dinner       Biguanide Agents Failed - 10/27/2020  5:33 PM        Failed - Patient has documented A1c within the specified period of time.     If HgbA1C is 8 or greater, it needs to be on file within the past 3 months.  If less than 8, must be on file within the past 6 months.     Recent Labs   Lab Test 05/19/20  1327   A1C 12.9*             Passed - Patient is age 10 or older        Passed - Patient's CR is NOT>1.4 OR Patient's EGFR is NOT<45 within past 12 mos.     Recent Labs   Lab Test 07/08/20  1044   GFRESTIMATED 78   GFRESTBLACK >90       Recent Labs   Lab Test 07/08/20  1044   CR 0.75             Passed - Patient does NOT have a diagnosis of CHF.        Passed - Medication is active on med list        Passed - Patient is not pregnant        Passed - Patient has not had a positive pregnancy test within the past 12 mos.         Passed - Recent (6 mo) or future (30 days) visit within the authorizing provider's specialty     Patient had office visit in the last 6 months or has a visit in the next 30 days with authorizing provider or within the authorizing provider's specialty.  See \"Patient Info\" tab in inbasket, or \"Choose Columns\" in Meds & Orders section of the refill encounter.               Routing refill request to provider for review/approval because:  Labs out of range:  HBG > 8 and older than 3 months old    Vignesh Calderon RN, BSN          "

## 2020-11-04 NOTE — TELEPHONE ENCOUNTER
Patient is scheduled for 12/7/20 at 2:30pm for a video visit.    Tamra Camarena CMA  Adult Endocrinology  [facility]

## 2020-11-04 NOTE — TELEPHONE ENCOUNTER
----- Message from Alyce Salazar MD sent at 11/4/2020  2:29 PM CST -----  Regarding: follow up  Please ask pt to make follow up appointment with me in either December or Novemeber    Thanks    Alyce Salazar MD  Endocrinology and Diabetes    Pager [phone]
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
## 2020-11-19 NOTE — PROGRESS NOTES
"Subjective     Romina Chavarria is a 75 year old female who presents to clinic today for the following health issues:    HPI         Concern - Right upper arm swollen and bruised.  She was moving and was pulling a dolley of heavy totes and felt a burning sensation in her right upper arm. She did not really look at the area until she was getting ready for bed and that is when she noticed the bruising and swelling  Onset: 11/15  Description: swollen and bruised right bicep   Intensity: mild  Progression of Symptoms:  better  Accompanying Signs & Symptoms: none  Previous history of similar problem: no  Precipitating factors:        Worsened by: none  Alleviating factors:        Improved by: none  Therapies tried and outcome: pt has tried asper cream once and it does not feel as sore as it was    Review of Systems   Constitutional, HEENT, cardiovascular, pulmonary, gi and gu systems are negative, except as otherwise noted.      Objective    /70   Pulse 90   Temp 98.4  F (36.9  C) (Temporal)   Resp 14   Wt 60 kg (132 lb 4 oz)   SpO2 97%   BMI 27.64 kg/m    Body mass index is 27.64 kg/m .  Physical Exam   GENERAL: healthy, alert and no distress  MS: significant bruising present around the bicep of the right arm. \"pete\" appearance and decrease of strength present today. Very mild tenderness. Radial pulse intact.   SKIN: no suspicious lesions or rashes  PSYCH: mentation appears normal, affect normal/bright    No results found for this or any previous visit (from the past 24 hour(s)).        Assessment & Plan     Injury of right upper arm, initial encounter  Symptoms certainly concerning for biceps rupture. Will have patient follow-up with orthopedics to discuss management options.   - Orthopedic & Spine  Referral; Future     The patient indicates understanding of these issues and agrees with the plan.    Abbey Colon PA-C  [facility]    "

## 2020-11-20 NOTE — PATIENT INSTRUCTIONS
-Ice or heat 15-20 minutes as needed (Avoid sleeping on a heating pad or ice)  -Patient's preferred over the counter medications as directed on packaging as needed for pain or soreness.  -Over the counter lidocaine cream or Voltaren gel as needed.      -Follow up as needed if symptoms fail to improve or worsen.  Please call with questions or concerns.
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
## 2020-12-07 NOTE — PROGRESS NOTES
Endocrinology and Diabetes Clinic       Interval history:  Romina Chavarria aged 75 year old years old woman is here for T2DM follow up comorbidity of chronic hepatitis C  The patient has moved into an apartment, she is very stressed, has not been cooking, eating out more  Upper arm injury right resulting in biceps tendon rupture  In 10/20 was admitted for sepsis  With all this has lost   Medications Metformin 500 mg 2 tiwce daily, Januvia 100 mg once daily  SMBG has been eating a lot of fast food:check twice daily  mg/dl   Diet checks twice daily  Devices meter  Hypoglycemia none    Feet are doing well no numbness no tingling  No chest pain  Needs to make an appointment for eye exam      Physical activity gardening, fish pond weeding  Has been eating tons of fruit, has an orange and an apple in the evening  Has lost 40 lbs over the last 2 years with dietary changes and also being sick  Complications: no numbness no tingling, urine albumin elevated x 1  Last eye exam overdue, she needs to make appointment  Feet no numbness no tingling  No chest pain, no SOB    SMBG check twice daily, fasting , bedtime 140-180  Cardiovascular events: blood pressure normal, on ASA, LDL below 70 without statins    Bone density: 7/8/20: DXA T-score -1.7 mean hip, FRAX increased; fractured arm 15 years ago falling on stairs, lost 3.5 inches, rare diarrhea; no FH of osteoporosis nor fractures      Assessment:  T2DM  Elderly woman with type 2 diabetes with chronic hepatitis and good blood glucose control on metformin and Januvia. She will have new insurance in the new year which is going to cover Januvia better. BGs well controlled with weight loss  Is due for eye exam    Low bone density with history of forearm fracture with increased fracture risk  FRAX is elevated, treatment is indicated, pt get Calcium from diet including Calcium and supplements, will eval labs, needs to see dentist, rec to start Reclast infusion at next  visti      Plan:   Continue metformin and Januvia  Cont check BGs twice daily    Blood work    Dentist for routine check up (prior to Reclast start) overday  Eye exam for routine check up for DM    Blood pressure is well controlled without medications  Urine albumin was elevated once in setting of very high A1c at time of diagnosis, will recheck, if elevated 3 times will need to start low-dose ACE inhibitor or ARB  Good cholesterol, not on statin  Feet- asymptomatic  Follow-up with me in 3 months    This was a 25 min visit of which more than 23 minutes were spent in counseling in regards to diagnosis, clinical consequences and treatment indications and options of diabetes care    Alyce Salazar MD  Endocrinology and Diabetes    Pager [phone]     Medications:   Current Outpatient Medications   Medication Sig Dispense Refill     blood glucose (NO BRAND SPECIFIED) lancets standard Use to test blood sugar 3 times daily or as directed. 100 each 5     blood glucose (NO BRAND SPECIFIED) test strip Use to test blood sugar 3 times daily or as directed. 100 strip 5     blood glucose monitoring (NO BRAND SPECIFIED) meter device kit Use to test blood sugar 3 times daily or as directed. 1 kit 0     Coenzyme Q10 (CO Q 10 PO) Take 1 capsule by mouth daily       ferrous gluconate (FERGON) 324 (38 Fe) MG tablet Take 1 tablet (324 mg) by mouth daily (with breakfast) 60 tablet 0     furosemide (LASIX) 40 MG tablet Take 1 tablet (40 mg) by mouth daily 90 tablet 0     hydrOXYzine (ATARAX) 25 MG tablet Take 1-2 tablets (25-50 mg) by mouth every 6 hours as needed for itching Do not take while driving or operating machinery due to sedation. 60 tablet 0     metFORMIN (GLUCOPHAGE) 500 MG tablet Start 2 tablet daily with breakfast and 2 tablets with dinner 360 tablet 11     potassium chloride ER (KLOR-CON) 20 MEQ CR tablet Take 1 tablet (20 mEq) by mouth 2 times daily 60 tablet 0     sertraline (ZOLOFT) 100 MG tablet Take 1  tablet (100 mg) by mouth daily 90 tablet 0     sitagliptin (JANUVIA) 100 MG tablet Take 1 tablet (100 mg) by mouth daily 90 tablet 3     sitagliptin (JANUVIA) 100 MG tablet Take 1 tablet (100 mg) by mouth daily 90 tablet 3     spironolactone (ALDACTONE) 50 MG tablet Take 1 tablet (50 mg) by mouth daily 90 tablet 0     traZODone (DESYREL) 50 MG tablet Take 1 tablet (50 mg) by mouth At Bedtime 90 tablet 0       Social History:  Social History     Tobacco Use     Smoking status: Never Smoker     Smokeless tobacco: Never Used     Tobacco comment: no passive exposure at home   Substance Use Topics     Alcohol use: No     Alcohol/week: 0.0 standard drinks       Physical Examination:  There were no vitals taken for this visit.  There is no height or weight on file to calculate BMI.    Wt Readings from Last 4 Encounters:   11/20/20 60 kg (132 lb 4 oz)   11/19/20 60 kg (132 lb 4 oz)   06/30/20 65.3 kg (144 lb)   11/07/18 79.9 kg (176 lb 3.2 oz)        Reported vitals:  There were no vitals taken for this visit.   healthy, alert and no distress  PSYCH: Alert and oriented times 3; coherent speech, normal   rate and volume, able to articulate logical thoughts, able   to abstract reason, no tangential thoughts, no hallucinations   or delusions  Her affect is normal and pleasant  RESP: No cough, no audible wheezing, able to talk in full sentences  Remainder of exam unable to be completed due to telephone visits     Labs and Studies:   Lab Results   Component Value Date     (L) 05/14/2020    CHLORIDE 94 05/14/2020    CO2 23 05/14/2020     (H) 07/08/2020    CR 0.75 07/08/2020    CR 0.55 05/14/2020    CR 0.84 10/31/2018    CR 0.78 02/09/2018    CR 0.75 10/24/2017    TOYA 8.5 05/14/2020    MAG 2.3 12/14/2003    ALBUMIN 3.1 (L) 05/14/2020    ALKPHOS 113 05/14/2020    LDL 51 05/18/2020    HDL 78 05/18/2020    TRIG 76 05/18/2020     Lab Results   Component Value Date    MICROL 25 07/08/2020    MICROL 23 05/19/2020    MICROL  14 11/20/2007     Lab Results   Component Value Date    A1C 12.9 (H) 05/19/2020    A1C 5.1 02/12/2014    A1C 5.6 05/11/2009    A1C 5.7 02/25/2008      Ref Range & Units 1mo ago   WHITE BLOOD COUNT         4.5 - 11.0 thou/cu mm 7.8    RED BLOOD COUNT           4.00 - 5.20 mil/cu mm 4.35    HEMOGLOBIN                12.0 - 16.0 g/dL 14.2    HEMATOCRIT                33.0 - 51.0 % 39.4    MCV                       80 - 100 fL 91    MCH                       26.0 - 34.0 pg 32.6    MCHC                      32.0 - 36.0 g/dL 36.0    RDW                       11.5 - 15.5 % 14.2    PLATELET COUNT            140 - 440 thou/cu mm 83Low     MPV                       6.5 - 11.0 fL 12.5High     Resulting [facility]   Specimen Collected: 10/24/20  5:35 AM Last Resulted: 10/24/20  6:17 AM      Ref Range & Units 1mo ago   PHOSPHORUS 2.3 - 4.7 mg/dL 2.4    Resulting [facility]   Specimen Collected: 10/24/20  5:35 AM Last Resulted: 10/24/20  6:36 AM      Ref Range & Units 1mo ago   SODIUM 135 - 145 mmol/L 136    POTASSIUM 3.5 - 5.0 mmol/L 4.2    CHLORIDE 98 - 110 mmol/L 110    CO2,TOTAL 21 - 31 mmol/L 16Low     ANION GAP 5 - 18  10    GLUCOSE 65 - 100 mg/dL 157High     CALCIUM 8.5 - 10.5 mg/dL 8.0Low     BUN 8 - 25 mg/dL 10    CREATININE 0.57 - 1.11 mg/dL 0.63    BUN/CREAT RATIO           10 - 20  16    GFR if African American >60 ml/min/1.73m2 >60    GFR if not African American >60 ml/min/1.73m2 >60    ALBUMIN 3.2 - 4.6 g/dL 2.6Low     PROTEIN,TOTAL 6.0 - 8.0 g/dL 5.2Low     GLOBULIN                  2.0 - 3.7 g/dL 2.6    A/G RATIO 1.0 - 2.0  1.0    BILIRUBIN,TOTAL 0.2 - 1.2 mg/dL 1.3High     ALK PHOSPHATASE 50 - 136 IU/L 111    ALT (SGPT) 8 - 45 IU/L 62High     AST (SGOT) 2 - 40 IU/L 54High     Resulting Agency  [facility]      Ref Range & Units 5mo ago      Glutamic Acid Decarboxylase Antibody 0.0 - 5.0 IU/mL <5.0    Comment: (Note)   INTERPRETIVE INFORMATION:  Glutamic Acid  Decarboxylase   Antibody   A value greater than 5.0 IU/mL is considered positive for   Glutamic Acid Decarboxylase Antibody (IDALIA Ab). This assay   is intended for the semi-quantitative determination of the   IDALIA Ab in human serum. Results should be interpreted within   the context of clinical symptoms.   Performed By: [facility]   [address] [place] [id number]   : Donnie Bain MD, MS    Resulting Agency   [facility]         Specimen Collected: 07/08/20 10:44 AM         Ref Range & Units 5mo ago     Creatinine Urine mg/dL 125     Albumin Urine mg/L mg/L 25     Albumin Urine mg/g Cr 0 - 25 mg/g Cr 20.08    Resulting Agency  [facility]         Specimen Collected: 07/08/20 11:43 AM   Last Resulted: 07/08/20 12:46 PM            How would you like to obtain your AVS? Lazaro    Phone call duration: 25 minutes

## 2020-12-07 NOTE — PATIENT INSTRUCTIONS
Goal of glucose readings:  morning  mg/dl  Before eating   Before bedtime/ 1-2 hours after eating 120-160 mg/dl     Do blood work soon    I recommend to see Dentist for routine exam (important for treating your bones)    I recommend to see Ophthalmologist for routing exam    Continue Metformin and jardiance

## 2020-12-07 NOTE — LETTER
12/7/2020         RE: Romina Chavarria  [id number] [facility] [address]        Dear Colleague,    Thank you for referring your patient, Romina Chavarria, to the [facility]. Please see a copy of my visit note below.      [facility]       Interval history:  Romina Chavarria aged 75 year old years old woman is here for T2DM follow up comorbidity of chronic hepatitis C  The patient has moved into an apartment, she is very stressed, has not been cooking, eating out more  Upper arm injury right resulting in biceps tendon rupture  In 10/20 was admitted for sepsis  With all this has lost   Medications Metformin 500 mg 2 tiwce daily, Januvia 100 mg once daily  SMBG has been eating a lot of fast food:check twice daily  mg/dl   Diet checks twice daily  Devices meter  Hypoglycemia none    Feet are doing well no numbness no tingling  No chest pain  Needs to make an appointment for eye exam      Physical activity gardening, fish pond weeding  Has been eating tons of fruit, has an orange and an apple in the evening  Has lost 40 lbs over the last 2 years with dietary changes and also being sick  Complications: no numbness no tingling, urine albumin elevated x 1  Last eye exam overdue, she needs to make appointment  Feet no numbness no tingling  No chest pain, no SOB    SMBG check twice daily, fasting , bedtime 140-180  Cardiovascular events: blood pressure normal, on ASA, LDL below 70 without statins    Bone density: 7/8/20: DXA T-score -1.7 mean hip, FRAX increased; fractured arm 15 years ago falling on stairs, lost 3.5 inches, rare diarrhea; no FH of osteoporosis nor fractures      Assessment:  T2DM  Elderly woman with type 2 diabetes with chronic hepatitis and good blood glucose control on metformin and Januvia. She will have new insurance in the new year which is going to cover Januvia better. BGs well controlled with weight loss  Is due for eye exam    Low bone density  with history of forearm fracture with increased fracture risk  FRAX is elevated, treatment is indicated, pt get Calcium from diet including Calcium and supplements, will eval labs, needs to see dentist, rec to start Reclast infusion at next visti      Plan:   Continue metformin and Januvia  Cont check BGs twice daily    Blood work    Dentist for routine check up (prior to Reclast start) overday  Eye exam for routine check up for DM    Blood pressure is well controlled without medications  Urine albumin was elevated once in setting of very high A1c at time of diagnosis, will recheck, if elevated 3 times will need to start low-dose ACE inhibitor or ARB  Good cholesterol, not on statin  Feet- asymptomatic  Follow-up with me in 3 months    This was a 25 min visit of which more than 23 minutes were spent in counseling in regards to diagnosis, clinical consequences and treatment indications and options of diabetes care    Alyce Salazar MD  Endocrinology and Diabetes    Pager [phone]     Medications:   Current Outpatient Medications   Medication Sig Dispense Refill     blood glucose (NO BRAND SPECIFIED) lancets standard Use to test blood sugar 3 times daily or as directed. 100 each 5     blood glucose (NO BRAND SPECIFIED) test strip Use to test blood sugar 3 times daily or as directed. 100 strip 5     blood glucose monitoring (NO BRAND SPECIFIED) meter device kit Use to test blood sugar 3 times daily or as directed. 1 kit 0     Coenzyme Q10 (CO Q 10 PO) Take 1 capsule by mouth daily       ferrous gluconate (FERGON) 324 (38 Fe) MG tablet Take 1 tablet (324 mg) by mouth daily (with breakfast) 60 tablet 0     furosemide (LASIX) 40 MG tablet Take 1 tablet (40 mg) by mouth daily 90 tablet 0     hydrOXYzine (ATARAX) 25 MG tablet Take 1-2 tablets (25-50 mg) by mouth every 6 hours as needed for itching Do not take while driving or operating machinery due to sedation. 60 tablet 0     metFORMIN (GLUCOPHAGE) 500  MG tablet Start 2 tablet daily with breakfast and 2 tablets with dinner 360 tablet 11     potassium chloride ER (KLOR-CON) 20 MEQ CR tablet Take 1 tablet (20 mEq) by mouth 2 times daily 60 tablet 0     sertraline (ZOLOFT) 100 MG tablet Take 1 tablet (100 mg) by mouth daily 90 tablet 0     sitagliptin (JANUVIA) 100 MG tablet Take 1 tablet (100 mg) by mouth daily 90 tablet 3     sitagliptin (JANUVIA) 100 MG tablet Take 1 tablet (100 mg) by mouth daily 90 tablet 3     spironolactone (ALDACTONE) 50 MG tablet Take 1 tablet (50 mg) by mouth daily 90 tablet 0     traZODone (DESYREL) 50 MG tablet Take 1 tablet (50 mg) by mouth At Bedtime 90 tablet 0       Social History:  Social History     Tobacco Use     Smoking status: Never Smoker     Smokeless tobacco: Never Used     Tobacco comment: no passive exposure at home   Substance Use Topics     Alcohol use: No     Alcohol/week: 0.0 standard drinks       Physical Examination:  There were no vitals taken for this visit.  There is no height or weight on file to calculate BMI.    Wt Readings from Last 4 Encounters:   11/20/20 60 kg (132 lb 4 oz)   11/19/20 60 kg (132 lb 4 oz)   06/30/20 65.3 kg (144 lb)   11/07/18 79.9 kg (176 lb 3.2 oz)        Reported vitals:  There were no vitals taken for this visit.   healthy, alert and no distress  PSYCH: Alert and oriented times 3; coherent speech, normal   rate and volume, able to articulate logical thoughts, able   to abstract reason, no tangential thoughts, no hallucinations   or delusions  Her affect is normal and pleasant  RESP: No cough, no audible wheezing, able to talk in full sentences  Remainder of exam unable to be completed due to telephone visits     Labs and Studies:   Lab Results   Component Value Date     (L) 05/14/2020    CHLORIDE 94 05/14/2020    CO2 23 05/14/2020     (H) 07/08/2020    CR 0.75 07/08/2020    CR 0.55 05/14/2020    CR 0.84 10/31/2018    CR 0.78 02/09/2018    CR 0.75 10/24/2017    TOYA 8.5  05/14/2020    MAG 2.3 12/14/2003    ALBUMIN 3.1 (L) 05/14/2020    ALKPHOS 113 05/14/2020    LDL 51 05/18/2020    HDL 78 05/18/2020    TRIG 76 05/18/2020     Lab Results   Component Value Date    MICROL 25 07/08/2020    MICROL 23 05/19/2020    MICROL 14 11/20/2007     Lab Results   Component Value Date    A1C 12.9 (H) 05/19/2020    A1C 5.1 02/12/2014    A1C 5.6 05/11/2009    A1C 5.7 02/25/2008      Ref Range & Units 1mo ago   WHITE BLOOD COUNT         4.5 - 11.0 thou/cu mm 7.8    RED BLOOD COUNT           4.00 - 5.20 mil/cu mm 4.35    HEMOGLOBIN                12.0 - 16.0 g/dL 14.2    HEMATOCRIT                33.0 - 51.0 % 39.4    MCV                       80 - 100 fL 91    MCH                       26.0 - 34.0 pg 32.6    MCHC                      32.0 - 36.0 g/dL 36.0    RDW                       11.5 - 15.5 % 14.2    PLATELET COUNT            140 - 440 thou/cu mm 83Low     MPV                       6.5 - 11.0 fL 12.5High     [facility]   Specimen Collected: 10/24/20  5:35 AM Last Resulted: 10/24/20  6:17 AM      Ref Range & Units 1mo ago   PHOSPHORUS 2.3 - 4.7 mg/dL 2.4    [facility]   Specimen Collected: 10/24/20  5:35 AM Last Resulted: 10/24/20  6:36 AM      Ref Range & Units 1mo ago   SODIUM 135 - 145 mmol/L 136    POTASSIUM 3.5 - 5.0 mmol/L 4.2    CHLORIDE 98 - 110 mmol/L 110    CO2,TOTAL 21 - 31 mmol/L 16Low     ANION GAP 5 - 18  10    GLUCOSE 65 - 100 mg/dL 157High     CALCIUM 8.5 - 10.5 mg/dL 8.0Low     BUN 8 - 25 mg/dL 10    CREATININE 0.57 - 1.11 mg/dL 0.63    BUN/CREAT RATIO           10 - 20  16    GFR if African American >60 ml/min/1.73m2 >60    GFR if not African American >60 ml/min/1.73m2 >60    ALBUMIN 3.2 - 4.6 g/dL 2.6Low     PROTEIN,TOTAL 6.0 - 8.0 g/dL 5.2Low     GLOBULIN                  2.0 - 3.7 g/dL 2.6    A/G RATIO 1.0 - 2.0  1.0    BILIRUBIN,TOTAL 0.2 - 1.2 mg/dL 1.3High     ALK PHOSPHATASE 50 - 136 IU/L 111    ALT (SGPT) 8  "- 45 IU/L 62High     AST (SGOT) 2 - 40 IU/L 54High     Resulting Agency  [facility]      Ref Range & Units 5mo ago      Glutamic Acid Decarboxylase Antibody 0.0 - 5.0 IU/mL <5.0    Comment: (Note)   INTERPRETIVE INFORMATION:  Glutamic Acid Decarboxylase   Antibody   A value greater than 5.0 IU/mL is considered positive for   Glutamic Acid Decarboxylase Antibody (IDALIA Ab). This assay   is intended for the semi-quantitative determination of the   IDALIA Ab in human serum. Results should be interpreted within   the context of clinical symptoms.   Performed By: [facility]   [address] [place] [id number]   : Donnie Bain MD, MS    Resulting Agency   [facility]         Specimen Collected: 20 10:44 AM         Ref Range & Units 5mo ago     Creatinine Urine mg/dL 125     Albumin Urine mg/L mg/L 25     Albumin Urine mg/g Cr 0 - 25 mg/g Cr 20.08    Resulting Agency  [facility]         Specimen Collected: 20 11:43 AM   Last Resulted: 20 12:46 PM            How would you like to obtain your AVS? MyChart    Phone call duration: 25 minutes    Blood sugar readings per patient  - 176   102, 82  5- 151, 203  - 151-130  /3- 128   - \"did not check as battery \"  -153, 165      Romina Chavarria is a 75 year old female who is being evaluated via a billable telephone visit.      The patient has been notified of following:     \"This telephone visit will be conducted via a call between you and your physician/provider. We have found that certain health care needs can be provided without the need for a physical exam.  This service lets us provide the care you need with a short phone conversation.  If a prescription is necessary we can send it directly to your pharmacy.  If lab work is needed we can place an order for that and you can then stop by our lab to have the test done at a later time.    Telephone visits are billed at " "different rates depending on your insurance coverage. During this emergency period, for some insurers they may be billed the same as an in-person visit.  Please reach out to your insurance provider with any questions.    If during the course of the call the physician/provider feels a telephone visit is not appropriate, you will not be charged for this service.\"    Patient has given verbal consent for Telephone visit?  Yes    What phone number would you like to be contacted at? [phone]    How would you like to obtain your AVS? MyChart    Phone call duration: 30 minutes    .   Alyce Salazar MD  Endocrinology and Diabetes    Pager [phone]              Again, thank you for allowing me to participate in the care of your patient.        Sincerely,        Alyce Salazar MD    "

## 2020-12-07 NOTE — PROGRESS NOTES
"Blood sugar readings per patient  - 176   102, 82  - 151, 203  - 151-130  3- 128   - \"did not check as battery \"  -153, 165      Romina Chavarria is a 75 year old female who is being evaluated via a billable telephone visit.      The patient has been notified of following:     \"This telephone visit will be conducted via a call between you and your physician/provider. We have found that certain health care needs can be provided without the need for a physical exam.  This service lets us provide the care you need with a short phone conversation.  If a prescription is necessary we can send it directly to your pharmacy.  If lab work is needed we can place an order for that and you can then stop by our lab to have the test done at a later time.    Telephone visits are billed at different rates depending on your insurance coverage. During this emergency period, for some insurers they may be billed the same as an in-person visit.  Please reach out to your insurance provider with any questions.    If during the course of the call the physician/provider feels a telephone visit is not appropriate, you will not be charged for this service.\"    Patient has given verbal consent for Telephone visit?  Yes    What phone number would you like to be contacted at? [phone]    How would you like to obtain your AVS? [place]    Phone call duration: 30 minutes    .   Alyce Salazar MD  Endocrinology and Diabetes    Pager [phone]          "

## 2020-12-22 NOTE — TELEPHONE ENCOUNTER
Looks like this is a hospital follow up, we can do virtual and then if we need to get labs we can do another day.       MELE Howard CNP  Questions or concerns please feel free to send me a [employer] message or call me  Phone : [phone]

## 2020-12-22 NOTE — TELEPHONE ENCOUNTER
I changed visit to virtual. See below for patient. RN please be sure patient is safe and doing well since discharge and able to do telephone visit tomorrow.       MELE Howard CNP  Questions or concerns please feel free to send me a [employer] message or call me  Phone : [phone]

## 2020-12-22 NOTE — TELEPHONE ENCOUNTER
Lm to call us back to change to a virtual visit tomorrow at 4pm... and also transfer to RN to make sure she is stable when call back.

## 2020-12-23 PROBLEM — R65.21 SEPTIC SHOCK (H): Status: ACTIVE | Noted: 2020-01-01

## 2020-12-23 PROBLEM — K86.89 PANCREATIC MASS: Status: ACTIVE | Noted: 2020-01-01

## 2020-12-23 PROBLEM — F31.9 BIPOLAR AFFECTIVE (H): Status: ACTIVE | Noted: 2020-01-01

## 2020-12-23 PROBLEM — A41.9 SEPTIC SHOCK (H): Status: ACTIVE | Noted: 2020-01-01

## 2020-12-23 PROBLEM — N17.9 ACUTE RENAL FAILURE (ARF) (H): Status: ACTIVE | Noted: 2020-01-01

## 2020-12-23 PROBLEM — F32.9 MAJOR DEPRESSIVE DISORDER: Status: ACTIVE | Noted: 2020-01-01

## 2020-12-23 NOTE — TELEPHONE ENCOUNTER
Patient called us back. I was able to schedule a phone visit with Liz Richard today. Patient has been having issues with her phone. She can see we are calling, but she can't answer the phone. The general number was put into her memory on her phone, then I called her back to see if she could answer. She was able to.   We formulated a plan if for some reason she is unable to contact us. She will call us at 4:15pm.     Informed provider of this.     Babar Awad RN  December 23, 2020

## 2020-12-23 NOTE — TELEPHONE ENCOUNTER
RN TRIAGE CALL:    Patient Contact    Attempt # 3    Was call answered?  No.  Left message on voicemail with information to call me back.

## 2020-12-23 NOTE — PROGRESS NOTES
"Romina Chavarria is a 75 year old female who is being evaluated via a billable telephone visit.      The patient has been notified of following:     \"This telephone visit will be conducted via a call between you and your physician/provider. We have found that certain health care needs can be provided without the need for a physical exam.  This service lets us provide the care you need with a short phone conversation.  If a prescription is necessary we can send it directly to your pharmacy.  If lab work is needed we can place an order for that and you can then stop by our lab to have the test done at a later time.    Telephone visits are billed at different rates depending on your insurance coverage. During this emergency period, for some insurers they may be billed the same as an in-person visit.  Please reach out to your insurance provider with any questions.    If during the course of the call the physician/provider feels a telephone visit is not appropriate, you will not be charged for this service.\"    Patient has given verbal consent for Telephone visit?  Yes    What phone number would you like to be contacted at? [phone]    How would you like to obtain your AVS? Lazaro Grider     Romina Chavarria is a 75 year old female who presents via phone visit today for the following health issues:    [facility]       Hospital Follow-up Visit:    [facility]  Date of Admission: 12/11/2020  Date of Discharge: 12/17/2020  Reason(s) for Admission: suicidal ideation      Was your hospitalization related to COVID-19? No   Problems taking medications regularly:  None  Medication changes since discharge: None  Problems adhering to non-medication therapy:  None    Summary of hospitalization:  [facility] discharge summary reviewed  Diagnostic Tests/Treatments reviewed.  Follow up needed: PCP follow up and Psychiatry.   Other Healthcare Providers Involved in Patient s Care:         NA  Update " "since discharge: improved.   Post Discharge Medication Reconciliation: discharge medications reconciled and changed, per note/orders.  Plan of care communicated with patient          REASON FOR INPATIENT ADMISSION: [brief HPI copied from the admission documentation by Dr. Machado]  On interview, the patient reports that she is \"going through too much stuff.\" She states that she was washing clothes and had a utility knife, started cutting on herself, but could not do it. She has a hard time detailing the events leading up to her suicide attempt, how she felt that through last couple of weeks. She seems guarded about symptoms, but also as though she might not remember. She reports that she was in the hospital on 10/04, \"septic shock from stress from my .\" She notes that she came out of the hospital and everything was moved out of the apartment and he left her. She notes that this was sudden, unexpected. Chart notes suggest that there were concerns about a mass in her pancreas and she is supposed to have an ERCP and endoscopic ultrasound as an outpatient. It does not appear there has been any followup. The patient insists that a nurse came and talked to her in the hospital and told her she did not have to do that and there were no concerns or worries.     The patient reports that she had a couple beers the other day, but denies that it was for liquid courage as noted in the other notes. She insists that she has not been drinking most of this year and reports that it was her 's beer. She denies hopelessness, worthlessness or guilt. She does report feeling anxious and overwhelmed, stressed. She is reluctant to engage in another discussion of symptoms, really essentially denying any symptoms other than missing her .     The patient's daughter shares that the patient has always had characterological challenges and that the patient and the patient's daughter have not particularly been close. The " patient's daughter had been closer to her father. She notes that in recent years that she has had reduced contact with her mother and was not aware of the day to day. It sounds as though the patient's  was doing a lot of things for the patient including managing finances, cooking, cleaning, etc. The patient's  is dying of prostate cancer that I believe it has metastasized to the bone and reportedly was unable to deal with the patient's emotional outbursts, anger, forgetfulness and ultimately decided to leave. Apparently, this move had been planned for many weeks. The patient and her  were in the process of selling her home. They rented an apartment in [place] that is supposed to be assisted living, but apparently does not provide that much assistance. It sounds as though the patient's  never planned to live there very long. He is now living in a [facility] care facility in [facility]. The patient's  continues to manage the finances. The patient's daughter shares that she has discovered that her mom seems to have hoarding tendencies. They have been trying to clean out the house and it has been overflowing. She continues to buy excessively at her current apartment and reports that the patient's  has gone over there to help her put things together. She reports that her mother has long been manipulative and is not sure if it is memory or manipulation, but recently had given car keys to the patient's father, so that he could drive one of their cars [facility], but then called up the daughter, insisted that her father stole the car. She also reported the  stole her home keys which is incorrect. The patient's daughter also reports that the patient has continued to drink and frequently lies about her alcohol intake.     The patient suggests that her daughter cannot be trusted. She reports that when the patient's daughter got , that her  " never liked her and \"got me thrown in treatment.\" She sees her daughter and her  out to get her.     The patient denies thought insertion, thought deletion, ideas of reference, thought broadcasting. She denies hallucinations. She denies manic symptoms. She again does endorse anxiety and loneliness, but is generally guarded.    BRIEF HOSPITAL COURSE: 75 y.o. female admitted 12/12/2020 to [facility] for the assessment and treatment of the above presentation. The patient was admitted on an emergency hour hold.     On admission, Romina struggled to talk about her mental health symptoms or history without returning to talking about her . Externalized blame on her daughter for causing this hospitalization. Adamantly denied she was trying to kill herself, states \"It was the only way to get Monique to listen to me\". At one point did seem paranoid of Monique's , reported that he was a retired  now working for the [employer]. No other overt delusional content noted while hospitalized. She drastically downplayed and minimized suicide attempt as well as alcohol intake. Stated that \"I never tried to kill myself, no one can prove that\" and when reminded of the cuts on her wrists, \"oh that was nothing!\" and became rather irritable.     She endorsed worries about not knowing where she was, hating the hospital because it was making her depressed. She talked about her  driving her everywhere because she gets lost - however her   from her which is one of the precipitating events leading to suicide attempt. She agreed to medication changes and cognitive assessments due to concerns for forgetfulness and executive impairments, however seemed to have difficulty understanding why she was hospitalized.     Petition for MI commitment was initiated due to concern for suicide attempt prompting hospitalization, lack of insight, cognitive deficits noted and lack of support in the " community. This petition was not supported and the patient requested discharge immediately. She was agreeable to staying for one additional night to work on completing healthcare directive and POA paperwork as well as giving her time to arrange for a ride home. She was given recommendations to follow up with psychiatry and psychology and reminded that she would need to follow up for ERCP with [facility]. Education was provided on medication changes and recommendation for home health to help manage medications was emphasized. Romina was agreeable to these recommendations however has history of poor follow up.     Over the course of the hospitalization the following changes to medications were made:   Mirtazapine was started to help with mood and sleep.  Zoloft was increased to help with mood and anxiety.   Trazodone was increased to help with sleep.   Blood pressure medications adjusted to what patient reported taking at home.   The patient tolerated these changes without side effects. The patient benefited from the structured therapeutic milieu and attended programming.    It is also important to continue to monitor for cognitive changes and evaluate need for further assistance at home. She displayed some mild impairments while in the hospital and should have regular future review of further changes or decline.     At time of discharge, she is denying any thoughts of wanting to be dead or suicidal ideation. She is at elevated risk of self harm/suicide due to limited support in the community and recent attempt, however she does have capacity to understand risks of not disclosing current suicidal thoughts and requesting discharge from the hospital.     LEGAL:  Active Legal Orders   IMPLEMENT EMERGENCY HOLD       CONSULTS:   1. Medicine Consultation by [facility], Dr. Mancia was ongoing during the hospitalization, recommendations included abdominal CT scan, labs and GI consult.   2. [facility] consulted -  "recommending outpatient endoscopic ultrasonography and ERCP.   3. Neuropsych consult done by Dr. Ronquillo.      Review of Systems          Objective          Vitals:  No vitals were obtained today due to virtual visit.    healthy, alert and no distress  PSYCH: Alert and oriented times 3; coherent speech, normal   rate and volume, able to articulate logical thoughts, able   to abstract reason, no tangential thoughts, no hallucinations   or delusions  Her affect is normal  RESP: No cough, no audible wheezing, able to talk in full sentences  Remainder of exam unable to be completed due to telephone visits    No results found for this or any previous visit (from the past 24 hour(s)).        Assessment/Plan:    Assessment & Plan     Suicide ideation  - Patient denies any suicidal ideation at this time. Notes that she did not even mean it when she sent her daughter the e-mail. \"That was a fake thing. Sending that e-mail to my daughter\"  - She notes that she is on good talking terms with her  and taking her medication. I discussed with her that I am concerned about her mental health and would recommend psychiatry at this time, she was very open to this. We discussed continuing her Zoloft daily and consistency with this.   - MENTAL HEALTH REFERRAL  - Adult; Psychiatry; Psychiatry; [facility]: Collaborative Care Psychiatry Service/Bridge to Long-Term Psychiatry as indicated ([phone]); Yes; Recent hospitalization or Emergency Department Visit; Yes; We will contact you t...    Alcohol abuse  - Hospital notes recent drinking, patient notes she has not had any recent alcohol intake.  - She notes that she has not had a drink in the last couple of days.   - MENTAL HEALTH REFERRAL  - Adult; Psychiatry; Psychiatry; G: Collaborative Care Psychiatry Service/Bridge to Long-Term Psychiatry as indicated ([phone]); Yes; Recent hospitalization or Emergency Department Visit; Yes; We will contact you t...    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    Pancreatic mass  - Recommend follow up with Gastroenterology and Dr. Reese will assist her with this today.       The patient indicates understanding of these issues and agrees with the plan.    There are no Patient Instructions on file for this visit.    Return in about 3 weeks (around 1/13/2021) for Mood Check.    MELE Howard [facility]    Phone call duration: 18 minutes

## 2020-12-23 NOTE — TELEPHONE ENCOUNTER
Attempt One:   Writer tried to call the patient. Left message for her to return our call. Will try patient two more times and if I can't reach her will call emergency contact.     Babar Awad RN  December 23, 2020

## 2020-12-23 NOTE — TELEPHONE ENCOUNTER
Attempt Two:   Went right to .   Will call again in an hour.     If patient returns call please find out why today's visit was cancelled with Liz Richard ([place] provider). Also find out if she is safe and how she is doing. Liz is able to still have her visit today at 4pm or tomorrow.     Babar Awad RN  December 23, 2020

## 2021-01-01 ENCOUNTER — MYC MEDICAL ADVICE (OUTPATIENT)
Dept: FAMILY MEDICINE | Facility: OTHER | Age: 76
End: 2021-01-01

## 2021-01-01 ENCOUNTER — MYC REFILL (OUTPATIENT)
Dept: GASTROENTEROLOGY | Facility: CLINIC | Age: 76
End: 2021-01-01

## 2021-01-01 ENCOUNTER — HEALTH MAINTENANCE LETTER (OUTPATIENT)
Age: 76
End: 2021-01-01

## 2021-01-01 ENCOUNTER — MYC REFILL (OUTPATIENT)
Dept: FAMILY MEDICINE | Facility: OTHER | Age: 76
End: 2021-01-01

## 2021-01-01 ENCOUNTER — TELEPHONE (OUTPATIENT)
Dept: FAMILY MEDICINE | Facility: OTHER | Age: 76
End: 2021-01-01

## 2021-01-01 DIAGNOSIS — F41.9 ANXIETY: ICD-10-CM

## 2021-01-01 DIAGNOSIS — K92.2 GASTROINTESTINAL HEMORRHAGE, UNSPECIFIED GASTROINTESTINAL HEMORRHAGE TYPE: ICD-10-CM

## 2021-01-01 DIAGNOSIS — K70.30 ALCOHOLIC CIRRHOSIS OF LIVER WITHOUT ASCITES (H): ICD-10-CM

## 2021-01-01 DIAGNOSIS — F33.2 SEVERE EPISODE OF RECURRENT MAJOR DEPRESSIVE DISORDER, WITHOUT PSYCHOTIC FEATURES (H): Primary | ICD-10-CM

## 2021-01-01 RX ORDER — FUROSEMIDE 40 MG
40 TABLET ORAL DAILY
Qty: 90 TABLET | Refills: 0 | Status: SHIPPED | OUTPATIENT
Start: 2021-01-01

## 2021-01-01 RX ORDER — SPIRONOLACTONE 50 MG/1
50 TABLET, FILM COATED ORAL DAILY
Qty: 90 TABLET | Refills: 0 | Status: SHIPPED | OUTPATIENT
Start: 2021-01-01

## 2021-01-01 RX ORDER — FERROUS GLUCONATE 324(38)MG
324 TABLET ORAL
Qty: 90 TABLET | Refills: 2 | Status: SHIPPED | OUTPATIENT
Start: 2021-01-01

## 2021-01-01 RX ORDER — MIRTAZAPINE 7.5 MG/1
7.5 TABLET, FILM COATED ORAL AT BEDTIME
Status: CANCELLED | OUTPATIENT
Start: 2021-01-01

## 2021-01-01 RX ORDER — SERTRALINE HYDROCHLORIDE 100 MG/1
100 TABLET, FILM COATED ORAL DAILY
Qty: 90 TABLET | Refills: 2 | Status: SHIPPED | OUTPATIENT
Start: 2021-01-01

## 2021-01-01 RX ORDER — TRAZODONE HYDROCHLORIDE 50 MG/1
50 TABLET, FILM COATED ORAL AT BEDTIME
Qty: 90 TABLET | Refills: 2 | Status: SHIPPED | OUTPATIENT
Start: 2021-01-01

## 2021-01-06 NOTE — TELEPHONE ENCOUNTER
Patient is due to see gastroenterology please assist her in scheduling.       MELE Howard CNP  Questions or concerns please feel free to send me a [employer] message or call me  Phone : [phone]

## 2021-01-11 NOTE — TELEPHONE ENCOUNTER
Prescription approved per [facility] Refill Protocol.    Ivana Banda RN on 1/11/2021 at 2:23 PM

## 2021-01-14 NOTE — TELEPHONE ENCOUNTER
Spoke to patient and she requested help getting her address at [facility] updated. I called and was able to update this however they have not received the prescriptions that were sent on 01/11/21. Please resend or fax Rx orders to the [facility] pharmacy.    Patient states she has been taking 1.5 tablets of Trazodone which is helping her sleep better. She is wondering if you can change her Prescription to reflect this.     Makenzie Cohen, CMA

## 2021-01-15 NOTE — TELEPHONE ENCOUNTER
RN's please call patient. She is high risk. Please huddle with me.       MELE Howard CNP  Questions or concerns please feel free to send me a [email or web address] message or call me  Phone : [phone]

## 2021-01-15 NOTE — TELEPHONE ENCOUNTER
Patients neighbor returned call, you can call her at:     [phone]    While talking to neighbor friend, the police showed up at the home.

## 2021-01-15 NOTE — TELEPHONE ENCOUNTER
Per [facility] okay to call 911 for welfare check.   Prior to calling 911 tried calling the patient and Myrna and was unable to reach either one.     Per  was called and they were informed of the situation. Patient was placed on a 72-hour hold due to circumstances. Informed EMS dispatch that the patient has a hx of dm, depression, bi-polar, and suicidal ideation.     Babar Awad RN, BSN  [place] [facility]  January 15, 2021

## 2021-01-15 NOTE — TELEPHONE ENCOUNTER
"Writer called patient to discuss what was going on. The patient's neighbor Hilary answered the phone. Hilary stated that the patient was falling over, mentioned that she had not slept, or was very unsteady. Hilary informed the writer that the patient had a drink with alcohol earlier, but Hilary felt like it was not related to what was going on. She mentioned the patient has a history of diabetes. During conversation Hilary was helping hold the patient up to brush her teeth. Writer informed her to put the phone down to make sure she doesn't fall. Finally the patient was sat down on the toilet.   The patient was put on speaker and gave me permission to speak with Hilary, but then the patient started talking to me.     The patient was difficult to understand and was slurring her speech. I informed her that I had discussed what was going on with Liz and that Liz felt like she needed to be in the hospital or admitted to the mental health facility. I informed the patient that this would be a safe place to go and this is where she can get the help she needs. Patient kept saying \" okay\" and \" yes, I understand but I am fine here.\"     The phone was given back to Myrna. Who I informed that I needed to touch base with Liz.     PLAN:   Huddled with Liz.     Babar Awad RN, BSN  [place]  January 15, 2021            "

## 2021-01-15 NOTE — TELEPHONE ENCOUNTER
"Returned call to pt and advised recommendations. Pt's neighbor was there with pt at the time of the call. She is very concerned about Romina. She states pt is unable to sleep and very unsteady on her feet. She fell trying to get to the door. She was served with divorce papers and is very upset. I asked if she felt pt needed ER evaluation, Taylor stated pt was against this and then pt stated \"no absolutely not\" and she ended the call  "

## 2021-01-15 NOTE — TELEPHONE ENCOUNTER
It looks like based on hospital records they recommend she take both.  I was not aware she did not continue the remeron or have it, my apologies. The Remeron is for mood stability and sleep, she did well with this during hospitalization.     Since she has not been taking the Remeron she can continue with the Trazodone and we will await psychiatry recommendations.     MELE Howard CNP  Questions or concerns please feel free to send me a [employer] message or call me  Phone : [phone]

## 2021-01-15 NOTE — TELEPHONE ENCOUNTER
Hilary did call back to the clinic and was transferred to the writer. She mentioned that the police were there and the patient was agreeing to go with them to the hospital.   Patient also admitted to drinking last night as well.     Will follow-up on Monday (1/18/21).     Babar Awad RN, BSN  [place] [facility]  January 15, 2021

## 2021-01-19 NOTE — TELEPHONE ENCOUNTER
LM for the patient to return call to the clinic.   Please transfer to any triage RN.   Responded to [employer] message.   Babar Awad RN  October 21, 2020

## 2021-01-20 NOTE — TELEPHONE ENCOUNTER
Tried calling. Unable to reach patient. Left message for patient to return call.   Will close encounter.     Babar Awad RN, BSN  [place] [facility]  January 20, 2021

## 2021-10-02 NOTE — MR AVS SNAPSHOT
After Visit Summary   10/26/2017    Romina Chavarria    MRN: [id number]           Patient Information     Date Of Birth          1945        Visit Information        Provider Department      10/26/2017 11:50 AM Liz Richard APRN CNP [facility]        Today's Diagnoses     Rib pain on left side    -  1    History of recent fall        At risk for falling        Alcohol dependence in remission (H)        Need for prophylactic vaccination and inoculation against influenza          Care Instructions    - Await results of of xray, likely contusion or mild fracture of the rib area. This can take awhile to heal. Recommend ice 3-4 times daily, ibuprofen sparingly along with TYLENOL 1000 mg every 6 hours; maximum daily dose: 3000 mg daily.   -Recommend holding a pillow with deep breathing and coughing  - If you develop shortness of breath, increased pain or worsening symptoms please return to be evaluated.   - If you change your mind and would like to do some physical therapy and home safety assessment please let me know.     MELE Howard CNP        Rib Contusion or Minor Fracture    A rib contusion is a bruise to one or more rib bones. It may cause pain, tenderness, swelling, and a purplish tint to the skin. There may be a sharp pain with each breath. A rib contusion takes anywhere from a few days to a few weeks to heal. A minor rib fracture or break may cause the same symptoms as a rib contusion. The small crack may not be seen on a regular chest X-ray. Treatment for both problems is the same.  Home care    You may use over-the-counter pain medicine to control pain, unless another pain medicine was prescribed. If you have chronic liver or kidney disease or ever had a stomach ulcer or GI bleeding, talk with your healthcare provider before using these medicines.    Rest. Do not lift anything heavy or do any activity that causes pain.    Apply an ice pack over the injured area for  BMP wnl. A1c 4.9%. Lipid panel with elevated LDL at 108.8. ASCVD risk is low at 0.8% - no statin indicated at this time. Will recommend lifestyle interventions. 15 to 20 minutes every 1 to 2 hours. You should do this for the first 24 to 48 hours. You can make an ice pack by filling a plastic bag that seals at the top with ice cubes and then wrapping it with a thin towel. Continue with ice packs as needed for the relief of pain and swelling.    The first 3 to 4 weeks of healing will be the most painful. If your pain is not under control with the treatment given, call your healthcare provider. Sometimes a stronger pain medicine may be needed. A nerve block can be done in case of severe pain. It will numb the nerve between the ribs.  Follow-up care  Follow up with your healthcare provider, or as advised.  If X-rays were taken, you will be told of any new findings that may affect your care.  Call 911  Call 911 if you have:    Dizziness, weakness or fainting    Shortness of breath with or without chest discomfort    New or worsening pain  When to seek medical advice  Call your healthcare provider right away if any of these occur:    Fever of 100.4 F (38 C) or above lasting for 24 to 48 hours    Stomach pain  Date Last Reviewed: 12/2/2015 2000-2017 [employer]. [address] [place] [address]. All rights reserved. This information is not intended as a substitute for professional medical care. Always follow your healthcare professional's instructions.                Follow-ups after your visit        Follow-up notes from your care team     Return if symptoms worsen or fail to improve.      Your next 10 appointments already scheduled     Nov 27, 2017  2:30 PM CST   (Arrive by 2:15 PM)   Return General Liver with José Antonio Reese MD   [facility] ([facility])    [address] [place] [id number] [phone]              Future tests that were ordered for you today     Open Future Orders        Priority Expected Expires Ordered    XR Ribs Left 2 Views Routine 10/26/2017 10/26/2018 10/26/2017           "  Who to contact     If you have questions or need follow up information about today's clinic visit or your schedule please contact [facility] directly at [phone].  Normal or non-critical lab and imaging results will be communicated to you by MyChart, letter or phone within 4 business days after the clinic has received the results. If you do not hear from us within 7 days, please contact the clinic through MyChart or phone. If you have a critical or abnormal lab result, we will notify you by phone as soon as possible.  Submit refill requests through [facility] or call your pharmacy and they will forward the refill request to us. Please allow 3 business days for your refill to be completed.          Additional Information About Your Visit        [employer] Information     [facility] gives you secure access to your electronic health record. If you see a primary care provider, you can also send messages to your care team and make appointments. If you have questions, please call your primary care clinic.  If you do not have a primary care provider, please call [phone] and they will assist you.        Care EveryWhere ID     This is your Care EveryWhere ID. This could be used by other organizations to access your [facility] medical records  [id number]        Your Vitals Were     Pulse Temperature Respirations Height Pulse Oximetry       68 98.3  F (36.8  C) (Oral) 16 4' 10\" (1.473 m) 98%        Blood Pressure from Last 3 Encounters:   10/26/17 126/78   05/23/17 147/85   08/09/16 121/75    Weight from Last 3 Encounters:   05/23/17 177 lb (80.3 kg)   08/09/16 187 lb (84.8 kg)   01/21/16 177 lb 12.8 oz (80.6 kg)              We Performed the Following     EKG 12-lead complete w/read - Clinics     FLU VACCINE, INCREASED ANTIGEN, PRESV FREE, AGE 65+ [49015]     Vaccine Administration, Initial [74707]        Primary Care Provider Office Phone # Fax #    José Antonio Reese [phone]       [facility] [address] [facility] [address]  [facility] [address]        Equal Access to Services     RASHEEDTIMA MARTINEZ : Hadii veronica vila camillesun Soelizabeth, waradhada luqadaha, qaybta kavicentebharat story, waxay idiin hayfloromayra alvaradoeugeniaorquidea corona. So [facility] [phone].    ATENCIÓN: Si habla español, tiene a quintanilla disposición servicios gratuitos de asistencia lingüística. Llame al [phone].    We comply with applicable federal civil rights laws and [place] laws. We do not discriminate on the basis of race, color, national origin, age, disability, sex, sexual orientation, or gender identity.            Thank you!     Thank you for choosing [facility]  for your care. Our goal is always to provide you with excellent care. Hearing back from our patients is one way we can continue to improve our services. Please take a few minutes to complete the written survey that you may receive in the mail after your visit with us. Thank you!             Your Updated Medication List - Protect others around you: Learn how to safely use, store and throw away your medicines at www.disposemymeds.org.          This list is accurate as of: 10/26/17 12:49 PM.  Always use your most recent med list.                   Brand Name Dispense Instructions for use Diagnosis    ferrous gluconate 324 (38 FE) MG tablet    FERGON    100 tablet    Take 1 tablet (324 mg) by mouth 3 times daily (with meals)    Gastrointestinal hemorrhage, unspecified gastrointestinal hemorrhage type       FISH OIL TRIPLE STRENGTH 1400 MG Caps      Take 1 capsule by mouth once daily. Indications: CARDIAC DISEASE        furosemide 40 MG tablet    LASIX    180 tablet    TAKE 1 TABLET TWICE DAILY    Alcoholic cirrhosis of liver without ascites (H)       GREEN COFFEE BEAN PO      Take  by mouth.        loratadine 10 MG tablet    CLARITIN     Take 1 tablet by mouth daily.        MULTI-DAY Tabs      Take 1 tablet by mouth once daily. Indications: VITAMIN DEFICIENCY PREVENTION         potassium chloride SA 20 MEQ CR tablet    KLOR-CON    180 tablet    Take 1 tablet (20 mEq) by mouth 2 times daily    Hypokalemia       sertraline 100 MG tablet    ZOLOFT    90 tablet    Take 1 tablet (100 mg) by mouth daily    Anxiety       spironolactone 50 MG tablet    ALDACTONE    180 tablet    TAKE 1 TABLET TWICE DAILY    Alcoholic cirrhosis of liver without ascites (H)       traZODone 50 MG tablet    DESYREL    90 tablet    Take 1 tablet (50 mg) by mouth At Bedtime    Anxiety       triamcinolone 0.1 % cream    KENALOG    60 g    Apply topically 2 times daily    Venous stasis dermatitis, unspecified laterality       UNABLE TO FIND      MEDICATION NAME: Liver Dane - [employer] Milk Thistle, Artichoke, Tumeric